# Patient Record
Sex: MALE | Race: WHITE | NOT HISPANIC OR LATINO | Employment: OTHER | URBAN - METROPOLITAN AREA
[De-identification: names, ages, dates, MRNs, and addresses within clinical notes are randomized per-mention and may not be internally consistent; named-entity substitution may affect disease eponyms.]

---

## 2019-04-08 ENCOUNTER — HISTORICAL (OUTPATIENT)
Dept: WOUND CARE | Facility: HOSPITAL | Age: 74
End: 2019-04-08

## 2022-05-05 NOTE — HISTORICAL [facility]ER
This is a historical note converted from Jasbir. Formatting and pictures may have been removed.  Please reference Jasbir for original formatting and attached multimedia. Chief Complaint  right BKA/stump wound  History of Present Illness  72 yo male presents for a right BKA/stump wound. Patient is currently being treated with hydrofera to wound daily with a ABD pad, kerix and tape.? He has been treated in the past at [facility] in? 2016.? 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 Today he presents with a gangrenous B/K amputation stump.  Review of Systems  Constitutional:?no fever, fatigue, weakness  ENMT:?no?nasal congestion/drainage  Respiratory:?no couch, no shortness of breath  Cardiovascular:?no chest pain, no palpitations, no edema  Gastrointestinal:?no nausea, vomiting  Hema/Lymph:?no abnormal bruising or bleeding  Musculoskeletal:?no muscle or joint pain, no joint swelling  Integumentary:?right BKA/ stump  ?  ?  Physical Exam  Vitals & Measurements  HR:?83(Peripheral)? RR:?20? BP:?133/66?  HT:?160?cm? WT:?78?kg?  Incision/Wounds  ?1. Leg Right Other: BKA/stump Surgical incision?- last charted: 04/08/2019 15:00  ?? ??Assessment Done By?- Wound Care Team  ?? ??Abnormality Color?- Yellow  ?? ??Pressure Point?- Bony prominence  ?? ??Dressing Type?- ABD dressing pad  ?? ??Dressing Assessment?- Drainage present  ?? ??Dressing Activity?- Changed  ?? ??Length?- 11.5 cm  ?? ??Width?- 22.0 cm  ?? ??Depth?- 0 cm  ?? ??Percent Necrotic Tissue Slough?- 100 %  ?? ??Exudate Amount?- Heavy  ?? ??Exudate Type?- Serosanguineous  ?? ??Exudate Odor?- None  ?? ??Edge?- Attached to wound bed  ?  ?  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 Montgomery for further  evaluation of wound and is scheduled to see him tomorrow morning at 8am. A dry dressings was applied to wound. Patient is to RTC after evaluation by Dr. Montgomery.   Problem List/Past Medical History  Ongoing  HTN (hypertension)  PAD (peripheral artery disease)  Tobacco user  Historical  No qualifying data  Procedure/Surgical History  Angiography, extremity, unilateral, radiological supervision and interpretation (09/29/2016)  Dilation of Left Femoral Artery with Intraluminal Device, Percutaneous Approach (09/29/2016)  Dilation of Left Popliteal Artery with Intraluminal Device, Percutaneous Approach (09/29/2016)  Extirpation of Matter from Left Femoral Artery, Percutaneous Approach (09/29/2016)  Extirpation of Matter from Left Popliteal Artery, Percutaneous Approach (09/29/2016)  Fluoroscopy of Left Lower Extremity Arteries using Low Osmolar Contrast (09/29/2016)  Revascularization, endovascular, open or percutaneous, femoral, popliteal artery(s), unilateral; with transluminal stent placement(s) and atherectomy, includes angioplasty within the same vessel, when performed (09/29/2016)  back surgery (2016)  peripheral stents   Medications  aspirin 81 mg oral tablet, 81 mg= 1 tab(s), Oral, Daily  benazepril 10 mg oral tablet, 10 mg= 1 tab(s), Oral, Daily  diltiazem 120 mg/24 hours oral CAPsule extended release, 120 mg= 1 cap(s), Oral, Daily  Diltiazem Hydrochloride ER, 420 mg, Daily,? ?Not taking: Currently taking 120mg qd  Lyrica 50 mg oral capsule, 50 mg= 1 cap(s), Oral, TID  Norco 7.5 mg-325 mg oral tablet, 1 tab(s), Oral, q4hr, PRN  Plavix 75 mg oral tablet, 75 mg= 1 tab(s), Oral, Daily  tamsulosin 0.4 mg oral capsule, 0.4 mg= 1 cap(s), Oral, Daily  terazosin 10 mg oral capsule, 10 mg= 1 cap(s), Oral, Once a day (at bedtime),? ?Not taking: Currently taking 10mg qd  terazosin 5 mg oral capsule, 5 mg= 1 cap(s), Oral, qPM  Zontivity 2.08 mg oral tablet, 2.08 mg= 1 tab(s), Oral, Daily, 5 refills,? ?Not taking  ZyrTEC,  Oral, Daily  Allergies  No Known Medication Allergies  Social History  Alcohol  Current, Liquor, 3-5 times per week, 09/27/2016  Employment/School  Part time, 09/27/2016  Home/Environment  Lives with Spouse., 09/27/2016  Nutrition/Health  Regular, 09/27/2016  Tobacco  Former smoker, quit more than 30 days ago, N/A, 04/08/2019  Current every day smoker, Cigarettes, 09/27/2016  Family History  Family history is negative  Health Maintenance  Health Maintenance  ???Pending?(in the next year)  ??? ??OverDue  ??? ? ? ?Advance Directive due??09/27/17??and every 1??year(s)  ??? ? ? ?Fall Risk Assessment due??09/30/17??and every 1??year(s)  ??? ? ? ?Aspirin Therapy for CVD Prevention due??09/30/17??and every 1??year(s)  ??? ??Due?  ??? ? ? ?ADL Screening due??04/08/19??and every 1??year(s)  ??? ? ? ?Abdominal Aortic Aneurysm Screening due??04/08/19??and every 100??year(s)  ??? ? ? ?Alcohol Misuse Screening due??04/08/19??and every 1??year(s)  ??? ? ? ?Cognitive Screening due??04/08/19??and every 1??year(s)  ??? ? ? ?Functional Assessment due??04/08/19??and every 1??year(s)  ??? ? ? ?Geriatric Depression Screening due??04/08/19??and every 1??year(s)  ??? ? ? ?Lung Cancer Screening due??04/08/19??and every 1??year(s)  ??? ? ? ?Obesity Screening due??04/08/19??and every 1??year(s)  ??? ? ? ?Pneumococcal Vaccine due??04/08/19??Variable frequency  ??? ? ? ?Smoking Cessation due??04/08/19??Variable frequency  ??? ? ? ?Tetanus Vaccine due??04/08/19??and every 10??year(s)  ??? ? ? ?Zoster Vaccine due??04/08/19??and every 100??year(s)  ???Satisfied?(in the past 1 year)  ??? ??Satisfied?  ??? ? ? ?Blood Pressure Screening on??04/08/19.??Satisfied by Gretchen Randle  ??? ? ? ?Diabetes Screening on??12/12/18.  ?  ?

## 2023-11-04 ENCOUNTER — HOSPITAL ENCOUNTER (EMERGENCY)
Facility: HOSPITAL | Age: 78
Discharge: HOME | End: 2023-11-04
Attending: EMERGENCY MEDICINE
Payer: MEDICARE

## 2023-11-04 VITALS
WEIGHT: 190 LBS | BODY MASS INDEX: 27.2 KG/M2 | HEIGHT: 70 IN | SYSTOLIC BLOOD PRESSURE: 135 MMHG | RESPIRATION RATE: 18 BRPM | DIASTOLIC BLOOD PRESSURE: 71 MMHG | HEART RATE: 78 BPM | OXYGEN SATURATION: 97 % | TEMPERATURE: 98 F

## 2023-11-04 DIAGNOSIS — S41.111A LACERATION OF RIGHT UPPER EXTREMITY, INITIAL ENCOUNTER: ICD-10-CM

## 2023-11-04 DIAGNOSIS — S02.401A CLOSED FRACTURE OF MAXILLARY SINUS, INITIAL ENCOUNTER: ICD-10-CM

## 2023-11-04 DIAGNOSIS — Z79.01 CHRONIC ANTICOAGULATION: ICD-10-CM
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
## 2023-11-04 LAB
ABORH RETYPE: NORMAL
ALBUMIN SERPL-MCNC: 3.7 G/DL (ref 3.4–4.8)
ALBUMIN/GLOB SERPL: 1.3 RATIO (ref 1.1–2)
ALP SERPL-CCNC: 70 UNIT/L (ref 40–150)
ALT SERPL-CCNC: 15 UNIT/L (ref 0–55)
APTT PPP: 50.5 SECONDS (ref 23.2–33.7)
AST SERPL-CCNC: 15 UNIT/L (ref 5–34)
BASOPHILS # BLD AUTO: 0.06 X10(3)/MCL
BASOPHILS NFR BLD AUTO: 0.7 %
BILIRUB SERPL-MCNC: 0.6 MG/DL
BUN SERPL-MCNC: 13.7 MG/DL (ref 8.4–25.7)
CALCIUM SERPL-MCNC: 9.3 MG/DL (ref 8.8–10)
CHLORIDE SERPL-SCNC: 113 MMOL/L (ref 98–107)
CO2 SERPL-SCNC: 21 MMOL/L (ref 23–31)
CREAT SERPL-MCNC: 0.82 MG/DL (ref 0.73–1.18)
EOSINOPHIL # BLD AUTO: 0.27 X10(3)/MCL (ref 0–0.9)
EOSINOPHIL NFR BLD AUTO: 3.1 %
ERYTHROCYTE [DISTWIDTH] IN BLOOD BY AUTOMATED COUNT: 13.4 % (ref 11.5–17)
GFR SERPLBLD CREATININE-BSD FMLA CKD-EPI: >60 MLS/MIN/1.73/M2
GLOBULIN SER-MCNC: 2.8 GM/DL (ref 2.4–3.5)
GLUCOSE SERPL-MCNC: 116 MG/DL (ref 82–115)
GROUP & RH: NORMAL
HCT VFR BLD AUTO: 41.1 % (ref 42–52)
HGB BLD-MCNC: 14 G/DL (ref 14–18)
IMM GRANULOCYTES # BLD AUTO: 0.03 X10(3)/MCL (ref 0–0.04)
IMM GRANULOCYTES NFR BLD AUTO: 0.3 %
INDIRECT COOMBS GEL: NORMAL
INR PPP: 2.3
LYMPHOCYTES # BLD AUTO: 1.5 X10(3)/MCL (ref 0.6–4.6)
LYMPHOCYTES NFR BLD AUTO: 17.2 %
MCH RBC QN AUTO: 32.5 PG (ref 27–31)
MCHC RBC AUTO-ENTMCNC: 34.1 G/DL (ref 33–36)
MCV RBC AUTO: 95.4 FL (ref 80–94)
MONOCYTES # BLD AUTO: 0.57 X10(3)/MCL (ref 0.1–1.3)
MONOCYTES NFR BLD AUTO: 6.5 %
NEUTROPHILS # BLD AUTO: 6.29 X10(3)/MCL (ref 2.1–9.2)
NEUTROPHILS NFR BLD AUTO: 72.2 %
NRBC BLD AUTO-RTO: 0 %
PLATELET # BLD AUTO: 162 X10(3)/MCL (ref 130–400)
PMV BLD AUTO: 10.4 FL (ref 7.4–10.4)
POTASSIUM SERPL-SCNC: 3.8 MMOL/L (ref 3.5–5.1)
PROT SERPL-MCNC: 6.5 GM/DL (ref 5.8–7.6)
PROTHROMBIN TIME: 24.9 SECONDS (ref 12.5–14.5)
RBC # BLD AUTO: 4.31 X10(6)/MCL (ref 4.7–6.1)
SODIUM SERPL-SCNC: 146 MMOL/L (ref 136–145)
SPECIMEN OUTDATE: NORMAL
WBC # SPEC AUTO: 8.72 X10(3)/MCL (ref 4.5–11.5)

## 2023-11-04 PROCEDURE — 63600175 PHARM REV CODE 636 W HCPCS: Performed by: EMERGENCY MEDICINE

## 2023-11-04 PROCEDURE — 80053 COMPREHEN METABOLIC PANEL: CPT | Performed by: EMERGENCY MEDICINE

## 2023-11-04 PROCEDURE — 25000003 PHARM REV CODE 250: Performed by: EMERGENCY MEDICINE

## 2023-11-04 PROCEDURE — 85025 COMPLETE CBC W/AUTO DIFF WBC: CPT | Performed by: EMERGENCY MEDICINE

## 2023-11-04 PROCEDURE — 90471 IMMUNIZATION ADMIN: CPT | Performed by: EMERGENCY MEDICINE

## 2023-11-04 PROCEDURE — 85610 PROTHROMBIN TIME: CPT | Performed by: EMERGENCY MEDICINE

## 2023-11-04 PROCEDURE — 85730 THROMBOPLASTIN TIME PARTIAL: CPT | Performed by: EMERGENCY MEDICINE

## 2023-11-04 PROCEDURE — 12004 RPR S/N/AX/GEN/TRK7.6-12.5CM: CPT

## 2023-11-04 PROCEDURE — 86901 BLOOD TYPING SEROLOGIC RH(D): CPT | Performed by: EMERGENCY MEDICINE

## 2023-11-04 PROCEDURE — 99291 CRITICAL CARE FIRST HOUR: CPT

## 2023-11-04 PROCEDURE — G0390 TRAUMA RESPONS W/HOSP CRITI: HCPCS

## 2023-11-04 PROCEDURE — 90715 TDAP VACCINE 7 YRS/> IM: CPT | Performed by: EMERGENCY MEDICINE

## 2023-11-04 RX ORDER — CEFADROXIL 500 MG/1
500 CAPSULE ORAL EVERY 12 HOURS
Status: DISCONTINUED | OUTPATIENT
Start: 2023-11-04 | End: 2023-11-04

## 2023-11-04 RX ORDER — CEFADROXIL 500 MG/1
500 CAPSULE ORAL EVERY 12 HOURS
Qty: 14 CAPSULE | Refills: 0 | Status: SHIPPED | OUTPATIENT
Start: 2023-11-04 | End: 2023-11-11

## 2023-11-04 RX ORDER — LIDOCAINE HYDROCHLORIDE 10 MG/ML
INJECTION INFILTRATION; PERINEURAL
Status: DISCONTINUED
Start: 2023-11-04 | End: 2023-11-04 | Stop reason: HOSPADM

## 2023-11-04 RX ORDER — CEFADROXIL 500 MG/1
500 CAPSULE ORAL EVERY 12 HOURS
Status: COMPLETED | OUTPATIENT
Start: 2023-11-04 | End: 2023-11-04

## 2023-11-04 RX ADMIN — CEFADROXIL 500 MG: 500 CAPSULE ORAL at 07:11

## 2023-11-04 RX ADMIN — TETANUS TOXOID, REDUCED DIPHTHERIA TOXOID AND ACELLULAR PERTUSSIS VACCINE, ADSORBED 0.5 ML: 5; 2.5; 8; 8; 2.5 SUSPENSION INTRAMUSCULAR at 04:11

## 2023-11-04 NOTE — ED PROVIDER NOTES
"Encounter Date: 11/4/2023    SCRIBE #1 NOTE: I, Darline Pilar, am scribing for, and in the presence of,  Delores Garrison MD. I have scribed the following portions of the note - Other sections scribed: HPI, ROS, PE.       History   No chief complaint on file.    78 year old white male was in a store with his girlfriend when he suddenly had the urge to have BM. He rushed to the bathroom and began to have loose stools which soiled his pants.  He was taking off his pants and his prosthetic leg because both were soiled and he was trying to clean it up.  He went to reattach his prosthetic leg but says he did it too fast and did not get a good seal and he ended up falling while in the bathroom stall hitting his face and right elbow on the toilet.  His grandson happened to be in the bathroom and was able to get help.  Patient denies any LOC. he complained of neck pain to EMS but refused a cervical collar.  He sustained laceration to his right arm.  He says he does not feel like anything is broken.  No chest pain or shortness of breath.  No hip pain.  No trouble moving arms or legs .  He is on chronic anticoagulation  : Xarelto and Plavix.  He says per date Dr. Jacob Parsons but is not exactly sure why.  He says for my "blood flow".  He was made a level 2 trauma      The history is provided by the patient and the EMS personnel.     Review of patient's allergies indicates:  No Known Allergies  No past medical history on file.  No past surgical history on file.  No family history on file.     Review of Systems   Constitutional: Negative.    HENT: Negative.     Eyes: Negative.    Respiratory: Negative.     Cardiovascular: Negative.    Gastrointestinal: Negative.  Negative for vomiting.   Endocrine: Negative.    Genitourinary: Negative.    Musculoskeletal: Negative.  Negative for neck pain.        No rib pain   Skin:  Positive for wound (laceration to right forearm).   Allergic/Immunologic: Negative.    Neurological: " Negative.    Hematological: Negative.    Psychiatric/Behavioral: Negative.     All other systems reviewed and are negative.      Physical Exam     Initial Vitals   BP Pulse Resp Temp SpO2   11/04/23 1559 11/04/23 1559 11/04/23 1559 11/04/23 1601 11/04/23 1559   137/76 72 (!) 28 97.9 °F (36.6 °C) (!) 92 %      MAP       --                Physical Exam    Nursing note and vitals reviewed.  Constitutional: He appears well-developed and well-nourished. He does not appear ill. No distress.   Elderly white male in no distress   HENT:   Head: Normocephalic and atraumatic.   Right Ear: Tympanic membrane normal.   Left Ear: Tympanic membrane normal.   Mouth/Throat: No oropharyngeal exudate or posterior oropharyngeal erythema.   Right cheek is contused with swelling.  Hearing aids in both ears.  Old blood in right nostril.   Eyes: Conjunctivae and EOM are normal. Pupils are equal, round, and reactive to light.   Neck: Neck supple. No tracheal tenderness present. No tracheal deviation present. Carotid bruit is not present.   It is nontender to my examination   Cardiovascular:  Normal rate and regular rhythm.           Pulmonary/Chest: Breath sounds normal. No stridor. No respiratory distress.   No rib tenderness or crepitus   Abdominal: Abdomen is soft. Bowel sounds are normal. There is no abdominal tenderness.   Old bruising to left lower abdomen   Musculoskeletal:         General: Normal range of motion.        Arms:       Cervical back: Neck supple.      Lumbar back: No tenderness.      Comments: Chronic right BKA     Neurological: He is alert and oriented to person, place, and time. He has normal strength. No cranial nerve deficit or sensory deficit. GCS score is 15. GCS eye subscore is 4. GCS verbal subscore is 5. GCS motor subscore is 6.   No motor or sensory deficits found   Skin: Skin is warm, dry and intact. Capillary refill takes less than 2 seconds. No cyanosis.   Both his upper arms demonstrate old bruising    Psychiatric: He has a normal mood and affect.         ED Course   Lac Repair    Date/Time: 11/4/2023 6:51 PM    Performed by: Delores Garrison MD  Authorized by: Delores Garrison MD    Consent:     Consent obtained:  Verbal    Consent given by:  Patient    Risks, benefits, and alternatives were discussed: yes      Risks discussed:  Infection, pain, retained foreign body, poor cosmetic result, tendon damage, poor wound healing, nerve damage and need for additional repair    Alternatives discussed:  No treatment  Anesthesia:     Anesthesia method:  Local infiltration    Local anesthetic:  Lidocaine 1% WITH epi  Laceration details:     Location:  Shoulder/arm    Shoulder/arm location:  R lower arm    Length (cm):  12    Depth (mm):  0.3  Pre-procedure details:     Preparation:  Patient was prepped and draped in usual sterile fashion and imaging obtained to evaluate for foreign bodies  Exploration:     Imaging outcome: foreign body not noted      Contaminated: no    Treatment:     Amount of cleaning:  Standard    Irrigation solution:  Sterile saline    Irrigation volume:  250    Irrigation method:  Pressure wash    Visualized foreign bodies/material removed: no      Debridement:  None    Undermining:  None    Scar revision: no    Skin repair:     Repair method:  Sutures    Suture size:  3-0    Suture material:  Nylon    Suture technique:  Simple interrupted    Number of sutures:  15  Approximation:     Approximation:  Close  Repair type:     Repair type:  Simple  Post-procedure details:     Dressing:  Bulky dressing    Procedure completion:  Tolerated  Comments:      Patient tolerated very well.  No active bleeding  Critical Care    Date/Time: 11/4/2023 8:22 PM    Performed by: Delores Garrison MD  Authorized by: Delores Garrison MD  Direct patient critical care time: 15 minutes  Additional history critical care time: 8 minutes  Ordering / reviewing critical care time: 12 minutes  Documentation  critical care time: 15 minutes  Consult with family critical care time: 10 minutes  Total critical care time (exclusive of procedural time) : 60 minutes        Labs Reviewed   COMPREHENSIVE METABOLIC PANEL - Abnormal; Notable for the following components:       Result Value    Sodium Level 146 (*)     Chloride 113 (*)     Carbon Dioxide 21 (*)     Glucose Level 116 (*)     All other components within normal limits   PROTIME-INR - Abnormal; Notable for the following components:    PT 24.9 (*)     INR 2.3 (*)     All other components within normal limits   APTT - Abnormal; Notable for the following components:    PTT 50.5 (*)     All other components within normal limits   CBC WITH DIFFERENTIAL - Abnormal; Notable for the following components:    RBC 4.31 (*)     Hct 41.1 (*)     MCV 95.4 (*)     MCH 32.5 (*)     All other components within normal limits   CBC W/ AUTO DIFFERENTIAL    Narrative:     The following orders were created for panel order CBC auto differential.  Procedure                               Abnormality         Status                     ---------                               -----------         ------                     CBC with Differential[[id number]]       Abnormal            Final result                 Please view results for these tests on the individual orders.   URINALYSIS, REFLEX TO URINE CULTURE   TYPE & SCREEN   ABORH RETYPE          Imaging Results              CT Cervical Spine Without Contrast (Final result)  Result time 11/04/23 16:36:38      Final result by Maritza Laguerre MD (11/04/23 16:36:38)                   Impression:      1. No appreciable acute fracture  2. Moderate to severe degenerative change at the cervical spine with degenerative listhesis      Electronically signed by: Maritza Laguerre  Date:    11/04/2023  Time:    16:36               Narrative:    EXAMINATION:  CT CERVICAL SPINE WITHOUT CONTRAST    CLINICAL HISTORY:  Trauma;    TECHNIQUE:  Low dose helical  acquired images with axial, sagittal and coronal reformations though the cervical spine.  Contrast was not administered.    All CT scans at this location are performed using dose optimization techniques as appropriate to a performed exam including the following automated exposure control, adjustment of the mA and/or kV according to patient size and/or use of iterative reconstruction technique    DLP: 2088 mGycm    COMPARISON:  No relevant prior available for comparison.    FINDINGS:  Mild motion degraded exam.    BONES: No fracture.  Grade 1 anterolisthesis of C3 on C4 and C4 on C5.  Grade 1 retrolisthesis of C5 on C6. The dens is intact, the lateral masses of C1 are normally aligned, and the atlantodental interval is normal.    DISCS AND FACETS: Multilevel moderate to severe disc space narrowing most pronounced at C5-C6 and C6-C7.  Multilevel bilateral facet arthropathy.    SPINAL CANAL AND NEURAL FORAMINA: Facet arthropathy contributes to mild multilevel neural foraminal stenosis.  No significant osseous central canal stenosis appreciable.    SOFT TISSUES: Regional soft tissues are unremarkable.    LUNG APICES: Clear                                       CT Head Without Contrast (Final result)  Result time 11/04/23 16:34:21      Final result by Joseph Escalera MD (11/04/23 16:34:21)                   Impression:      No acute intracranial abnormality identified.  Findings of microvascular ischemic disease.      Electronically signed by: Joseph Escalera  Date:    11/04/2023  Time:    16:34               Narrative:    EXAMINATION:  CT HEAD WITHOUT CONTRAST    CLINICAL HISTORY:  Trauma;    TECHNIQUE:  Low dose axial images were obtained through the head.  Coronal and sagittal reformations were also performed. Contrast was not administered.    Automatic exposure control was utilized to reduce the patient's radiation dose.    DLP= 2088    COMPARISON:  None.    FINDINGS:  No acute intracranial hemorrhage, edema or  mass. No acute parenchymal abnormality.    Mild cerebral atrophy with concordant ventricular enlargement.    Scattered hypodensities throughout the deep periventricular white matter.    The osseous structures are normal.    The mastoid air cells are clear.    The auditory canals are patent bilaterally.    The globes and orbital contents are normal bilaterally.    The visualized maxillary, ethmoid and sphenoid sinuses are clear.                                       CT Maxillofacial Without Contrast (Final result)  Result time 11/04/23 16:36:22      Final result by Joseph Escalera MD (11/04/23 16:36:22)                   Impression:      Minimally displaced fracture of the anterior right maxillary sinus wall with trace subcutaneous gas and edema.      Electronically signed by: Joseph Escalera  Date:    11/04/2023  Time:    16:36               Narrative:    CLINICAL HISTORY:  Trauma.    TECHNIQUE:  Maxillofacial CT was performed without  contrast. There are sagittal and coronal reconstructed images available for review.    Automatic exposure control was utilized to reduce the patient's radiation dose.    DLP = 2088    COMPARISON:  No prior imaging available for comparison.    FINDINGS:  Minimally displaced fracture of the anterior right maxillary sinus wall with trace subcutaneous gas and edema.  The bilateral zygomatic arches are intact.  The mandible appears intact.  The nasal bones appear intact.  The bilateral orbits are intact.  There is no retro bulbar hematoma.                                       X-Ray Elbow Complete 3 views Right (Final result)  Result time 11/04/23 16:39:46      Final result by Joseph Escalera MD (11/04/23 16:39:46)                   Impression:      As above.      Electronically signed by: Joseph Escalera  Date:    11/04/2023  Time:    16:39               Narrative:    EXAMINATION:  XR ELBOW COMPLETE 3 VIEW RIGHT    CLINICAL HISTORY:  fall;.    TECHNIQUE:  AP, lateral, and oblique  views of the right elbow were performed.    COMPARISON:  None    FINDINGS:  No displaced fracture.  Degenerative changes with electro non osteophytes.                                       X-Ray Pelvis Routine AP (Final result)  Result time 11/04/23 16:38:37      Final result by Joseph Escalera MD (11/04/23 16:38:37)                   Impression:      As above.  If continued pain recommend repeat imaging within 2 weeks.      Electronically signed by: Joseph Escalera  Date:    11/04/2023  Time:    16:38               Narrative:    EXAMINATION:  XR PELVIS ROUTINE AP    CLINICAL HISTORY:  r/o bleeding or hemorrhage;    TECHNIQUE:  Single view of the pelvis.    COMPARISON:  No prior imaging available for comparison    FINDINGS:  Vascular atherosclerotic disease is noted with stents.  No displaced fracture appreciated.  Evaluation is limited secondary to large overlying pannus.                                       X-Ray Chest 1 View (Final result)  Result time 11/04/23 16:38:04      Final result by Joseph Escalera MD (11/04/23 16:38:04)                   Impression:      No acute cardiopulmonary process.      Electronically signed by: Joseph Escalera  Date:    11/04/2023  Time:    16:38               Narrative:    EXAMINATION:  XR CHEST 1 VIEW    CLINICAL HISTORY:  r/o bleeding or hemorrhage;    TECHNIQUE:  Single view of the chest    COMPARISON:  No prior imaging available for comparison.    FINDINGS:  No focal opacification, pleural effusion, or pneumothorax.    Cardiomediastinal silhouette is prominent.    No acute osseous abnormality.                                       Medications   tetanus-diphther toxoids vaccine (PF) (TENIVAC) injection (0.5 mLs Intramuscular Not Given 11/4/23 1715)   LIDOcaine HCL 10 mg/ml (1%) 10 mg/mL (1 %) injection (has no administration in time range)   Tdap (BOOSTRIX) vaccine injection 0.5 mL (0.5 mLs Intramuscular Given 11/4/23 1607)   cefadroxil capsule 500 mg (500 mg Oral Given  11/4/23 1912)     Medical Decision Making  Amount and/or Complexity of Data Reviewed  Labs: ordered.  Radiology: ordered.    Risk  Prescription drug management.            Scribe Attestation:   Scribe #1: I performed the above scribed service and the documentation accurately describes the services I performed. I attest to the accuracy of the note.    Attending Attestation:           Physician Attestation for Scribe:  Physician Attestation Statement for Scribe #1: I, Delores Garrison MD, reviewed documentation, as scribed by Darline Quezada in my presence, and it is both accurate and complete.                        Medical Decision Making:   Differential Diagnosis:   Head injury, intracranial hemorrhage or cerebral contusion, skull fracture, neck injury, facial fractures, musculoskeletal trauma  Clinical Tests:   Lab Tests: Ordered and Reviewed  Radiological Study: Ordered and Reviewed  Medical Tests: Ordered and Reviewed  ED Management:  Patient was a level 2 trauma.  He was evaluated as noted above . He required suturing of the right forearm laceration.  I was able to speak at length with the patient and his girlfriend.  Patient lives in [place].  I reviewed all of the results including the maxillary sinus fracture.  I will give him an antibiotic oral dose tonight since he will not be going to the pharmacy.  Patient states that they both have pain medications at home and they do not it a prescription for that.  I advised the girlfriend to be on the lookout for any altered mentation or things such as headaches, vomiting. And have a low threshold to bring him back to ED. Pt says he will see in PCP in [place] in 2 days. I instructed pt he needs to see ENT as well although I don't think he will need any type of surgical treatment. He will need sutures removed in about 10 days as well; ice to face and arm and any other sore areas. They should also have a discussion with PCP and/or Dr RAMESH Montgomery about his  anticoagulants and if needs both.      Clinical Impression:   Final diagnoses:  [W19.XXXA] Fall, initial encounter (Primary)  [S41.111A] Laceration of right upper extremity, initial encounter  [S02.401A] Closed fracture of maxillary sinus, initial encounter  [Z79.01] Chronic anticoagulation        ED Disposition Condition    Discharge Stable          ED Prescriptions       Medication Sig Dispense Start Date End Date Auth. Provider    cefadroxil (DURICEF) 500 MG Cap Take 1 capsule (500 mg total) by mouth every 12 (twelve) hours. for 7 days 14 capsule 11/4/2023 11/11/2023 Delores Garrison MD          Follow-up Information       Follow up With Specialties Details Why Contact Info    PCP  In 1 day You will need to see your doctor on Monday for recheck.  You need to see ENT.  There is a Dr. Petros Gary in [facility] you  can see for your face sterile fracture PCP 1-2 days; return to ed for any worsening             Delores Garrison MD  11/04/23 2029

## 2023-11-04 NOTE — CONSULTS
"   Trauma Surgery   Activation Note    Patient Name: Ilia Nascimento  MRN: [id number]   YOB: 1948  Date: 11/04/2023    LEVEL 2 TRAUMA     Subjective:   History of present illness: Patient is an approximately 75 year old M w/ PMH HTN presents as level 2 trauma activation after fall in bathroom. Pt was having diarrhea, and fell in bathroom due to his prosthetic R leg being unlocked. Pt hit his right cheek and elbow. Pt has v shaped laceration to elbow. Denies LOC, neck pain, back pain, abdominal pain. Denies chest pain, SOB, nausea/vomiting. Of note, pt on xarelto and plavix for "bad circulation".     Primary Survey:  A Protected, speaking   B B/l breath sounds, no increased work of breathing   C 2+ pulses radial b/l, DP LLE   D GCS 15(E 4, V 5, M 6)    E exposed, log-rolled and examined (see below)   F See below     VITAL SIGNS: 24 HR MIN & MAX LAST   Temp  Min: 97.9 °F (36.6 °C)  Max: 98.4 °F (36.9 °C)  98.4 °F (36.9 °C)   BP  Min: 123/71  Max: 137/76  123/71    Pulse  Min: 67  Max: 72  70    Resp  Min: 18  Max: 28  18    SpO2  Min: 92 %  Max: 97 %  95 %      HT: 5' 10" (177.8 cm)  WT: 86.2 kg (190 lb)  BMI: 27.3     FAST: deferred    Medications/transfusions received en-route: see EMS report  Medications/transfusions received in trauma bay: tdap    Scheduled Meds:   tetanus and diphther. tox (PF)  0.5 mL Intramuscular Once     Continuous Infusions:  PRN Meds:    ROS: 12 point ROS negative except as stated in HPI    Allergies: NKDA  PMH:  HTN  PSH: Noncontributory  Social history: Noncontributory  Objective:   Secondary Survey:   General: Well developed, well nourished, no acute distress, AAOx3  Neuro: CNII-XII grossly intact  HEENT:  Normocephalic, atraumatic, PERRL, cervical collar in place, R cheek contusion, old blood R nostril, hearing aids  CV:  RRR  Pulse: 2+ RP b/l, 2+ DP b/l   Resp/chest:  Non-labored breathing, satting on room air  GI:  Abdomen soft, non-tender, non-distended, old bruise " LLQ  :  Normal external male genitalia,  no blood at urethral meatus.   Rectal: Normal tone, no gross blood.  Extremities: Moves all 4 spontaneously and purposefully, no obvious gross deformities, 5-7 cm v shaped laceration posterior R forearm, just distal to elbow, R BKA  Back/Spine: No bony TTP, no palpable step offs or deformities.  Cervical back: Normal. No tenderness.  Thoracic back: Normal. No tenderness.  Lumbar back: Normal. No tenderness.  Skin/wounds:  Warm, well perfused  Psych: Normal mood and affect.    Labs:    Imaging:  CT Cervical Spine Without Contrast   Final Result      1. No appreciable acute fracture   2. Moderate to severe degenerative change at the cervical spine with degenerative listhesis         Electronically signed by: Maritza Laguerre   Date:    11/04/2023   Time:    16:36      CT Head Without Contrast   Final Result      No acute intracranial abnormality identified.  Findings of microvascular ischemic disease.         Electronically signed by: Joseph Escalera   Date:    11/04/2023   Time:    16:34      CT Maxillofacial Without Contrast   Final Result      Minimally displaced fracture of the anterior right maxillary sinus wall with trace subcutaneous gas and edema.         Electronically signed by: Joseph Escalera   Date:    11/04/2023   Time:    16:36      X-Ray Elbow Complete 3 views Right   Final Result      As above.         Electronically signed by: Joseph Escalera   Date:    11/04/2023   Time:    16:39      X-Ray Pelvis Routine AP   Final Result      As above.  If continued pain recommend repeat imaging within 2 weeks.         Electronically signed by: Joseph Escalera   Date:    11/04/2023   Time:    16:38      X-Ray Chest 1 View   Final Result      No acute cardiopulmonary process.         Electronically signed by: Joseph Escalera   Date:    11/04/2023   Time:    16:38        Assessment & Plan:   Patient is an approximately 75 year old M w/ PMH HTN presents as level 2 trauma  activation after fall in bathroom. Pt was having diarrhea, and fell in bathroom due to his prosthetic R leg being unlocked.     - reviewed imaging, CT C-spine, CT head negative. CT maxillofacial demonstrated minimally displaced fracture of anterior R maxillary sinus wall w/ trace subcutaneous gas and edema. CXR, plain films elbow and pelvis negative  - dispo per ED    Margaret Coleman MD  [employer] General Surgery PGY1

## 2023-11-04 NOTE — DISCHARGE INSTRUCTIONS
Ice packs to face.  Avoid nose blowing.  Would suggest to sleep upright.  Sutures in the right arm need to be removed in about 10 days.  See your PCP for that.  Look for any signs of infection.  Watch for any altered mentation, vomiting, headaches etc..

## 2024-09-19 ENCOUNTER — HOSPITAL ENCOUNTER (INPATIENT)
Facility: HOSPITAL | Age: 79
LOS: 5 days | Discharge: HOME HEALTH | DRG: 100 | End: 2024-09-24
Attending: STUDENT IN AN ORGANIZED HEALTH CARE EDUCATION/TRAINING PROGRAM | Admitting: INTERNAL MEDICINE
Payer: MEDICARE

## 2024-09-19 DIAGNOSIS — I63.9 STROKE: ICD-10-CM

## 2024-09-19 DIAGNOSIS — R56.9 SEIZURE: Primary | ICD-10-CM

## 2024-09-19 DIAGNOSIS — Z97.8 ENDOTRACHEALLY INTUBATED: ICD-10-CM

## 2024-09-19 DIAGNOSIS — R41.82 ALTERED MENTAL STATUS, UNSPECIFIED ALTERED MENTAL STATUS TYPE: ICD-10-CM

## 2024-09-19 DIAGNOSIS — J44.9 CHRONIC OBSTRUCTIVE PULMONARY DISEASE, UNSPECIFIED COPD TYPE: ICD-10-CM

## 2024-09-19 PROBLEM — N13.8 BENIGN PROSTATIC HYPERPLASIA WITH URINARY OBSTRUCTION: Status: ACTIVE | Noted: 2024-09-19

## 2024-09-19 PROBLEM — I73.9 PAD (PERIPHERAL ARTERY DISEASE): Status: ACTIVE | Noted: 2018-12-17

## 2024-09-19 PROBLEM — M51.26 HERNIATED LUMBAR INTERVERTEBRAL DISC: Status: ACTIVE | Noted: 2024-09-19

## 2024-09-19 PROBLEM — M54.50 LOW BACK PAIN: Status: ACTIVE | Noted: 2024-09-19

## 2024-09-19 PROBLEM — I74.9 ARTERIAL THROMBOSIS: Status: ACTIVE | Noted: 2018-12-17

## 2024-09-19 PROBLEM — I71.40 ABDOMINAL AORTIC ANEURYSM (AAA): Status: ACTIVE | Noted: 2024-09-19

## 2024-09-19 PROBLEM — N40.1 BENIGN PROSTATIC HYPERPLASIA WITH URINARY OBSTRUCTION: Status: ACTIVE | Noted: 2024-09-19

## 2024-09-19 PROBLEM — I10 ESSENTIAL HYPERTENSION: Status: ACTIVE | Noted: 2018-12-17

## 2024-09-19 PROBLEM — G47.9 DYSSOMNIA: Status: ACTIVE | Noted: 2024-09-19

## 2024-09-19 PROBLEM — M47.10 SPONDYLOSIS WITH MYELOPATHY: Status: ACTIVE | Noted: 2024-09-19

## 2024-09-19 PROBLEM — E78.5 DYSLIPIDEMIA: Status: ACTIVE | Noted: 2018-12-17

## 2024-09-19 LAB
ALBUMIN SERPL-MCNC: 2.4 G/DL (ref 3.4–4.8)
ALBUMIN/GLOB SERPL: 1 RATIO (ref 1.1–2)
ALP SERPL-CCNC: 59 UNIT/L (ref 40–150)
ALT SERPL-CCNC: 11 UNIT/L (ref 0–55)
AMPHET UR QL SCN: NEGATIVE
ANION GAP SERPL CALC-SCNC: 18 MMOL/L (ref 8–16)
ANION GAP SERPL CALC-SCNC: 9 MEQ/L
APICAL FOUR CHAMBER EJECTION FRACTION: 45 %
APICAL TWO CHAMBER EJECTION FRACTION: 58 %
AST SERPL-CCNC: 13 UNIT/L (ref 5–34)
AV INDEX (PROSTH): 0.44
AV MEAN GRADIENT: 7 MMHG
AV PEAK GRADIENT: 16 MMHG
AV VELOCITY RATIO: 0.39
BACTERIA #/AREA URNS AUTO: ABNORMAL /HPF
BARBITURATE SCN PRESENT UR: NEGATIVE
BASE EXCESS BLD CALC-SCNC: 7 MMOL/L
BASOPHILS # BLD AUTO: 0.02 X10(3)/MCL
BASOPHILS NFR BLD AUTO: 0.2 %
BENZODIAZ UR QL SCN: POSITIVE
BILIRUB SERPL-MCNC: 0.4 MG/DL
BILIRUB UR QL STRIP.AUTO: NEGATIVE
BLOOD GAS SAMPLE TYPE (OHS): ABNORMAL
BUN SERPL-MCNC: 10 MG/DL (ref 6–30)
BUN SERPL-MCNC: 10.7 MG/DL (ref 8.4–25.7)
CA-I BLD-SCNC: 1.19 MMOL/L (ref 1.12–1.23)
CALCIUM SERPL-MCNC: 7.4 MG/DL (ref 8.8–10)
CANNABINOIDS UR QL SCN: NEGATIVE
CHLORIDE SERPL-SCNC: 108 MMOL/L (ref 98–107)
CHLORIDE SERPL-SCNC: 99 MMOL/L (ref 95–110)
CHOLEST SERPL-MCNC: 127 MG/DL
CHOLEST/HDLC SERPL: 4 {RATIO} (ref 0–5)
CLARITY UR: CLEAR
CO2 BLDA-SCNC: 33.4 MMOL/L
CO2 SERPL-SCNC: 23 MMOL/L (ref 23–31)
COCAINE UR QL SCN: NEGATIVE
COLOR UR AUTO: ABNORMAL
CREAT SERPL-MCNC: 0.78 MG/DL (ref 0.73–1.18)
CREAT SERPL-MCNC: 0.9 MG/DL (ref 0.5–1.4)
CREAT/UREA NIT SERPL: 14
CV ECHO LV RWT: 0.6 CM
DOP CALC AO PEAK VEL: 1.98 M/S
DOP CALC AO VTI: 39 CM
DOP CALC LVOT PEAK VEL: 0.77 M/S
DOP CALCLVOT PEAK VEL VTI: 17.1 CM
DRAWN BY BLOOD GAS (OHS): ABNORMAL
E/A RATIO: 0.79
E/E' RATIO: 8.27 M/S
ECHO LV POSTERIOR WALL: 1.3 CM (ref 0.6–1.1)
EOSINOPHIL # BLD AUTO: 0 X10(3)/MCL (ref 0–0.9)
EOSINOPHIL NFR BLD AUTO: 0 %
ERYTHROCYTE [DISTWIDTH] IN BLOOD BY AUTOMATED COUNT: 12.3 % (ref 11.5–17)
EST. AVERAGE GLUCOSE BLD GHB EST-MCNC: 96.8 MG/DL
FENTANYL UR QL SCN: NEGATIVE
FRACTIONAL SHORTENING: 32 % (ref 28–44)
GFR SERPLBLD CREATININE-BSD FMLA CKD-EPI: >60 ML/MIN/1.73/M2
GLOBULIN SER-MCNC: 2.3 GM/DL (ref 2.4–3.5)
GLUCOSE SERPL-MCNC: 103 MG/DL (ref 82–115)
GLUCOSE SERPL-MCNC: 108 MG/DL (ref 70–110)
GLUCOSE UR QL STRIP: NORMAL
HBA1C MFR BLD: 5 %
HCO3 BLDA-SCNC: 32 MMOL/L (ref 22–26)
HCT VFR BLD AUTO: 34.7 % (ref 42–52)
HCT VFR BLD CALC: 34 %PCV (ref 36–54)
HDLC SERPL-MCNC: 32 MG/DL (ref 35–60)
HGB BLD-MCNC: 12 G/DL
HGB BLD-MCNC: 12 G/DL (ref 14–18)
HGB UR QL STRIP: ABNORMAL
IMM GRANULOCYTES # BLD AUTO: 0.08 X10(3)/MCL (ref 0–0.04)
IMM GRANULOCYTES NFR BLD AUTO: 0.7 %
INHALED O2 CONCENTRATION: 30 %
INR PPP: 1.8
INTERVENTRICULAR SEPTUM: 1.26 CM (ref 0.6–1.1)
KETONES UR QL STRIP: NEGATIVE
LDLC SERPL CALC-MCNC: 84 MG/DL (ref 50–140)
LEFT ATRIUM AREA SYSTOLIC (APICAL 2 CHAMBER): 15.9 CM2
LEFT ATRIUM AREA SYSTOLIC (APICAL 4 CHAMBER): 18.5 CM2
LEFT ATRIUM SIZE: 3.6 CM
LEFT ATRIUM VOLUME INDEX MOD: 22.2 ML/M2
LEFT ATRIUM VOLUME MOD: 44.8 CM3
LEFT INTERNAL DIMENSION IN SYSTOLE: 2.94 CM (ref 2.1–4)
LEFT VENTRICLE DIASTOLIC VOLUME INDEX: 41.14 ML/M2
LEFT VENTRICLE DIASTOLIC VOLUME: 83.1 ML
LEFT VENTRICLE END DIASTOLIC VOLUME APICAL 2 CHAMBER: 69.6 ML
LEFT VENTRICLE END DIASTOLIC VOLUME APICAL 4 CHAMBER: 79.5 ML
LEFT VENTRICLE END SYSTOLIC VOLUME APICAL 2 CHAMBER: 40.2 ML
LEFT VENTRICLE END SYSTOLIC VOLUME APICAL 4 CHAMBER: 44.6 ML
LEFT VENTRICLE MASS INDEX: 101 G/M2
LEFT VENTRICLE SYSTOLIC VOLUME INDEX: 16.5 ML/M2
LEFT VENTRICLE SYSTOLIC VOLUME: 33.3 ML
LEFT VENTRICULAR INTERNAL DIMENSION IN DIASTOLE: 4.3 CM (ref 3.5–6)
LEFT VENTRICULAR MASS: 203.04 G
LEUKOCYTE ESTERASE UR QL STRIP: NEGATIVE
LV LATERAL E/E' RATIO: 6.89 M/S
LV SEPTAL E/E' RATIO: 10.33 M/S
LVED V (TEICH): 83.1 ML
LVES V (TEICH): 33.3 ML
LVOT MG: 1 MMHG
LVOT MV: 0.55 CM/S
LYMPHOCYTES # BLD AUTO: 0.56 X10(3)/MCL (ref 0.6–4.6)
LYMPHOCYTES NFR BLD AUTO: 4.6 %
MCH RBC QN AUTO: 30.9 PG (ref 27–31)
MCHC RBC AUTO-ENTMCNC: 34.6 G/DL (ref 33–36)
MCV RBC AUTO: 89.4 FL (ref 80–94)
MDMA UR QL SCN: NEGATIVE
MECH RR (OHS): 18 B/MIN
MODE (OHS): AC
MONOCYTES # BLD AUTO: 0.87 X10(3)/MCL (ref 0.1–1.3)
MONOCYTES NFR BLD AUTO: 7.1 %
MUCOUS THREADS URNS QL MICRO: ABNORMAL /LPF
MV PEAK A VEL: 0.78 M/S
MV PEAK E VEL: 0.62 M/S
NEUTROPHILS # BLD AUTO: 10.72 X10(3)/MCL (ref 2.1–9.2)
NEUTROPHILS NFR BLD AUTO: 87.4 %
NITRITE UR QL STRIP: NEGATIVE
NRBC BLD AUTO-RTO: 0 %
OHS LV EJECTION FRACTION SIMPSONS BIPLANE MOD: 52 %
OHS QRS DURATION: 106 MS
OHS QTC CALCULATION: 425 MS
OPIATES UR QL SCN: NEGATIVE
OXYGEN DEVICE BLOOD GAS (OHS): ABNORMAL
PCO2 BLDA: 46 MMHG (ref 35–45)
PCP UR QL: NEGATIVE
PEEP RESPIRATORY: 5 CMH2O
PH BLDA: 7.45 [PH] (ref 7.35–7.45)
PH UR STRIP: 6 [PH]
PH UR: 6 [PH] (ref 3–11)
PISA TR MAX VEL: 2.55 M/S
PLATELET # BLD AUTO: 191 X10(3)/MCL (ref 130–400)
PMV BLD AUTO: 10.4 FL (ref 7.4–10.4)
PO2 BLDA: 64 MMHG (ref 80–100)
POC IONIZED CALCIUM: 1.13 MMOL/L (ref 1.06–1.42)
POC PTINR: 1.9 (ref 0.9–1.2)
POC PTWBT: 21.8 SEC (ref 9.7–14.3)
POC TCO2 (MEASURED): 27 MMOL/L (ref 23–29)
POCT GLUCOSE: 123 MG/DL (ref 70–110)
POTASSIUM BLD-SCNC: 3.3 MMOL/L (ref 3.5–5.1)
POTASSIUM BLOOD GAS (OHS): 3.4 MMOL/L (ref 3.5–5)
POTASSIUM SERPL-SCNC: 3.5 MMOL/L (ref 3.5–5.1)
PROT SERPL-MCNC: 4.7 GM/DL (ref 5.8–7.6)
PROT UR QL STRIP: ABNORMAL
PROTHROMBIN TIME: 20.9 SECONDS (ref 12.5–14.5)
PV PEAK GRADIENT: 2 MMHG
PV PEAK VELOCITY: 0.77 M/S
RA PRESSURE ESTIMATED: 8 MMHG
RBC # BLD AUTO: 3.88 X10(6)/MCL (ref 4.7–6.1)
RBC #/AREA URNS AUTO: ABNORMAL /HPF
RV TB RVSP: 11 MMHG
SAMPLE SITE BLOOD GAS (OHS): ABNORMAL
SAMPLE: ABNORMAL
SAMPLE: ABNORMAL
SAO2 % BLDA: 93 %
SODIUM BLD-SCNC: 140 MMOL/L (ref 136–145)
SODIUM BLOOD GAS (OHS): 138 MMOL/L (ref 137–145)
SODIUM SERPL-SCNC: 140 MMOL/L (ref 136–145)
SP GR UR STRIP.AUTO: 1.03 (ref 1–1.03)
SPECIFIC GRAVITY, URINE AUTO (.000) (OHS): 1.03 (ref 1–1.03)
SPONT+MECH VT ON VENT: 500 ML
SQUAMOUS #/AREA URNS LPF: ABNORMAL /HPF
TDI LATERAL: 0.09 M/S
TDI SEPTAL: 0.06 M/S
TDI: 0.08 M/S
TR MAX PG: 26 MMHG
TRICUSPID ANNULAR PLANE SYSTOLIC EXCURSION: 1.94 CM
TRIGL SERPL-MCNC: 55 MG/DL (ref 34–140)
TSH SERPL-ACNC: 0.58 UIU/ML (ref 0.35–4.94)
TV REST PULMONARY ARTERY PRESSURE: 34 MMHG
UROBILINOGEN UR STRIP-ACNC: NORMAL
VLDLC SERPL CALC-MCNC: 11 MG/DL
WBC # BLD AUTO: 12.25 X10(3)/MCL (ref 4.5–11.5)
WBC #/AREA URNS AUTO: ABNORMAL /HPF
Z-SCORE OF LEFT VENTRICULAR DIMENSION IN END DIASTOLE: -3.25
Z-SCORE OF LEFT VENTRICULAR DIMENSION IN END SYSTOLE: -1.71

## 2024-09-19 PROCEDURE — 85610 PROTHROMBIN TIME: CPT | Performed by: STUDENT IN AN ORGANIZED HEALTH CARE EDUCATION/TRAINING PROGRAM

## 2024-09-19 PROCEDURE — A9577 INJ MULTIHANCE: HCPCS | Performed by: INTERNAL MEDICINE

## 2024-09-19 PROCEDURE — 82803 BLOOD GASES ANY COMBINATION: CPT

## 2024-09-19 PROCEDURE — 20000000 HC ICU ROOM

## 2024-09-19 PROCEDURE — 94760 N-INVAS EAR/PLS OXIMETRY 1: CPT

## 2024-09-19 PROCEDURE — 25000003 PHARM REV CODE 250: Performed by: STUDENT IN AN ORGANIZED HEALTH CARE EDUCATION/TRAINING PROGRAM

## 2024-09-19 PROCEDURE — 99900031 HC PATIENT EDUCATION (STAT)

## 2024-09-19 PROCEDURE — 93010 ELECTROCARDIOGRAM REPORT: CPT | Mod: ,,, | Performed by: INTERNAL MEDICINE

## 2024-09-19 PROCEDURE — 5A1945Z RESPIRATORY VENTILATION, 24-96 CONSECUTIVE HOURS: ICD-10-PCS | Performed by: INTERNAL MEDICINE

## 2024-09-19 PROCEDURE — 63600175 PHARM REV CODE 636 W HCPCS: Performed by: STUDENT IN AN ORGANIZED HEALTH CARE EDUCATION/TRAINING PROGRAM

## 2024-09-19 PROCEDURE — 63600175 PHARM REV CODE 636 W HCPCS

## 2024-09-19 PROCEDURE — 99222 1ST HOSP IP/OBS MODERATE 55: CPT | Mod: ,,, | Performed by: PSYCHIATRY & NEUROLOGY

## 2024-09-19 PROCEDURE — 25500020 PHARM REV CODE 255: Performed by: STUDENT IN AN ORGANIZED HEALTH CARE EDUCATION/TRAINING PROGRAM

## 2024-09-19 PROCEDURE — 95819 EEG AWAKE AND ASLEEP: CPT

## 2024-09-19 PROCEDURE — 95822 EEG COMA OR SLEEP ONLY: CPT | Mod: 26,,, | Performed by: PSYCHIATRY & NEUROLOGY

## 2024-09-19 PROCEDURE — 93005 ELECTROCARDIOGRAM TRACING: CPT

## 2024-09-19 PROCEDURE — 85025 COMPLETE CBC W/AUTO DIFF WBC: CPT | Performed by: STUDENT IN AN ORGANIZED HEALTH CARE EDUCATION/TRAINING PROGRAM

## 2024-09-19 PROCEDURE — 99900035 HC TECH TIME PER 15 MIN (STAT)

## 2024-09-19 PROCEDURE — 80061 LIPID PANEL: CPT | Performed by: STUDENT IN AN ORGANIZED HEALTH CARE EDUCATION/TRAINING PROGRAM

## 2024-09-19 PROCEDURE — 84443 ASSAY THYROID STIM HORMONE: CPT | Performed by: STUDENT IN AN ORGANIZED HEALTH CARE EDUCATION/TRAINING PROGRAM

## 2024-09-19 PROCEDURE — 82962 GLUCOSE BLOOD TEST: CPT

## 2024-09-19 PROCEDURE — 27200966 HC CLOSED SUCTION SYSTEM

## 2024-09-19 PROCEDURE — 27100171 HC OXYGEN HIGH FLOW UP TO 24 HOURS

## 2024-09-19 PROCEDURE — 83036 HEMOGLOBIN GLYCOSYLATED A1C: CPT | Performed by: STUDENT IN AN ORGANIZED HEALTH CARE EDUCATION/TRAINING PROGRAM

## 2024-09-19 PROCEDURE — 99900026 HC AIRWAY MAINTENANCE (STAT)

## 2024-09-19 PROCEDURE — 99900022

## 2024-09-19 PROCEDURE — 25500020 PHARM REV CODE 255: Performed by: INTERNAL MEDICINE

## 2024-09-19 PROCEDURE — 36600 WITHDRAWAL OF ARTERIAL BLOOD: CPT

## 2024-09-19 PROCEDURE — 99285 EMERGENCY DEPT VISIT HI MDM: CPT | Mod: 25

## 2024-09-19 PROCEDURE — 94761 N-INVAS EAR/PLS OXIMETRY MLT: CPT | Mod: XB

## 2024-09-19 PROCEDURE — 81001 URINALYSIS AUTO W/SCOPE: CPT | Performed by: STUDENT IN AN ORGANIZED HEALTH CARE EDUCATION/TRAINING PROGRAM

## 2024-09-19 PROCEDURE — 80307 DRUG TEST PRSMV CHEM ANLYZR: CPT | Performed by: STUDENT IN AN ORGANIZED HEALTH CARE EDUCATION/TRAINING PROGRAM

## 2024-09-19 PROCEDURE — 94002 VENT MGMT INPAT INIT DAY: CPT

## 2024-09-19 PROCEDURE — 80053 COMPREHEN METABOLIC PANEL: CPT | Performed by: STUDENT IN AN ORGANIZED HEALTH CARE EDUCATION/TRAINING PROGRAM

## 2024-09-19 RX ORDER — ESCITALOPRAM OXALATE 5 MG/1
5 TABLET ORAL DAILY
Status: DISCONTINUED | OUTPATIENT
Start: 2024-09-19 | End: 2024-09-24 | Stop reason: HOSPADM

## 2024-09-19 RX ORDER — CHLORHEXIDINE GLUCONATE ORAL RINSE 1.2 MG/ML
15 SOLUTION DENTAL 2 TIMES DAILY
Status: DISCONTINUED | OUTPATIENT
Start: 2024-09-19 | End: 2024-09-24 | Stop reason: HOSPADM

## 2024-09-19 RX ORDER — SODIUM CHLORIDE 0.9 % (FLUSH) 0.9 %
10 SYRINGE (ML) INJECTION
Status: DISCONTINUED | OUTPATIENT
Start: 2024-09-19 | End: 2024-09-24 | Stop reason: HOSPADM

## 2024-09-19 RX ORDER — LORAZEPAM 0.5 MG/1
0.5 TABLET ORAL EVERY 12 HOURS PRN
COMMUNITY
Start: 2024-07-08

## 2024-09-19 RX ORDER — RIVAROXABAN 20 MG/1
20 TABLET, FILM COATED ORAL DAILY
COMMUNITY

## 2024-09-19 RX ORDER — TAMSULOSIN HYDROCHLORIDE 0.4 MG/1
1 CAPSULE ORAL DAILY
COMMUNITY
Start: 2024-07-08

## 2024-09-19 RX ORDER — PROPOFOL 10 MG/ML
INJECTION, EMULSION INTRAVENOUS
Status: COMPLETED
Start: 2024-09-19 | End: 2024-09-19

## 2024-09-19 RX ORDER — SODIUM CHLORIDE 900 MG/100ML
INJECTION INTRAVENOUS
Status: DISCONTINUED | OUTPATIENT
Start: 2024-09-19 | End: 2024-09-24 | Stop reason: HOSPADM

## 2024-09-19 RX ORDER — LORAZEPAM 2 MG/ML
2 INJECTION INTRAMUSCULAR
Status: DISCONTINUED | OUTPATIENT
Start: 2024-09-19 | End: 2024-09-24 | Stop reason: HOSPADM

## 2024-09-19 RX ORDER — LORAZEPAM 0.5 MG/1
0.5 TABLET ORAL EVERY 12 HOURS PRN
COMMUNITY
End: 2024-09-19

## 2024-09-19 RX ORDER — MUPIROCIN 20 MG/G
OINTMENT TOPICAL 2 TIMES DAILY
Status: DISCONTINUED | OUTPATIENT
Start: 2024-09-21 | End: 2024-09-24 | Stop reason: HOSPADM

## 2024-09-19 RX ORDER — PANTOPRAZOLE SODIUM 40 MG/10ML
40 INJECTION, POWDER, LYOPHILIZED, FOR SOLUTION INTRAVENOUS DAILY
Status: DISCONTINUED | OUTPATIENT
Start: 2024-09-19 | End: 2024-09-20

## 2024-09-19 RX ORDER — CLOPIDOGREL BISULFATE 75 MG/1
75 TABLET ORAL DAILY
COMMUNITY
Start: 2024-07-08

## 2024-09-19 RX ORDER — IPRATROPIUM BROMIDE 42 UG/1
2 SPRAY, METERED NASAL 3 TIMES DAILY
COMMUNITY

## 2024-09-19 RX ORDER — DILTIAZEM HYDROCHLORIDE 120 MG/1
1 TABLET, FILM COATED ORAL DAILY
COMMUNITY

## 2024-09-19 RX ORDER — FLUTICASONE PROPIONATE 50 MCG
1 SPRAY, SUSPENSION (ML) NASAL DAILY
COMMUNITY

## 2024-09-19 RX ORDER — ESCITALOPRAM OXALATE 5 MG/1
5 TABLET ORAL DAILY
COMMUNITY
Start: 2024-07-08

## 2024-09-19 RX ORDER — MEMANTINE HYDROCHLORIDE 10 MG/1
10 TABLET ORAL 2 TIMES DAILY
COMMUNITY
Start: 2024-09-05

## 2024-09-19 RX ORDER — PROPOFOL 10 MG/ML
0-50 INJECTION, EMULSION INTRAVENOUS CONTINUOUS
Status: DISCONTINUED | OUTPATIENT
Start: 2024-09-19 | End: 2024-09-20

## 2024-09-19 RX ORDER — HYDROCODONE BITARTRATE AND ACETAMINOPHEN 10; 325 MG/1; MG/1
1 TABLET ORAL EVERY 6 HOURS PRN
COMMUNITY
Start: 2024-07-08

## 2024-09-19 RX ADMIN — SODIUM CHLORIDE 1000 ML: 900 INJECTION INTRAVENOUS at 06:09

## 2024-09-19 RX ADMIN — PANTOPRAZOLE SODIUM 40 MG: 40 INJECTION, POWDER, LYOPHILIZED, FOR SOLUTION INTRAVENOUS at 08:09

## 2024-09-19 RX ADMIN — PROPOFOL 30 MCG/KG/MIN: 10 INJECTION, EMULSION INTRAVENOUS at 01:09

## 2024-09-19 RX ADMIN — PROPOFOL 25 MCG/KG/MIN: 10 INJECTION, EMULSION INTRAVENOUS at 08:09

## 2024-09-19 RX ADMIN — PROPOFOL 30 MCG/KG/MIN: 10 INJECTION, EMULSION INTRAVENOUS at 09:09

## 2024-09-19 RX ADMIN — PROPOFOL 10 MCG/KG/MIN: 10 INJECTION, EMULSION INTRAVENOUS at 12:09

## 2024-09-19 RX ADMIN — IOHEXOL 100 ML: 350 INJECTION, SOLUTION INTRAVENOUS at 12:09

## 2024-09-19 RX ADMIN — GADOBENATE DIMEGLUMINE 19 ML: 529 INJECTION, SOLUTION INTRAVENOUS at 06:09

## 2024-09-19 RX ADMIN — CHLORHEXIDINE GLUCONATE 0.12% ORAL RINSE 15 ML: 1.2 LIQUID ORAL at 08:09

## 2024-09-19 RX ADMIN — LEVETIRACETAM 500 MG: 100 INJECTION, SOLUTION INTRAVENOUS at 08:09

## 2024-09-19 RX ADMIN — SODIUM CHLORIDE 30 ML/HR: 900 INJECTION INTRAVENOUS at 12:09

## 2024-09-19 RX ADMIN — LEVETIRACETAM 500 MG: 100 INJECTION, SOLUTION INTRAVENOUS at 09:09

## 2024-09-19 RX ADMIN — PROPOFOL 30 MCG/KG/MIN: 10 INJECTION, EMULSION INTRAVENOUS at 02:09

## 2024-09-19 RX ADMIN — ESCITALOPRAM OXALATE 5 MG: 5 TABLET, FILM COATED ORAL at 08:09

## 2024-09-19 RX ADMIN — PROPOFOL 20 MCG/KG/MIN: 10 INJECTION, EMULSION INTRAVENOUS at 06:09

## 2024-09-19 NOTE — Clinical Note
Diagnosis: Endotracheally intubated [[id number]]   Future Attending Provider: BEL KING [[id number]]   Admit to which facility:: [facility] [[id number]]   Reason for IP Medical Treatment  (Clinical interventions that can only be accomplished in the IP setting? ) :: vent management   Plans for Post-Acute care--if anticipated (pick the single best option):: A. No post acute care anticipated at this time   Special Needs:: No Special Needs [1]

## 2024-09-19 NOTE — NURSING
Nurses Note -- 4 Eyes      9/19/2024   5:27 AM      Skin assessed during: Admit      [] No Altered Skin Integrity Present    []Prevention Measures Documented      [x] Yes- Altered Skin Integrity Present or Discovered   [x] LDA Added if Not in Epic (Describe Wound)   [x] New Altered Skin Integrity was Present on Admit and Documented in LDA   [x] Wound Image Taken    Wound Care Consulted? No    Attending Nurse:  Chante Gifford RN/Staff Member:   Rigoberto PAREDES RN

## 2024-09-19 NOTE — HPI
Gerald J Lejeune is a 79 y.o. male with past medical history of hypertension, hyperlipidemia, PAD s/p stent 2019 on Xarelto and Plavix, COPD, and former smoker  who presented to [facility] as a transfer from [place]. Per chart, patient's girlfriend found him unresponsive followed by confusion and right avila gaze. LKN  8:15 on 9/18/2024. EMS reported seizure activity while en route to Hospital. He was treated with versed. GCS of 3 upon arrival. Patient was intubated. CT imaging not available and OSH. Patient transferred for Neurology Services. Upon arrival to [facility], patient intubated, awake, following commands and GRANADOS's ( hx of R BKA). CT Head negative for acute abnormality.  CTA H/N revealing for fusiform dilation of the anterior communicating artery without definite saccular aneurysm, negative for LVO or flow limiting stenosis.  OOW for TNK and exam and imaging thought to be not consistent with stroke. Patient was admitted to ICU for ventilator management. Vascular Neurology consulted for further evaluation.     Repeat CXR completed this morning and findings suggestive of large right pleural effusion and extensive airspace disease. Per notes Intensivist is concerned for the possibility of malignancy with large right pleural effusion and possible brain metastasis (not appreciated on CT brain) to explain new onset seizures in the differential diagnostic possibilities.     Upon assessment, patient is intubated, not on sedation, following commands. Antigravity in BUE, weak right hand .

## 2024-09-19 NOTE — ASSESSMENT & PLAN NOTE
- presented with AMS and brief episode of unresponsiveness, followed by witness seizure activity by EMS  - Stroke RF: HTN and HLD  - Intervention: no tnk, symptoms improved, felt to be stroke mimic 2/2 witness seizure  - Etiology: TBD, Seizure vs. CVA,     Stroke workup:  -CTh: No acute intracranial abnormality identified. Findings of chronic microvascular ischemic disease.   -CTA h/n: 1. No large vessel occlusion or flow-limiting stenosis.  2. 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  3. Atherosclerotic changes at the carotid bulbs with 20-30% stenosis on the right and 30-40% stenosis on the left by NASCET criteria.  4. Bulky mediastinal lymphadenopathy with right lung collapse and pleural effusion.   -MRI brain:    -ECHO:    -CUS:   -LDL: 84   -TSH: 0.582   -home medications include: Plavix and Xarelto      Plan:  Continue stroke workup. ECHO with BS pending.  Continue Keppra per ICU, EEG ordered.   MRI Brain w/wo to rule metastatic disease/CVA  Follow up ordered with Interventional Neurology in 2 months.  Allow permissive HTN ... prn hydralazine and labetalol for SBP > 220 or DBP > 120     - after 24 hours from symptom onset, ok to normalize blood pressure  Neuro checks q1hr ... stat CTh if any neuro change   Hold AP/AC   Continuous telemetry monitoring  PT/OT/SLP to evaluate when able to participate    Further recommendations per MD.

## 2024-09-19 NOTE — ED PROVIDER NOTES
Encounter Date: 9/18/2024    SCRIBE #1 NOTE: I, Carly Alva, am scribing for, and in the presence of,  Dutch Lora IV, MD. I have scribed the following portions of the note - the EKG reading. Other sections scribed: HPI, ROS, PE.       History     Chief Complaint   Patient presents with    transfer     Pt transfer from [facility] on ventilator for stroke like symptoms. Last known normal was at 2015.  on arrival.     79 year old male with history of dementia presents to the ED as a transfer from [place] for neuro services. EMS reports that pt's family noticed rightward gaze at 2015.  Pt slumped over to the right and had a seizure en route per EMS and was given narcan and versed on the way to [place]. Pt was GCS3 on arrival to [place] ER, was intubated at [place] and was transferred here due to scans being down. Pt's vitals were stable en route. History from pt is unobtainable due to intubation.     The history is provided by the EMS personnel. The history is limited by the condition of the patient. No  was used.     Review of patient's allergies indicates:  No Known Allergies  No past medical history on file.  No past surgical history on file.  No family history on file.     Review of Systems   Unable to perform ROS: Intubated       Physical Exam     Initial Vitals [09/19/24 0002]   BP Pulse Resp Temp SpO2   125/78 80 20 98.1 °F (36.7 °C) 100 %      MAP       --         Physical Exam    Nursing note and vitals reviewed.  Constitutional: He is not diaphoretic. No distress.   HENT:   Head: Normocephalic and atraumatic.   Neck: Neck supple.   Normal range of motion.  Cardiovascular:  Normal rate and regular rhythm.           Pulmonary/Chest: Breath sounds normal. No respiratory distress.   Intubated with 7.5 ET tube, 21 at the lip.    Abdominal: Abdomen is soft. He exhibits no distension. There is no abdominal tenderness.   Musculoskeletal:         General: No edema.      Cervical back: Normal  range of motion and neck supple.     Neurological: He is alert. He has normal strength. No cranial nerve deficit or sensory deficit.   Follows commands. Normal gaze in both directions. Moving bilateral UE equally   Skin: Skin is warm. Capillary refill takes less than 2 seconds.         ED Course   Critical Care    Date/Time: 9/19/2024 4:01 AM    Performed by: Dutch Lora IV, MD  Authorized by: Dutch Lora IV, MD  Total critical care time (exclusive of procedural time) : 35 minutes  Critical care time was exclusive of separately billable procedures and treating other patients.  Critical care was necessary to treat or prevent imminent or life-threatening deterioration of the following conditions: respiratory failure.  Critical care was time spent personally by me on the following activities: discussions with consultants, development of treatment plan with patient or surrogate, blood draw for specimens, evaluation of patient's response to treatment, interpretation of cardiac output measurements, examination of patient, obtaining history from patient or surrogate, ordering and performing treatments and interventions, ordering and review of laboratory studies, ordering and review of radiographic studies, pulse oximetry, re-evaluation of patient's condition and review of old charts.        Labs Reviewed   COMPREHENSIVE METABOLIC PANEL - Abnormal       Result Value    Sodium 140      Potassium 3.5      Chloride 108 (*)     CO2 23      Glucose 103      Blood Urea Nitrogen 10.7      Creatinine 0.78      Calcium 7.4 (*)     Protein Total 4.7 (*)     Albumin 2.4 (*)     Globulin 2.3 (*)     Albumin/Globulin Ratio 1.0 (*)     Bilirubin Total 0.4      ALP 59      ALT 11      AST 13      eGFR >60      Anion Gap 9.0      BUN/Creatinine Ratio 14     PROTIME-INR - Abnormal    PT 20.9 (*)     INR 1.8 (*)    LIPID PANEL - Abnormal    Cholesterol Total 127      HDL Cholesterol 32 (*)     Triglyceride 55      Cholesterol/HDL  Ratio 4      Very Low Density Lipoprotein 11      LDL Cholesterol 84.00     CBC WITH DIFFERENTIAL - Abnormal    WBC 12.25 (*)     RBC 3.88 (*)     Hgb 12.0 (*)     Hct 34.7 (*)     MCV 89.4      MCH 30.9      MCHC 34.6      RDW 12.3      Platelet 191      MPV 10.4      Neut % 87.4      Lymph % 4.6      Mono % 7.1      Eos % 0.0      Basophil % 0.2      Lymph # 0.56 (*)     Neut # 10.72 (*)     Mono # 0.87      Eos # 0.00      Baso # 0.02      IG# 0.08 (*)     IG% 0.7      NRBC% 0.0     ISTAT PROCEDURE - Abnormal    POC PTWBT 21.8 (*)     POC PTINR 1.9 (*)     Sample VENOUS     ISTAT CHEM8 - Abnormal    POC Glucose 108      POC BUN 10      POC Creatinine 0.9      POC Sodium 140      POC Potassium 3.3 (*)     POC Chloride 99      POC TCO2 (MEASURED) 27      POC Anion Gap 18 (*)     POC Ionized Calcium 1.13      POC Hematocrit 34 (*)     POC HEMOGLOBIN 12      Sample VENOUS     TSH - Normal    TSH 0.582     CBC W/ AUTO DIFFERENTIAL    Narrative:     The following orders were created for panel order CBC W/ AUTO DIFFERENTIAL.  Procedure                               Abnormality         Status                     ---------                               -----------         ------                     CBC with Differential[[id number]]       Abnormal            Final result                 Please view results for these tests on the individual orders.   BLOOD GAS   DRUG SCREEN, URINE (BEAKER)   POCT GLUCOSE, HAND-HELD DEVICE     EKG Readings: (Independently Interpreted)   Rhythm: Normal Sinus Rhythm. Heart Rate: 76. Ectopy: No Ectopy. Conduction: Normal. ST Segments: Normal ST Segments. T Waves: Normal. Clinical Impression: Normal Sinus Rhythm   EKG performed at 0134.        Imaging Results              X-Ray Chest 1 View for Line/Tube Placement (In process)                      CTA Head and Neck (xpd) (Preliminary result)  Result time 09/19/24 00:52:52      Preliminary result by Freddy Sanchez Jr., MD (09/19/24  00:52:52)                   Narrative:    START OF REPORT:  Technique: CT angiogram of the intracranial vessels was performed with intravenous contrast with direct axial as well as sagittal and coronal reformations. CT angiogram of the neck vessels was performed with intravenous contrast with direct axial as well as sagittal and coronal reformations.    Comparison: None.    Clinical history: Code stroke; right gaze.    Findings:  Intracranial Vascular structures:  Internal carotid arteries: Mild atheromatous calcification of the proximal cavernous clinoid and supraclinoid segments of the bilateral internal carotid arteries is seen with mild stenosis.  Middle cerebral arteries: Unremarkable.  Anterior cerebral arteries: There is ectasia of the anterior communicating artery measuring 3 mm in widest diameter.  Vertebral arteries: The right vertebral artery is dominant.  Basilar artery: Unremarkable.  Posterior cerebral arteries: Unremarkable.  Posterior communicating arteries: Unremarkable.  Jugular Veins and venous sinuses: Unremarkable.  Neck Vascular structures: Mild atheromatous calcification with is seen at the origin of the bilateral brachiocephalic subclavian arteries.  Carotids:  Common carotid arteries: Unremarkable.  Internal carotid artery: Mild atheromatous calcification with mild stenosis at the origin of the left internal carotid artery is seen.  Vertebral arteries: Aortic arch origin of the left vertebral artery noted.  Jugular Veins and venous sinuses: Unremarkable.  Brain parenchyma: No abnormal intracranial enhancement is seen on the post contrast images.    Miscellaneous: There is a left pleural effusion with consolidated collapse of the right upper lobe partially imaged. An endotracheal tube is in place.      Impression:  1. Mild atheromatous calcification of the proximal cavernous clinoid and supraclinoid segments of the bilateral internal carotid arteries is seen with mild stenosis.  2. Mild  atheromatous calcification with mild stenosis at the origin of the left internal carotid artery is seen.  3. There is ectasia of the anterior communicating artery measuring 3 mm in widest diameter.  4. There is a left pleural effusion with consolidated collapse of the right upper lobe partially imaged. An endotracheal tube is in place.  5. Details and other findings as described above.                                         CT HEAD FOR STROKE (Preliminary result)  Result time 09/19/24 00:36:32      Preliminary result by Freddy Sanchez Jr., MD (09/19/24 00:36:32)                   Narrative:    START OF REPORT:  Technique: CT of the head was performed without intravenous contrast with axial as well as coronal and sagittal images.    Comparison: Comparison is with study dated 2023-11-04 16:19:45.    Dosage Information: Automated exposure control was utilized.    Clinical history: Rt side gaze.    Findings:  Hemorrhage: No acute intracranial hemorrhage is seen.  CSF spaces: The ventricles, sulci and basal cisterns all appear mildly prominent consistent with global cerebral atrophy.  Brain parenchyma: Mild to moderate stable appearing microvascular change is seen in portions of the periventricular and deep white matter tracts.  Cerebellum: Unremarkable.  Vascular: Unremarkable venous sinuses. Moderate to severe atheromatous calcification of the intracranial arteries is seen.  Sella and skull base: The sella appears to be within normal limits for age.  Intracranial calcifications: Incidental note is made of bilateral choroid plexus calcification. Incidental note is made of some pineal region calcification. Incidental note is made of some calcification of the falx.  Calvarium: No acute linear or depressed skull fracture is seen.    Maxillofacial Structures:  Paranasal sinuses: The visualized paranasal sinuses appear clear with no mucoperiosteal thickening or air fluid levels identified.  Orbits: The orbits appear  unremarkable.  Zygomatic arches: The zygomatic arches are intact and unremarkable.  Temporal bones and mastoids: The temporal bones and mastoids appear unremarkable.  TMJ: The mandibular condyles appear normally placed with respect to the mandibular fossa.    Notifications: This is a stroke protocol report and the results were discussed with the emergency room physician Dr. Lora prior to dictation at 2024-09-19 00:35:11 CDT.      Impression:  1. No acute intracranial process identified. Details and other findings as noted above.                                         Medications   0.9% NaCl IVPB (30 mL/hr Intravenous New Bag 9/19/24 0039)   propofol (DIPRIVAN) 10 mg/mL infusion (30 mcg/kg/min × 87.9 kg (Dosing Weight) Intravenous New Bag 9/19/24 0226)   sodium chloride 0.9% flush 10 mL (has no administration in time range)   mupirocin 2 % ointment (has no administration in time range)   chlorhexidine 0.12 % solution 15 mL (has no administration in time range)   LORazepam injection 2 mg (has no administration in time range)   levETIRAcetam (Keppra) 500 mg in D5W 100 mL IVPB (MB+) (has no administration in time range)   pantoprazole injection 40 mg (has no administration in time range)   rivaroxaban tablet 20 mg (has no administration in time range)   EScitalopram oxalate tablet 5 mg (has no administration in time range)   iohexoL (OMNIPAQUE 350) injection 100 mL (100 mLs Intravenous Given 9/19/24 0027)     Medical Decision Making  79-year-old initially presented to outside hospital GCS of 3 after seizure EN route given benzos intubated on arrival to outside hospital  Family apparently reported to EMS that patient had a gaze deficit before he seized so concern for stroke - activated as code fast on arrival here  My exam on arrival here and throughout ER stay patient intubated but awake following commands no gaze deficit moving both upper extremities equally no focal deficits noted with limited exam given intubation  and inability to assess for speech deficits  Discussed all these findings with stroke Neurology recommend no thrombolytics will admit to ICU for remainder of workup and care at this time    Differential diagnosis (including but not limited to):   Judging by the patient's chief complaint and pertinent history, the patient has the following possible differential diagnoses, including but not limited to the following.  Some of these are deemed to be lower likelihood and some more likely based on my physical exam and history combined with possible lab work and/or imaging studies.   Please see the pertinent studies, and refer to the HPI.  Some of these diagnoses will take further evaluation to fully rule out, perhaps as an outpatient and the patient was encouraged to follow up when discharged for more comprehensive evaluation.    Metabolic abnormality, intoxication, toxic ingestion, CVA, infection, structural (SAH, ICH, trauma, neoplastic), seizure/postictal, polypharmacy       Problems Addressed:  Altered mental status, unspecified altered mental status type: acute illness or injury that poses a threat to life or bodily functions  Endotracheally intubated: acute illness or injury that poses a threat to life or bodily functions    Amount and/or Complexity of Data Reviewed  Independent Historian: EMS     Details:  EMS reports that pt's family noticed rightward gaze at 2015. Pt was confused at the time and was a GCS of 3 when EMS arrived on scene. Pt slumped over to the right and had a seizure en route and was given narcan and versed on the way to [place]. Pt was given propofol and intubated at [place] and was transferred here due to scans being down. Pt's vitals were stable en route.     Gived keppra at OSH     Labs: ordered.  Radiology: ordered and independent interpretation performed.     Details: Head CT - no obvious bleed or mass     Discussion of management or test interpretation with external provider(s): Discussed  with stroke interventional Dr. Juan M Werner - no thrombolytics, he is available for assistance if LVO shows something   Discussed with ICU resident - will admit      Risk  Prescription drug management.  Decision regarding hospitalization.    Critical Care  Total time providing critical care: 35 minutes              Attending Attestation:           Physician Attestation for Scribe:  Physician Attestation Statement for Scribe #1: I, Dutch Lora IV, MD, reviewed documentation, as scribed by Carly Alva in my presence, and it is both accurate and complete.             ED Course as of 09/19/24 0402   Thu Sep 19, 2024   0032 Dr. Weaver with neurointerventional - recommends no thrombolytics at this time follow up CTA.  On reassessment patient was still moving both upper extremities equally and purposefully following commands looking both directions without any gaze deficits noted [AC]      ED Course User Index  [AC] Dutch Lora IV, MD                             Clinical Impression:  Final diagnoses:  [Z97.8] Endotracheally intubated  [R41.82] Altered mental status, unspecified altered mental status type (Primary)          ED Disposition Condition    Admit Stable                Dutch Lora IV, MD  09/19/24 0402

## 2024-09-19 NOTE — CONSULTS
[facility] - 7 North ICU  Neurology  Consult Note    Patient Name: Gerald J Lejeune  MRN: [id number]  Admission Date: 9/19/2024  Hospital Length of Stay: 0 days  Code Status: Full Code   Attending Provider: BEL Jerome MD   Consulting Provider: Ifeoma Romero NP  Primary Care Physician: No, Primary Doctor  Principal Problem:Seizure    Inpatient consult to Neurology  Consult performed by: Ifeoma Romero NP  Consult ordered by: Lee Suarez Jr., MD, [facility]      Inpatient consult to Vascular (Stroke) Neurology  Consult performed by: Ifeoma Romero NP  Consult ordered by: Zoraida Toribio MD         Subjective:     Chief Complaint:    Chief Complaint   Patient presents with    transfer     Pt transfer from [facility] on ventilator for stroke like symptoms. Last known normal was at 2015.  on arrival.          HPI:   Gerald J Lejeune is a 79 y.o. male with past medical history of hypertension, hyperlipidemia, PAD s/p stent 2019 on Xarelto and Plavix, COPD, and former smoker  who presented to [facility] as a transfer from [place]. Per chart, patient's girlfriend found him unresponsive followed by confusion and right avila gaze. LKN  8:15 on 9/18/2024. EMS reported seizure activity while en route to Hospital. He was treated with versed. GCS of 3 upon arrival. Patient was intubated. CT imaging not available and OSH. Patient transferred for Neurology Services. Upon arrival to [facility], patient intubated, awake, following commands and GRANADOS's ( hx of R BKA). CT Head and CTA H/N unremarkable. OOW for TNK and exam and imaging thought to be not consistent with stroke. Patient was admitted to ICU for ventilator management.     Repeat CXR completed this morning and findings suggestive of large right pleural effusion and extensive airspace disease. Per notes Intensivist is concerned for the possibility of malignancy with large right pleural effusion and possible brain metastasis (not appreciated on CT brain) to  explain new onset seizures in the differential diagnostic possibilities.    Upon assessment, patient is intubated, not on sedation, following commands. Antigravity in BUE, weak right hand .           No past medical history on file.    No past surgical history on file.    Review of patient's allergies indicates:  No Known Allergies    Current Neurological Medications: Keppra    No current facility-administered medications on file prior to encounter.     Current Outpatient Medications on File Prior to Encounter   Medication Sig    clopidogreL (PLAVIX) 75 mg tablet Take 75 mg by mouth once daily.    EScitalopram oxalate (LEXAPRO) 5 MG Tab Take 5 mg by mouth once daily.    HYDROcodone-acetaminophen (NORCO)  mg per tablet Take 1 tablet by mouth every 6 (six) hours as needed for Pain.    LORazepam (ATIVAN) 0.5 MG tablet Take 0.5 mg by mouth every 12 (twelve) hours as needed for Anxiety.    memantine (NAMENDA) 10 MG Tab Take 10 mg by mouth 2 (two) times daily.    tamsulosin (FLOMAX) 0.4 mg Cap Take 1 capsule by mouth once daily.    diltiaZEM (CARDIZEM) 120 MG tablet Take 1 tablet by mouth once daily.    fluticasone propionate (FLONASE) 50 mcg/actuation nasal spray 1 spray by Each Nostril route once daily.    ipratropium (ATROVENT) 42 mcg (0.06 %) nasal spray 2 sprays by Each Nostril route 3 (three) times daily.    XARELTO 20 mg Tab Take 20 mg by mouth once daily.    [DISCONTINUED] LORazepam (ATIVAN) 0.5 MG tablet Take 0.5 mg by mouth every 12 (twelve) hours as needed for Anxiety.     Family History    None       Tobacco Use    Smoking status: Not on file    Smokeless tobacco: Not on file   Substance and Sexual Activity    Alcohol use: Not on file    Drug use: Not on file    Sexual activity: Not on file     Review of Systems   Unable to perform ROS: Intubated     Objective:     Vital Signs (Most Recent):  Temp: 98.4 °F (36.9 °C) (09/19/24 0800)  Pulse: 70 (09/19/24 0845)  Resp: 18 (09/19/24 0845)  BP: 130/76  (09/19/24 0830)  SpO2: (!) 93 % (09/19/24 0845) Vital Signs (24h Range):  Temp:  [98.1 °F (36.7 °C)-99.1 °F (37.3 °C)] 98.4 °F (36.9 °C)  Pulse:  [60-83] 70  Resp:  [16-36] 18  SpO2:  [93 %-100 %] 93 %  BP: ()/(66-94) 130/76     Weight: 87.9 kg (193 lb 12.6 oz)  Body mass index is 29.46 kg/m².     Physical Exam  Vitals reviewed.   Constitutional:       Appearance: Normal appearance.   HENT:      Head: Normocephalic and atraumatic.      Nose: Nose normal.      Mouth/Throat:      Comments: ETT    Eyes:      Extraocular Movements: Extraocular movements intact.      Pupils: Pupils are equal, round, and reactive to light.   Pulmonary:      Effort: Pulmonary effort is normal.   Abdominal:      Palpations: Abdomen is soft.   Musculoskeletal:         General: Normal range of motion.      Cervical back: Normal range of motion.   Skin:     General: Skin is warm and dry.      Capillary Refill: Capillary refill takes less than 2 seconds.   Neurological:      General: No focal deficit present.      Mental Status: He is alert and oriented to person, place, and time.      Comments: Following commands  Pupils, equal round and reactive  Antigravity in all 4's  RUE weaker than LUE  Sensation intact bilaterally  Face symmetric bilaterally   Psychiatric:         Mood and Affect: Mood normal.         Behavior: Behavior normal.          NEUROLOGICAL EXAMINATION:     MENTAL STATUS   Oriented to person, place, and time.     CRANIAL NERVES     CN III, IV, VI   Pupils are equal, round, and reactive to light.      Significant Labs: CBC:   Recent Labs   Lab 09/19/24  0035 09/19/24  0102   WBC  --  12.25*   HGB  --  12.0*   HCT 34* 34.7*   PLT  --  191     CMP:   Recent Labs   Lab 09/19/24  0102      K 3.5   *   CO2 23   BUN 10.7   CREATININE 0.78   CALCIUM 7.4*   ALBUMIN 2.4*   BILITOT 0.4   ALKPHOS 59   AST 13   ALT 11       Significant Imaging: I have reviewed all pertinent imaging results/findings within the past 24  hours.  Assessment and Plan:     AMS (altered mental status)  - presented with AMS and brief episode of unresponsiveness, followed by witness seizure activity by EMS  - Stroke RF: HTN and HLD  - Intervention: no tnk, symptoms improved, felt to be stroke mimic 2/2 witness seizure  - Etiology: TBD, Seizure vs. CVA,     Stroke workup:  -CTh: No acute intracranial abnormality identified. Findings of chronic microvascular ischemic disease.   -CTA h/n: 1. No large vessel occlusion or flow-limiting stenosis.  2. 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  3. Atherosclerotic changes at the carotid bulbs with 20-30% stenosis on the right and 30-40% stenosis on the left by NASCET criteria.  4. Bulky mediastinal lymphadenopathy with right lung collapse and pleural effusion.   -MRI brain:    -ECHO:    -CUS:   -LDL: 84   -TSH: 0.582   -home medications include: Plavix and Xarelto      Plan:  Continue stroke workup. ECHO with BS pending.  Continue Keppra per ICU, EEG ordered.   MRI Brain w/wo to rule metastatic disease/CVA  Follow up ordered with Interventional Neurology in 2 months.  Allow permissive HTN ... prn hydralazine and labetalol for SBP > 220 or DBP > 120     - after 24 hours from symptom onset, ok to normalize blood pressure  Neuro checks q1hr ... stat CTh if any neuro change   Hold AP/AC   Continuous telemetry monitoring  PT/OT/SLP to evaluate when able to participate    Further recommendations per MD.               VTE Risk Mitigation (From admission, onward)           Ordered     IP VTE HIGH RISK PATIENT  Once         09/19/24 0144     Place sequential compression device  Until discontinued         09/19/24 0144                    Thank you for your consult.  Will follow with patient.     Ifeoma Romero NP  Neurology  [facility] - [facility] ICU  I have seen/examined the patient.  NP was scribe.  I agree with the findings unless outlined  below:    Adam N Foreman, MD [facility]

## 2024-09-19 NOTE — SUBJECTIVE & OBJECTIVE
No past medical history on file.    No past surgical history on file.    Review of patient's allergies indicates:  No Known Allergies    Current Neurological Medications: Keppra    No current facility-administered medications on file prior to encounter.     Current Outpatient Medications on File Prior to Encounter   Medication Sig    clopidogreL (PLAVIX) 75 mg tablet Take 75 mg by mouth once daily.    EScitalopram oxalate (LEXAPRO) 5 MG Tab Take 5 mg by mouth once daily.    HYDROcodone-acetaminophen (NORCO)  mg per tablet Take 1 tablet by mouth every 6 (six) hours as needed for Pain.    LORazepam (ATIVAN) 0.5 MG tablet Take 0.5 mg by mouth every 12 (twelve) hours as needed for Anxiety.    memantine (NAMENDA) 10 MG Tab Take 10 mg by mouth 2 (two) times daily.    tamsulosin (FLOMAX) 0.4 mg Cap Take 1 capsule by mouth once daily.    diltiaZEM (CARDIZEM) 120 MG tablet Take 1 tablet by mouth once daily.    fluticasone propionate (FLONASE) 50 mcg/actuation nasal spray 1 spray by Each Nostril route once daily.    ipratropium (ATROVENT) 42 mcg (0.06 %) nasal spray 2 sprays by Each Nostril route 3 (three) times daily.    XARELTO 20 mg Tab Take 20 mg by mouth once daily.    [DISCONTINUED] LORazepam (ATIVAN) 0.5 MG tablet Take 0.5 mg by mouth every 12 (twelve) hours as needed for Anxiety.     Family History    None       Tobacco Use    Smoking status: Not on file    Smokeless tobacco: Not on file   Substance and Sexual Activity    Alcohol use: Not on file    Drug use: Not on file    Sexual activity: Not on file     Review of Systems   Unable to perform ROS: Intubated     Objective:     Vital Signs (Most Recent):  Temp: 98.4 °F (36.9 °C) (09/19/24 0800)  Pulse: 70 (09/19/24 0845)  Resp: 18 (09/19/24 0845)  BP: 130/76 (09/19/24 0830)  SpO2: (!) 93 % (09/19/24 0845) Vital Signs (24h Range):  Temp:  [98.1 °F (36.7 °C)-99.1 °F (37.3 °C)] 98.4 °F (36.9 °C)  Pulse:  [60-83] 70  Resp:  [16-36] 18  SpO2:  [93 %-100 %] 93 %  BP:  ()/(66-94) 130/76     Weight: 87.9 kg (193 lb 12.6 oz)  Body mass index is 29.46 kg/m².     Physical Exam  Vitals reviewed.   Constitutional:       Appearance: Normal appearance.   HENT:      Head: Normocephalic and atraumatic.      Nose: Nose normal.      Mouth/Throat:      Comments: ETT    Eyes:      Extraocular Movements: Extraocular movements intact.      Pupils: Pupils are equal, round, and reactive to light.   Pulmonary:      Effort: Pulmonary effort is normal.   Abdominal:      Palpations: Abdomen is soft.   Musculoskeletal:         General: Normal range of motion.      Cervical back: Normal range of motion.   Skin:     General: Skin is warm and dry.      Capillary Refill: Capillary refill takes less than 2 seconds.   Neurological:      General: No focal deficit present.      Mental Status: He is alert and oriented to person, place, and time.      Comments: Following commands  Pupils, equal round and reactive  Antigravity in all 4's  RUE weaker than LUE  Sensation intact bilaterally  Face symmetric bilaterally   Psychiatric:         Mood and Affect: Mood normal.         Behavior: Behavior normal.          NEUROLOGICAL EXAMINATION:     MENTAL STATUS   Oriented to person, place, and time.     CRANIAL NERVES     CN III, IV, VI   Pupils are equal, round, and reactive to light.      Significant Labs: CBC:   Recent Labs   Lab 09/19/24  0035 09/19/24  0102   WBC  --  12.25*   HGB  --  12.0*   HCT 34* 34.7*   PLT  --  191     CMP:   Recent Labs   Lab 09/19/24  0102      K 3.5   *   CO2 23   BUN 10.7   CREATININE 0.78   CALCIUM 7.4*   ALBUMIN 2.4*   BILITOT 0.4   ALKPHOS 59   AST 13   ALT 11       Significant Imaging: I have reviewed all pertinent imaging results/findings within the past 24 hours.

## 2024-09-19 NOTE — ASSESSMENT & PLAN NOTE
- presented with AMS and brief episode of unresponsiveness, followed by witness seizure activity by EMS  - Stroke RF: HTN and HLD  - Intervention: no tnk, symptoms improved, felt to be stroke mimic 2/2 witness seizure  - Etiology: TBD, Seizure vs. CVA,     Stroke workup:  -CTh: No acute intracranial abnormality identified. Findings of chronic microvascular ischemic disease.   -CTA h/n: 1. No large vessel occlusion or flow-limiting stenosis.  2. 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  3. Atherosclerotic changes at the carotid bulbs with 20-30% stenosis on the right and 30-40% stenosis on the left by NASCET criteria.  4. Bulky mediastinal lymphadenopathy with right lung collapse and pleural effusion.   -MRI brain:    -ECHO:    -CUS:   -LDL: 84   -TSH: 0.582   -home medications include: Plavix and Xarelto      Plan:  Continue stroke workup. ECHO with BS pending.  Continue Keppra per ICU, EEG ordered.   MRI Brain w/wo to rule metastatic disease/CVA  Allow permissive HTN ... prn hydralazine and labetalol for SBP > 220 or DBP > 120     - after 24 hours from symptom onset, ok to normalize blood pressure  Neuro checks q4hr ... stat CTh if any neuro change   Hold AP/AC   Continuous telemetry monitoring  PT/OT/SLP to evaluate when able to participate    Further recommendations per MD.

## 2024-09-19 NOTE — H&P
[facility] - Emergency Dept  Pulmonary Critical Care Note    Patient Name: Gerald J Lejeune  MRN: [id number]  Admission Date: 9/19/2024  Hospital Length of Stay: 0 days  Code Status: Full Code  Attending Provider: BEL Jerome MD  Primary Care Provider: Ashley, Primary Doctor     Subjective:     HPI:   Gerald J Lejeune is a 79 y.o. male with a pmh of HTN, HLD, PAD hx stent 2019, COPD, and former tobacco use, who presented to [facility] ED as a transfer from [place] on 9/19/2024 for stroke like symptoms. Patient's daughter at bedside to provide additional history of events. Patient's girlfriend noticed the patient staring off into the distance with rightward gaze at approx 8:15 on 9/18/2024. Patient was reportedly unresponsive for a short time following by confused. EMS was called and patient had a seizure en route to hospital in [place] which was treated with versed. Upon arrival to [place] ED, patent was intubated for GCS of 3. Outside hospital did not have CT Scanner in operation, so patient was transferred to [facility]. Upon arrival to [facility], ED physician reports patient was intubated, awake, following commands in all extremities (hx of R BKA), and negative for gaze palsy or neglect. CT Head and CTA H&N were negative. Following discussion with Neuro, decision was made to defer TNK as patient was outside of window after midnight and exam/imaging were not consistent with stroke. ICU consulted for admission as patient remains intubated. No additional seizure like activity observed. Family denies a history of seizure disorder.     Patient's daughter was unclear about past medical history but provided a medication list.    Hospital Course/Significant events:  9/19/2024: Admitted to ICU intubated, sedation    24 Hour Interval History:      No past medical history on file.    No past surgical history on file.    Social History     Socioeconomic History    Marital status:            Current Outpatient  Medications   Medication Instructions    clopidogreL (PLAVIX) 75 mg, Oral, Daily    diltiaZEM (CARDIZEM) 120 MG tablet 1 tablet, Oral, Daily    EScitalopram oxalate (LEXAPRO) 5 mg, Oral, Daily    fluticasone propionate (FLONASE) 50 mcg/actuation nasal spray 1 spray, Each Nostril, Daily    HYDROcodone-acetaminophen (NORCO)  mg per tablet 1 tablet, Oral, Every 6 hours PRN    ipratropium (ATROVENT) 42 mcg (0.06 %) nasal spray 2 sprays, Each Nostril, 3 times daily    LORazepam (ATIVAN) 0.5 mg, Oral, Every 12 hours PRN    memantine (NAMENDA) 10 mg, Oral, 2 times daily    tamsulosin (FLOMAX) 0.4 mg Cap 1 capsule, Oral, Daily    XARELTO 20 mg, Oral, Daily       Current Inpatient Medications   chlorhexidine  15 mL Mouth/Throat BID    levETIRAcetam (Keppra) IV (PEDS and ADULTS)  500 mg Intravenous Q12H    [START ON 9/21/2024] mupirocin   Nasal BID    pantoprazole  40 mg Intravenous Daily       Current Intravenous Infusions   0.9% NaCl   Intravenous Code/Trauma/sedation Continuous Med 30 mL/hr at 09/19/24 0039 30 mL/hr at 09/19/24 0039    propofoL  0-50 mcg/kg/min (Dosing Weight) Intravenous Continuous 13.2 mL/hr at 09/19/24 0111 25 mcg/kg/min at 09/19/24 0111         Review of Systems   Unable to perform ROS: Intubated          Objective:     No intake or output data in the 24 hours ending 09/19/24 0209      Vital Signs (Most Recent):  Temp: 98.1 °F (36.7 °C) (09/19/24 0100)  Pulse: 75 (09/19/24 0141)  Resp: 16 (09/19/24 0141)  BP: 116/74 (09/19/24 0141)  SpO2: 95 % (09/19/24 0141)  Body mass index is 27.81 kg/m².  Weight: 87.9 kg (193 lb 12.6 oz) Vital Signs (24h Range):  Temp:  [98.1 °F (36.7 °C)] 98.1 °F (36.7 °C)  Pulse:  [75-83] 75  Resp:  [16-22] 16  SpO2:  [95 %-100 %] 95 %  BP: ()/(66-94) 116/74     Physical Exam  Constitutional:       General: He is not in acute distress.     Appearance: He is not diaphoretic.   HENT:      Head: Normocephalic and atraumatic.   Eyes:      General: No scleral icterus.      "Pupils: Pupils are equal, round, and reactive to light.   Cardiovascular:      Rate and Rhythm: Normal rate.   Pulmonary:      Effort: No respiratory distress.      Breath sounds: Normal breath sounds. No wheezing.      Comments: Intubated, mechanically ventilated  Abdominal:      General: There is no distension.      Palpations: Abdomen is soft.   Musculoskeletal:      Right lower leg: No edema.      Left lower leg: No edema.      Comments: Healed R BKA (prosthesis as bedside)   Skin:     Comments: Large left clavicular mass (family denies hx of ICD or pacemaker)   Neurological:      Comments: Unable to fully assess as patient is sedated, withdraws in all extremities           Lines/Drains/Airways       Drain  Duration                  NG/OG Tube 09/19/24 0050 orogastric 16 Fr. Right mouth <1 day              Airway  Duration                  Airway - Non-Surgical -- days              Peripheral Intravenous Line  Duration                  Peripheral IV - Single Lumen 09/19/24 0000 18 G Left Antecubital <1 day         Peripheral IV - Single Lumen 09/19/24 0000 20 G Posterior;Right Hand <1 day         Peripheral IV - Single Lumen 09/19/24 0042 20 G Right Antecubital <1 day                    Significant Labs:    Lab Results   Component Value Date    WBC 12.25 (H) 09/19/2024    HGB 12.0 (L) 09/19/2024    HCT 34.7 (L) 09/19/2024    MCV 89.4 09/19/2024     09/19/2024           BMP  Lab Results   Component Value Date     09/19/2024    K 3.5 09/19/2024    CO2 23 09/19/2024    BUN 10.7 09/19/2024    CREATININE 0.78 09/19/2024    CALCIUM 7.4 (L) 09/19/2024    AGAP 9.0 09/19/2024         ABG  No results for input(s): "PH", "PO2", "PCO2", "HCO3", "POCBASEDEF" in the last 168 hours.    Mechanical Ventilation Support:  Vent Mode: A/C (09/19/24 0029)  Ventilator Initiated: Yes (09/19/24 0029)  Set Rate: 18 BPM (09/19/24 0029)  Vt Set: 500 mL (09/19/24 0029)  PEEP/CPAP: 5 cmH20 (09/19/24 0029)  Oxygen Concentration " (%): 40 (09/19/24 0141)  Peak Airway Pressure: 23 cmH20 (09/19/24 0029)  Total Ve: 10.2 L/m (09/19/24 0029)      Significant Imaging:  I have reviewed the pertinent imaging within the past 24 hours.    CT Head 9/19/2024 0036  Impression:   1. No acute intracranial process identified. Details and other findings as noted above.     CTA H&N 9/19/2024 0052  Impression:   1. Mild atheromatous calcification of the proximal cavernous clinoid and supraclinoid segments of the bilateral internal carotid arteries is seen with mild stenosis.   2. Mild atheromatous calcification with mild stenosis at the origin of the left internal carotid artery is seen.   3. There is ectasia of the anterior communicating artery measuring 3 mm in widest diameter.   4. There is a left pleural effusion with consolidated collapse of the right upper lobe partially imaged. An endotracheal tube is in place.   5. Details and other findings as described above.       Assessment/Plan:     Assessment  Seizure  Leukocytosis - possibly reactive   Normocytic anemia  Hx of HTN, HLD, PAD on Plavix and Xarelto      Plan  Admitted to ICU, intubated and sedated  Mechanical ventilation per ARDS net protocol  XR Chest to assess lines, STAT ABG  SAT/SBT in the morning  Keppra 500 mg IV BID  Ativan prn for seizure  EEG if patient has seizure like activity   Follow up UDS    DVT Prophylaxis: SCDs  GI Prophylaxis: PPI     32 minutes of critical care was time spent personally by me on the following activities: development of treatment plan with patient or surrogate and bedside caregivers, discussions with consultants, evaluation of patient's response to treatment, examination of patient, ordering and performing treatments and interventions, ordering and review of laboratory studies, ordering and review of radiographic studies, pulse oximetry, re-evaluation of patient's condition.  This critical care time did not overlap with that of any other provider or involve time  for any procedures.     Zoraida Toribio MD  Pulmonary Critical Care Medicine  [facility] - Emergency Dept  DOS: 09/19/2024

## 2024-09-19 NOTE — PROCEDURES
EEG    Dx: Stroke, seizure    Duration: 26 min    Technical: Standard digital EEG was performed at [facility]. The 10/20 international system of electrode placement was used.  Photic   stimulation and hyperventilation were not performed as activating   procedures.    Description: The posterior dominant rhythm was 3-10Hz.  There   were no lateralizing features.  The patient was sedated on propofol.  There were   no epileptiform discharges or clinical seizures seen.    Impression: Sedation effect. No seizure.      Scheduled Meds:   chlorhexidine  15 mL Mouth/Throat BID    EScitalopram oxalate  5 mg Oral Daily    levETIRAcetam (Keppra) IV (PEDS and ADULTS)  500 mg Intravenous Q12H    [START ON 9/21/2024] mupirocin   Nasal BID    pantoprazole  40 mg Intravenous Daily     Continuous Infusions:   0.9% NaCl   Intravenous Code/Trauma/sedation Continuous Med 30 mL/hr at 09/19/24 1207 Rate Verify at 09/19/24 1207    propofoL  0-50 mcg/kg/min (Dosing Weight) Intravenous Continuous 15.8 mL/hr at 09/19/24 1326 30 mcg/kg/min at 09/19/24 1326     PRN Meds:.  Current Facility-Administered Medications:     0.9% NaCl, , Intravenous, Code/Trauma/sedation Continuous Med    lorazepam, 2 mg, Intravenous, Q15 Min PRN    sodium chloride 0.9%, 10 mL, Intravenous, PRN

## 2024-09-19 NOTE — NURSING
Nurses Note -- 4 Eyes      9/19/2024   10:11 AM      Skin assessed during: Daily Assessment      [] No Altered Skin Integrity Present    [x]Prevention Measures Documented      [x] Yes- Altered Skin Integrity Present or Discovered   [x] LDA Added if Not in Epic (Describe Wound)   [] New Altered Skin Integrity was Present on Admit and Documented in LDA   [] Wound Image Taken    Wound Care Consulted? No    Attending Nurse:  Carter Gifford RN/Staff Member:   JUNG Singh

## 2024-09-20 PROBLEM — R41.82 AMS (ALTERED MENTAL STATUS): Status: RESOLVED | Noted: 2024-09-19 | Resolved: 2024-09-20

## 2024-09-20 LAB
ALBUMIN SERPL-MCNC: 2.6 G/DL (ref 3.4–4.8)
ALBUMIN/GLOB SERPL: 0.9 RATIO (ref 1.1–2)
ALLENS TEST BLOOD GAS (OHS): YES
ALP SERPL-CCNC: 73 UNIT/L (ref 40–150)
ALT SERPL-CCNC: 10 UNIT/L (ref 0–55)
ANION GAP SERPL CALC-SCNC: 11 MEQ/L
AST SERPL-CCNC: 14 UNIT/L (ref 5–34)
BASE EXCESS BLD CALC-SCNC: 7.4 MMOL/L (ref -2–2)
BASOPHILS # BLD AUTO: 0.03 X10(3)/MCL
BASOPHILS NFR BLD AUTO: 0.3 %
BILIRUB SERPL-MCNC: 0.4 MG/DL
BLOOD GAS SAMPLE TYPE (OHS): ABNORMAL
BUN SERPL-MCNC: 11.1 MG/DL (ref 8.4–25.7)
CA-I BLD-SCNC: 1.19 MMOL/L (ref 1.12–1.23)
CALCIUM SERPL-MCNC: 8.7 MG/DL (ref 8.8–10)
CHLORIDE SERPL-SCNC: 107 MMOL/L (ref 98–107)
CO2 BLDA-SCNC: 32.4 MMOL/L
CO2 SERPL-SCNC: 25 MMOL/L (ref 23–31)
CREAT SERPL-MCNC: 0.77 MG/DL (ref 0.73–1.18)
CREAT/UREA NIT SERPL: 14
DRAWN BY BLOOD GAS (OHS): ABNORMAL
EOSINOPHIL # BLD AUTO: 0.53 X10(3)/MCL (ref 0–0.9)
EOSINOPHIL NFR BLD AUTO: 5.6 %
ERYTHROCYTE [DISTWIDTH] IN BLOOD BY AUTOMATED COUNT: 12.6 % (ref 11.5–17)
GFR SERPLBLD CREATININE-BSD FMLA CKD-EPI: >60 ML/MIN/1.73/M2
GLOBULIN SER-MCNC: 2.8 GM/DL (ref 2.4–3.5)
GLUCOSE SERPL-MCNC: 89 MG/DL (ref 82–115)
HCO3 BLDA-SCNC: 31.2 MMOL/L (ref 22–26)
HCT VFR BLD AUTO: 37.6 % (ref 42–52)
HGB BLD-MCNC: 12.9 G/DL (ref 14–18)
IMM GRANULOCYTES # BLD AUTO: 0.04 X10(3)/MCL (ref 0–0.04)
IMM GRANULOCYTES NFR BLD AUTO: 0.4 %
INHALED O2 CONCENTRATION: 35 %
LYMPHOCYTES # BLD AUTO: 1.08 X10(3)/MCL (ref 0.6–4.6)
LYMPHOCYTES NFR BLD AUTO: 11.5 %
MAGNESIUM SERPL-MCNC: 2.2 MG/DL (ref 1.6–2.6)
MCH RBC QN AUTO: 30.8 PG (ref 27–31)
MCHC RBC AUTO-ENTMCNC: 34.3 G/DL (ref 33–36)
MCV RBC AUTO: 89.7 FL (ref 80–94)
MECH RR (OHS): 18 B/MIN
MODE (OHS): AC
MONOCYTES # BLD AUTO: 0.81 X10(3)/MCL (ref 0.1–1.3)
MONOCYTES NFR BLD AUTO: 8.6 %
NEUTROPHILS # BLD AUTO: 6.91 X10(3)/MCL (ref 2.1–9.2)
NEUTROPHILS NFR BLD AUTO: 73.6 %
NRBC BLD AUTO-RTO: 0 %
OXYGEN DEVICE BLOOD GAS (OHS): ABNORMAL
PCO2 BLDA: 40 MMHG (ref 35–45)
PEEP RESPIRATORY: 5 CMH2O
PH BLDA: 7.5 [PH] (ref 7.35–7.45)
PHOSPHATE SERPL-MCNC: 3.5 MG/DL (ref 2.3–4.7)
PLATELET # BLD AUTO: 217 X10(3)/MCL (ref 130–400)
PMV BLD AUTO: 10.8 FL (ref 7.4–10.4)
PO2 BLDA: 64 MMHG (ref 80–100)
POTASSIUM BLOOD GAS (OHS): 3.3 MMOL/L (ref 3.5–5)
POTASSIUM SERPL-SCNC: 3.8 MMOL/L (ref 3.5–5.1)
PROT SERPL-MCNC: 5.4 GM/DL (ref 5.8–7.6)
RBC # BLD AUTO: 4.19 X10(6)/MCL (ref 4.7–6.1)
SAMPLE SITE BLOOD GAS (OHS): ABNORMAL
SAO2 % BLDA: 94 %
SODIUM BLOOD GAS (OHS): 140 MMOL/L (ref 137–145)
SODIUM SERPL-SCNC: 143 MMOL/L (ref 136–145)
SPONT+MECH VT ON VENT: 500 ML
WBC # BLD AUTO: 9.4 X10(3)/MCL (ref 4.5–11.5)

## 2024-09-20 PROCEDURE — 94003 VENT MGMT INPAT SUBQ DAY: CPT

## 2024-09-20 PROCEDURE — 84100 ASSAY OF PHOSPHORUS: CPT | Performed by: STUDENT IN AN ORGANIZED HEALTH CARE EDUCATION/TRAINING PROGRAM

## 2024-09-20 PROCEDURE — 99900031 HC PATIENT EDUCATION (STAT)

## 2024-09-20 PROCEDURE — 27100171 HC OXYGEN HIGH FLOW UP TO 24 HOURS

## 2024-09-20 PROCEDURE — 99900026 HC AIRWAY MAINTENANCE (STAT)

## 2024-09-20 PROCEDURE — 85025 COMPLETE CBC W/AUTO DIFF WBC: CPT | Performed by: STUDENT IN AN ORGANIZED HEALTH CARE EDUCATION/TRAINING PROGRAM

## 2024-09-20 PROCEDURE — 11000001 HC ACUTE MED/SURG PRIVATE ROOM

## 2024-09-20 PROCEDURE — 94760 N-INVAS EAR/PLS OXIMETRY 1: CPT

## 2024-09-20 PROCEDURE — 25000003 PHARM REV CODE 250: Performed by: INTERNAL MEDICINE

## 2024-09-20 PROCEDURE — 83735 ASSAY OF MAGNESIUM: CPT | Performed by: STUDENT IN AN ORGANIZED HEALTH CARE EDUCATION/TRAINING PROGRAM

## 2024-09-20 PROCEDURE — 25000003 PHARM REV CODE 250: Performed by: STUDENT IN AN ORGANIZED HEALTH CARE EDUCATION/TRAINING PROGRAM

## 2024-09-20 PROCEDURE — 99900035 HC TECH TIME PER 15 MIN (STAT)

## 2024-09-20 PROCEDURE — 36415 COLL VENOUS BLD VENIPUNCTURE: CPT | Performed by: STUDENT IN AN ORGANIZED HEALTH CARE EDUCATION/TRAINING PROGRAM

## 2024-09-20 PROCEDURE — 80053 COMPREHEN METABOLIC PANEL: CPT | Performed by: STUDENT IN AN ORGANIZED HEALTH CARE EDUCATION/TRAINING PROGRAM

## 2024-09-20 PROCEDURE — 63600175 PHARM REV CODE 636 W HCPCS: Performed by: STUDENT IN AN ORGANIZED HEALTH CARE EDUCATION/TRAINING PROGRAM

## 2024-09-20 PROCEDURE — 36600 WITHDRAWAL OF ARTERIAL BLOOD: CPT

## 2024-09-20 PROCEDURE — 99232 SBSQ HOSP IP/OBS MODERATE 35: CPT | Mod: ,,, | Performed by: PSYCHIATRY & NEUROLOGY

## 2024-09-20 PROCEDURE — 82803 BLOOD GASES ANY COMBINATION: CPT

## 2024-09-20 PROCEDURE — 94761 N-INVAS EAR/PLS OXIMETRY MLT: CPT | Mod: XB

## 2024-09-20 RX ORDER — CLOPIDOGREL BISULFATE 75 MG/1
75 TABLET ORAL DAILY
Status: DISCONTINUED | OUTPATIENT
Start: 2024-09-21 | End: 2024-09-20

## 2024-09-20 RX ORDER — TAMSULOSIN HYDROCHLORIDE 0.4 MG/1
1 CAPSULE ORAL DAILY
Status: DISCONTINUED | OUTPATIENT
Start: 2024-09-21 | End: 2024-09-24 | Stop reason: HOSPADM

## 2024-09-20 RX ORDER — MEMANTINE HYDROCHLORIDE 5 MG/1
10 TABLET ORAL 2 TIMES DAILY
Status: DISCONTINUED | OUTPATIENT
Start: 2024-09-20 | End: 2024-09-24 | Stop reason: HOSPADM

## 2024-09-20 RX ORDER — ENOXAPARIN SODIUM 100 MG/ML
40 INJECTION SUBCUTANEOUS EVERY 24 HOURS
Status: DISCONTINUED | OUTPATIENT
Start: 2024-09-21 | End: 2024-09-24

## 2024-09-20 RX ORDER — LORAZEPAM 0.5 MG/1
0.5 TABLET ORAL EVERY 12 HOURS PRN
Status: DISCONTINUED | OUTPATIENT
Start: 2024-09-20 | End: 2024-09-24 | Stop reason: HOSPADM

## 2024-09-20 RX ORDER — HYDROCODONE BITARTRATE AND ACETAMINOPHEN 10; 325 MG/1; MG/1
1 TABLET ORAL EVERY 6 HOURS PRN
Status: DISCONTINUED | OUTPATIENT
Start: 2024-09-20 | End: 2024-09-24 | Stop reason: HOSPADM

## 2024-09-20 RX ORDER — PANTOPRAZOLE SODIUM 40 MG/1
40 TABLET, DELAYED RELEASE ORAL
Status: DISCONTINUED | OUTPATIENT
Start: 2024-09-20 | End: 2024-09-24 | Stop reason: HOSPADM

## 2024-09-20 RX ORDER — DILTIAZEM HYDROCHLORIDE 60 MG/1
60 TABLET, FILM COATED ORAL EVERY 12 HOURS
Status: DISCONTINUED | OUTPATIENT
Start: 2024-09-20 | End: 2024-09-24 | Stop reason: HOSPADM

## 2024-09-20 RX ADMIN — MEMANTINE 10 MG: 5 TABLET ORAL at 09:09

## 2024-09-20 RX ADMIN — CHLORHEXIDINE GLUCONATE 0.12% ORAL RINSE 15 ML: 1.2 LIQUID ORAL at 08:09

## 2024-09-20 RX ADMIN — ESCITALOPRAM OXALATE 5 MG: 5 TABLET, FILM COATED ORAL at 08:09

## 2024-09-20 RX ADMIN — DILTIAZEM HYDROCHLORIDE 60 MG: 60 TABLET, FILM COATED ORAL at 08:09

## 2024-09-20 RX ADMIN — PROPOFOL 25 MCG/KG/MIN: 10 INJECTION, EMULSION INTRAVENOUS at 01:09

## 2024-09-20 RX ADMIN — PANTOPRAZOLE SODIUM 40 MG: 40 INJECTION, POWDER, LYOPHILIZED, FOR SOLUTION INTRAVENOUS at 08:09

## 2024-09-20 RX ADMIN — LEVETIRACETAM 500 MG: 100 INJECTION, SOLUTION INTRAVENOUS at 08:09

## 2024-09-20 RX ADMIN — PROPOFOL 35 MCG/KG/MIN: 10 INJECTION, EMULSION INTRAVENOUS at 08:09

## 2024-09-20 RX ADMIN — PANTOPRAZOLE SODIUM 40 MG: 40 TABLET, DELAYED RELEASE ORAL at 08:09

## 2024-09-20 NOTE — ASSESSMENT & PLAN NOTE
Continue keppra 500 mg BID   Give ativan 2 mg PRN for witnessed seizures  Seizure precautions  Further recommendations per MD

## 2024-09-20 NOTE — PROGRESS NOTES
[facility] - [facility] ICU  Pulmonary Critical Care Note    Patient Name: Gerald J Lejeune  MRN: [id number]  Admission Date: 9/19/2024  Hospital Length of Stay: 1 days  Code Status: Full Code  Attending Provider: BEL Jerome MD  Primary Care Provider: Shlomo Leach MD     Subjective:     HPI: Gerald J Lejeune is a 79 y.o. male with a pmh of HTN, HLD, PAD hx stent 2019, COPD, and former tobacco use, who presented to [facility] ED as a transfer from [place] on 9/19/2024 for stroke like symptoms. Patient's daughter at bedside to provide additional history of events. Patient's girlfriend noticed the patient staring off into the distance with rightward gaze at approx 8:15 on 9/18/2024. Patient was reportedly unresponsive for a short time following by confused. EMS was called and patient had a seizure en route to hospital in [place] which was treated with versed. Upon arrival to [place] ED, patent was intubated for GCS of 3. Outside hospital did not have CT Scanner in operation, so patient was transferred to [facility]. Upon arrival to [facility], ED physician reports patient was intubated, awake, following commands in all extremities (hx of R BKA), and negative for gaze palsy or neglect. CT Head and CTA H&N were negative. Following discussion with Neuro, decision was made to defer TNK as patient was outside of window after midnight and exam/imaging were not consistent with stroke. ICU consulted for admission as patient remains intubated. No additional seizure like activity observed. Family denies a history of seizure disorder.       Hospital Course/Significant events:      24 Hour Interval History:  NAEON. Vitals stable. Tolerated SBT this a.m. Extubated today. Awake, alert, oriented post extubation. Currently on nasal cannula. Denies any acute complaints. Does not remember seizure or mayito-seizure events but long term memory intact. Denies any acute complaints. Plan to downgrade to floor today.    No past medical  history on file.    No past surgical history on file.    Social History     Socioeconomic History    Marital status:      Social Determinants of Health     Financial Resource Strain: Patient Unable To Answer (9/20/2024)    Overall Financial Resource Strain (CARDIA)     Difficulty of Paying Living Expenses: Patient unable to answer   Food Insecurity: Patient Unable To Answer (9/20/2024)    Hunger Vital Sign     Worried About Running Out of Food in the Last Year: Patient unable to answer     Ran Out of Food in the Last Year: Patient unable to answer   Transportation Needs: Patient Unable To Answer (9/20/2024)    TRANSPORTATION NEEDS     Transportation : Patient unable to answer   Stress: Patient Unable To Answer (9/20/2024)    [employer] - Occupational Stress Questionnaire     Feeling of Stress : Patient unable to answer   Housing Stability: Patient Unable To Answer (9/20/2024)    Housing Stability Vital Sign     Unable to Pay for Housing in the Last Year: Patient unable to answer     Homeless in the Last Year: Patient unable to answer           Current Outpatient Medications   Medication Instructions    clopidogreL (PLAVIX) 75 mg, Oral, Daily    diltiaZEM (CARDIZEM) 120 MG tablet 1 tablet, Oral, Daily    EScitalopram oxalate (LEXAPRO) 5 mg, Oral, Daily    fluticasone propionate (FLONASE) 50 mcg/actuation nasal spray 1 spray, Each Nostril, Daily    HYDROcodone-acetaminophen (NORCO)  mg per tablet 1 tablet, Oral, Every 6 hours PRN    ipratropium (ATROVENT) 42 mcg (0.06 %) nasal spray 2 sprays, Each Nostril, 3 times daily    LORazepam (ATIVAN) 0.5 mg, Oral, Every 12 hours PRN    memantine (NAMENDA) 10 mg, Oral, 2 times daily    tamsulosin (FLOMAX) 0.4 mg Cap 1 capsule, Oral, Daily    XARELTO 20 mg, Oral, Daily       Current Inpatient Medications   chlorhexidine  15 mL Mouth/Throat BID    EScitalopram oxalate  5 mg Oral Daily    levETIRAcetam (Keppra) IV (PEDS and ADULTS)  500  mg Intravenous Q12H    [START ON 9/21/2024] mupirocin   Nasal BID    pantoprazole  40 mg Intravenous Daily       Current Intravenous Infusions   0.9% NaCl   Intravenous Code/Trauma/sedation Continuous Med   Stopped at 09/19/24 2014    propofoL  0-50 mcg/kg/min (Dosing Weight) Intravenous Continuous 18.5 mL/hr at 09/20/24 0805 35 mcg/kg/min at 09/20/24 0805         Review of Systems   Constitutional:  Negative for chills, diaphoresis, fever and malaise/fatigue.   HENT:  Negative for ear pain, sore throat and tinnitus.    Eyes:  Negative for pain, discharge and redness.   Respiratory:  Negative for cough, hemoptysis, sputum production, shortness of breath and wheezing.    Cardiovascular:  Negative for chest pain, orthopnea and leg swelling.   Gastrointestinal:  Negative for abdominal pain, blood in stool, constipation, diarrhea, melena, nausea and vomiting.   Genitourinary:  Negative for dysuria, flank pain, frequency, hematuria and urgency.   Musculoskeletal:  Negative for joint pain and myalgias.   Neurological:  Negative for headaches.          Objective:       Intake/Output Summary (Last 24 hours) at 9/20/2024 1336  Last data filed at 9/20/2024 1200  Gross per 24 hour   Intake 680.98 ml   Output 1375 ml   Net -694.02 ml         Vital Signs (Most Recent):  Temp: 98.6 °F (37 °C) (09/20/24 1200)  Pulse: 78 (09/20/24 1200)  Resp: (!) 21 (09/20/24 1200)  BP: 138/69 (09/20/24 1200)  SpO2: 95 % (09/20/24 1200)  Body mass index is 29.46 kg/m².  Weight: 87.9 kg (193 lb 12.6 oz) Vital Signs (24h Range):  Temp:  [98.1 °F (36.7 °C)-98.6 °F (37 °C)] 98.6 °F (37 °C)  Pulse:  [58-91] 78  Resp:  [0-35] 21  SpO2:  [78 %-99 %] 95 %  BP: ()/() 138/69     Physical Exam  Constitutional:       General: He is not in acute distress.     Appearance: Normal appearance. He is obese. He is not ill-appearing.   HENT:      Head: Normocephalic and atraumatic.   Eyes:      General: No scleral icterus.        Right eye: No discharge.          Left eye: No discharge.      Extraocular Movements: Extraocular movements intact.      Conjunctiva/sclera: Conjunctivae normal.      Pupils: Pupils are equal, round, and reactive to light.   Cardiovascular:      Rate and Rhythm: Normal rate and regular rhythm.      Pulses: Normal pulses.      Heart sounds: Normal heart sounds. No murmur heard.     No friction rub. No gallop.   Pulmonary:      Effort: Pulmonary effort is normal. No respiratory distress.      Breath sounds: Normal breath sounds. No stridor. No wheezing, rhonchi or rales.   Abdominal:      General: Bowel sounds are normal. There is no distension.      Palpations: Abdomen is soft.      Tenderness: There is no abdominal tenderness. There is no guarding.   Musculoskeletal:         General: No swelling. Normal range of motion.      Right lower leg: No edema.      Left lower leg: No edema.   Neurological:      Mental Status: He is alert.      Comments: AAOx4; CN II-XII grossly intact           Lines/Drains/Airways       Drain  Duration                  Urethral Catheter 09/19/24 2145 16 Fr. <1 day              Peripheral Intravenous Line  Duration                  Peripheral IV - Single Lumen 09/19/24 0000 18 G Left Antecubital 1 day         Peripheral IV - Single Lumen 09/19/24 0000 20 G Posterior;Right Hand 1 day         Peripheral IV - Single Lumen 09/19/24 0042 20 G Right Antecubital 1 day                    Significant Labs:    Lab Results   Component Value Date    WBC 9.40 09/20/2024    HGB 12.9 (L) 09/20/2024    HCT 37.6 (L) 09/20/2024    MCV 89.7 09/20/2024     09/20/2024           BMP  Lab Results   Component Value Date     09/20/2024    K 3.8 09/20/2024    CO2 25 09/20/2024    BUN 11.1 09/20/2024    CREATININE 0.77 09/20/2024    CALCIUM 8.7 (L) 09/20/2024    AGAP 11.0 09/20/2024         ABG  Recent Labs   Lab 09/20/24  0500   PH 7.500*   PO2 64.0*   PCO2 40.0   HCO3 31.2*   POCBASEDEF 7.40*       Mechanical Ventilation  Support:  Vent Mode: CPAP / PSV (09/20/24 0820)  Ventilator Initiated: Yes (09/19/24 0029)  Set Rate: 18 BPM (09/20/24 0529)  Vt Set: 500 mL (09/20/24 0529)  Pressure Support: 10 cmH20 (09/20/24 0820)  PEEP/CPAP: 5 cmH20 (09/20/24 0820)  Oxygen Concentration (%): 4 (09/20/24 1200)  Peak Airway Pressure: 16 cmH20 (09/20/24 0820)  Total Ve: 9.4 L/m (09/20/24 0820)  F/VT Ratio<105 (RSBI): (!) 42.55 (09/20/24 0820)      Significant Imaging:  I have reviewed the pertinent imaging within the past 24 hours.        Assessment/Plan:     Assessment  Seizure  Leukocytosis, possibly reactive   Normocytic anemia  Hx of HTN, HLD, PAD on Plavix and Xarelto    Plan  Extubated this a.m.  Monitored in ICU post extubation whereby patient vitals remained stable and examination was assuring  Will plan to downgrade to floor today  Neurology following; remainder of care per neurology recs  Continue IV Keppra 500 mg IV BID  Ativan prn for seizure  EEG if patient has seizure like activity     DVT Prophylaxis: SCDs  GI Prophylaxis: Protonix     32 minutes of critical care was time spent personally by me on the following activities: development of treatment plan with patient or surrogate and bedside caregivers, discussions with consultants, evaluation of patient's response to treatment, examination of patient, ordering and performing treatments and interventions, ordering and review of laboratory studies, ordering and review of radiographic studies, pulse oximetry, re-evaluation of patient's condition.  This critical care time did not overlap with that of any other provider or involve time for any procedures.     Nilo Mack MD  Pulmonary Critical Care Medicine  [facility] - [facility] ICU  DOS: 09/20/2024

## 2024-09-20 NOTE — PLAN OF CARE
09/20/24 1044   Discharge Assessment   Assessment Type Discharge Planning Assessment   Confirmed/corrected address, phone number and insurance Yes   Confirmed Demographics Correct on Facesheet   Source of Information family;other (see comments)  (significant other)   When was your last doctors appointment? 09/04/24   Communicated PORSHA with patient/caregiver Date not available/Unable to determine   Reason For Admission Seizure   People in Home significant other   Facility Arrived From: [place]   Do you expect to return to your current living situation? Yes   Do you have help at home or someone to help you manage your care at home? Yes   Who are your caregiver(s) and their phone number(s)? significant otherHaven   Prior to hospitilization cognitive status: Not Oriented to Time  (dementia)   Current cognitive status: Unable to Assess   Walking or Climbing Stairs Difficulty yes   Walking or Climbing Stairs ambulation difficulty, requires equipment   Mobility Management cane   Dressing/Bathing Difficulty yes   Dressing/Bathing bathing difficulty, requires equipment;bathing difficulty, assistance 1 person   Dressing/Bathing Management shower chair and caregiver/sig other Haven   Home Accessibility wheelchair accessible   Equipment Currently Used at Home cane, straight;walker, rolling;shower chair;oxygen   Do you currently have service(s) that help you manage your care at home? No   Do you take prescription medications? Yes   Do you have prescription coverage? Yes   Coverage Phys[facility] [place]   Do you have any problems affording any of your prescribed medications? No   Is the patient taking medications as prescribed? yes   Who is going to help you get home at discharge? family   How do you get to doctors appointments? family or friend will provide   Are you on dialysis? No   Do you take coumadin? No   Discharge Plan A Skilled Nursing Facility   Discharge Plan B Other  (to be determined)   DME Needed Upon Discharge  other  (see comments)  (to be determined)   Discharge Plan discussed with: Spouse/sig other   Name(s) and Number(s) significant other, Haven at bedside   Transition of Care Barriers None   OTHER   Name(s) of People in Home Haven, significant other     Spoke to Haven, significant other and granddaughter at bedside. Haven lives with patient in his home. She is is caregiver. He has O2 at home with [employer]. He has a walker and a cane. Patient's PCP is Shlomo Leach. He does not have HH but may need this  upon d/c. Haven must assist patient with baths and he is unsteady with ambulation.

## 2024-09-20 NOTE — NURSING
Nurses Note -- 4 Eyes      9/20/2024   7:51 AM      Skin assessed during: Daily Assessment      [] No Altered Skin Integrity Present    [x]Prevention Measures Documented      [x] Yes- Altered Skin Integrity Present or Discovered   [x] LDA Added if Not in Epic (Describe Wound)   [] New Altered Skin Integrity was Present on Admit and Documented in LDA   [] Wound Image Taken    Wound Care Consulted? No    Attending Nurse:  Carter Gifford RN/Staff Member:   JUNG Machado

## 2024-09-20 NOTE — NURSING
Nurses Note -- 4 Eyes      9/20/2024   1:58 AM      Skin assessed during: Q Shift Change      [] No Altered Skin Integrity Present    []Prevention Measures Documented      [x] Yes- Altered Skin Integrity Present or Discovered   [] LDA Added if Not in Epic (Describe Wound)   [] New Altered Skin Integrity was Present on Admit and Documented in LDA   [] Wound Image Taken    Wound Care Consulted? No    Attending Nurse:  Chante Gifford RN/Staff Member:   Carter ATKINS RN

## 2024-09-20 NOTE — H&P
[facility] Hospital Medicine History & Physical Examination       Patient Name: Gerald J Lejeune  MRN: [id number]  Patient Class: IP- Inpatient   Admission Date: 09/20/2024   Admitting Service: Hospital Medicine   Length of Stay: 1  Attending Physician: Dr. Palacio  Primary Care Provider: Shlomo Leach MD  Face-to-Face encounter date: 09/20/2024  Code Status:   Chief Complaint: transfer (Pt transfer from [facility] on ventilator for stroke like symptoms. Last known normal was at 2015.  on arrival.)      Present at Bedside:  Patient information was obtained from patient, patient's family, past medical records and ER records.    MD addendum-  78 yo male with h/o former heavy smoker, COPD, Home O2 3 to 4 L/min, transferred from [place] for Neurology service for new onset seizures. Pt was intubated for airway protection in the ED and admitted to ICU on mechanical ventilation. Extubated today. Workup showed large right pleural effusion and extensive airspace consolidation on CXR. A CT chest was ordered showed almost complete consolidated collapse of Rt lung with mucous plugging versus endobronchial lesion. Additionally noted large mediastinal lymph nodes. Pulmonology was consulted to further evaluate this findings. An MRI brain was done to evaluate cause of new onset seizures showed no tumor and space occupying lesions. Home Plavix and Xarelto not resumed yet incase Pt needs invasive diagnostic procedure.           HISTORY OF PRESENT ILLNESS:   Gerald J Lejeune is a 79 y.o. male with a PMHx of essential hypertension, AAA, BPH, COPD, PAD/PVD, thrombocytopenia and right BKA who presented to [facility] on 9/19/2024 via EMS as a transfer from [facility] for higher level of care.  He initially presented to the outlying facility after family noticed a rightward gaze around 8:15 on 09/18/2024.  He was then unresponsive and confused after the event.  EMS were called and patient  reportedly had a seizure EN route to the [facility] ED.  Upon arrival to the [facility] ED he was intubated for airway protection.  CT scan was unavailable therefore patient was transferred to [facility] for higher level of care.    CT head negative for acute intracranial abnormality.  CXR suggestive of large right-sided pleural effusion and extensive airspace consolidation.  CTA head/neck negative for LVO or flow-limiting stenosis, fusiform dilatation of the anterior communicating artery without definite saccular  aneurysms; atherosclerotic changes at the carotid bulbs with 20-30% stenosis on the right 30 40% stenosis on the left; bulky mediastinal lymphadenopathy with right lung collapse and pleural effusion.  Labs were notable for WBC 12.25, hemoglobin 12, hematocrit 34.7, INR 1.8, chloride 108, calcium 7.4.  Hemoglobin A1c 5.  TSH normal.  UDS positive for benzodiazepines.  Neurology was consulted, thrombolytics not recommended, thrombectomy not recommended.  Started on IV Keppra.  He was admitted to ICU.  MRI brain with and without contrast on 09/19 negative for acute findings or metastatic evidence; chronic microangiopathic ischemia and atrophy noted.  EEG unremarkable.  Echocardiogram with EF 45-50%, grade 1 diastolic dysfunction.  Tolerated extubation on 09/20/2024.  Cleared for downgrade to the floor on 09/20/2024.  Hospital medicine consulted for assumption of care further medical management.    REVIEW OF SYSTEMS:   Except as documented, all other systems reviewed and negative.    PAST MEDICAL HISTORY:   Essential hypertension  AAA   BPH  COPD   PAD/PVD   Thrombocytopenia    PAST SURGICAL HISTORY:   Angioplasty   Bilateral lower extremity stents   Right BKA    FAMILY HISTORY:   Reviewed and negative    SOCIAL HISTORY:   Denied alcohol, tobacco or illicit drug use.     SCREENING FOR SOCIAL DRIVERS FOR HEALTH:   Patient screened for food insecurity, housing instability, transportation needs, utility  difficulties, and interpersonal safety (select all that apply as identified as concern):  []Housing or Food  []Transportation Needs  []Utility Difficulties  []Interpersonal safety  [x]None    ALLERGIES:   Patient has no known allergies.    HOME MEDICATIONS:     Prior to Admission medications    Medication Sig Start Date End Date Taking? Authorizing Provider   clopidogreL (PLAVIX) 75 mg tablet Take 75 mg by mouth once daily. 7/8/24  Yes Provider, Historical   EScitalopram oxalate (LEXAPRO) 5 MG Tab Take 5 mg by mouth once daily. 7/8/24  Yes Provider, Historical   HYDROcodone-acetaminophen (NORCO)  mg per tablet Take 1 tablet by mouth every 6 (six) hours as needed for Pain. 7/8/24  Yes Provider, Historical   LORazepam (ATIVAN) 0.5 MG tablet Take 0.5 mg by mouth every 12 (twelve) hours as needed for Anxiety. 7/8/24  Yes Provider, Historical   memantine (NAMENDA) 10 MG Tab Take 10 mg by mouth 2 (two) times daily. 9/5/24  Yes Provider, Historical   tamsulosin (FLOMAX) 0.4 mg Cap Take 1 capsule by mouth once daily. 7/8/24  Yes Provider, Historical   diltiaZEM (CARDIZEM) 120 MG tablet Take 1 tablet by mouth once daily.    Provider, Historical   fluticasone propionate (FLONASE) 50 mcg/actuation nasal spray 1 spray by Each Nostril route once daily.    Provider, Historical   ipratropium (ATROVENT) 42 mcg (0.06 %) nasal spray 2 sprays by Each Nostril route 3 (three) times daily.    Provider, Historical   XARELTO 20 mg Tab Take 20 mg by mouth once daily.    Provider, Historical     ________________________________________________________________________  INPATIENT LIST OF MEDICATIONS     Current Facility-Administered Medications:     0.9% NaCl IVPB, , Intravenous, Code/Trauma/sedation Continuous Med, Dutch Lora IV, MD, Stopped at 09/19/24 2014    chlorhexidine 0.12 % solution 15 mL, 15 mL, Mouth/Throat, BID, Zoraida Toribio MD, 15 mL at 09/20/24 0802    EScitalopram oxalate tablet 5 mg, 5 mg, Oral, Daily, Zoraida Toribio,  MD, 5 mg at 09/20/24 0803    levETIRAcetam (Keppra) 500 mg in D5W 100 mL IVPB (MB+), 500 mg, Intravenous, Q12H, Zoraida Toribio MD, Stopped at 09/20/24 0832    LORazepam injection 2 mg, 2 mg, Intravenous, Q15 Min PRN, Zoraida Toribio MD    [START ON 9/21/2024] mupirocin 2 % ointment, , Nasal, BID, BEL Jerome MD    pantoprazole injection 40 mg, 40 mg, Intravenous, Daily, Zoraida Toribio MD, 40 mg at 09/20/24 0802    sodium chloride 0.9% flush 10 mL, 10 mL, Intravenous, PRN, Zoraida Toribio MD    Scheduled Meds:   chlorhexidine  15 mL Mouth/Throat BID    EScitalopram oxalate  5 mg Oral Daily    levETIRAcetam (Keppra) IV (PEDS and ADULTS)  500 mg Intravenous Q12H    [START ON 9/21/2024] mupirocin   Nasal BID    pantoprazole  40 mg Intravenous Daily     Continuous Infusions:   0.9% NaCl   Intravenous Code/Trauma/sedation Continuous Med   Stopped at 09/19/24 2014     PRN Meds:.  Current Facility-Administered Medications:     0.9% NaCl, , Intravenous, Code/Trauma/sedation Continuous Med    lorazepam, 2 mg, Intravenous, Q15 Min PRN    sodium chloride 0.9%, 10 mL, Intravenous, PRN    PHYSICAL EXAM:     VITAL SIGNS: 24 HRS MIN & MAX LAST   Temp  Min: 98.1 °F (36.7 °C)  Max: 98.6 °F (37 °C) 98.6 °F (37 °C)   BP  Min: 91/66  Max: 150/116 138/69   Pulse  Min: 59  Max: 91  78   Resp  Min: 0  Max: 35 (!) 21   SpO2  Min: 78 %  Max: 99 % 95 %       General appearance: Well-developed male in no apparent distress.  HENT: Atraumatic head. Moist mucous membranes of oral cavity.  Eyes: Normal extraocular movements.   Neck: Supple.   Lungs: Clear to auscultation bilaterally.   Heart: Regular rate and rhythm. S1 and S2 present. No pedal edema.  Abdomen: Soft, non-distended, non-tender.  Extremities: No cyanosis, clubbing, or edema.  Skin: No Rash.   Neuro: Motor and sensory exams grossly intact.   Psych/mental status: Awake and alert. Appropriate mood and affect. Responds appropriately to questions.     LABS AND IMAGING:     Recent Labs    Lab 09/19/24  0035 09/19/24  0102 09/20/24  0507   WBC  --  12.25* 9.40   RBC  --  3.88* 4.19*   HGB  --  12.0* 12.9*   HCT 34* 34.7* 37.6*   MCV  --  89.4 89.7   MCH  --  30.9 30.8   MCHC  --  34.6 34.3   RDW  --  12.3 12.6   PLT  --  191 217   MPV  --  10.4 10.8*       Recent Labs   Lab 09/19/24  0102 09/19/24  0431 09/20/24  0500 09/20/24  0507     --   --  143   K 3.5  --   --  3.8   *  --   --  107   CO2 23  --   --  25   BUN 10.7  --   --  11.1   CREATININE 0.78  --   --  0.77   CALCIUM 7.4*  --   --  8.7*   PH  --  7.450 7.500*  --    MG  --   --   --  2.20   ALBUMIN 2.4*  --   --  2.6*   ALKPHOS 59  --   --  73   ALT 11  --   --  10   AST 13  --   --  14   BILITOT 0.4  --   --  0.4       Microbiology Results (last 7 days)       ** No results found for the last 168 hours. **             MRI Brain W WO Contrast  Narrative: EXAMINATION:  MRI BRAIN W WO CONTRAST    CLINICAL HISTORY:  Brain metastases suspected;Seizure, new-onset, no history of trauma;Stroke, follow up;    TECHNIQUE:  Multiplanar MRI sequences were performed of the brain without in following administration gadolinium based contrast.    COMPARISON:  CT brain without contrast September 19, 2024    FINDINGS:  There are marked chronic microvascular ischemic change with periventricular and deep white matter T2 FLAIR hyperintense signals.  There is no pathologic intra-axial, leptomeningeal or dural enhancement.  There is generalized cerebral cortical volume loss. Gradient echo sequences demonstrate no evidence of de phasing artifact to suggest hemorrhagic byproducts. No evidence of diffusion restriction or ADC map signal drop out to suggest acute infarct. The sella and suprasellar areas are unremarkable.    The cerebellar tonsils are normally positioned. There is no acute intracranial hemorrhage, hydrocephalus, midline shift or mass effect. No acute extra axial fluid collections identified. The mastoid air cells are clear.  Bilateral  nasal passageways is narrowed due to hypertrophic rhinitis and there is rightward nasal septal deviation.  Impression: 1.  No acute intra findings or metastatic evidence.    2.  Chronic microangiopathic ischemia and atrophy.    Electronically signed by: Mynor Virk  Date:    09/19/2024  Time:    19:22  Echo Saline Bubble? Yes  Addendum:   Left Ventricle: The left ventricle is normal in size. Mildly increased  wall thickness. Mild global hypokinesis present. There is mildly reduced  systolic function with a visually estimated ejection fraction of 45 - 50%.  Grade I diastolic dysfunction.     Right Ventricle: Mild right ventricular enlargement. Systolic function  is normal. TAPSE is 1.94 cm.     Left Atrium: Agitated saline study of the atrial septum is negative,  suggesting absence of intracardiac shunt at the atrial level.     Mitral Valve: There is no stenosis. There is mild regurgitation.     Tricuspid Valve: There is mild regurgitation.     Pulmonary Artery: No pulmonary hypertension. The estimated pulmonary  artery systolic pressure is 34 mmHg.     IVC/SVC: Intermediate venous pressure at 8 mmHg.  Narrative:   Left Ventricle: The left ventricle is normal in size. Mildly increased   wall thickness. There is mildly reduced systolic function with a visually   estimated ejection fraction of 45 - 50%. Grade I diastolic dysfunction.    Right Ventricle: Mild right ventricular enlargement. Systolic function   is normal. TAPSE is 1.94 cm.    Left Atrium: Agitated saline study of the atrial septum is negative,   suggesting absence of intracardiac shunt at the atrial level.    Mitral Valve: There is no stenosis. There is mild regurgitation.    Tricuspid Valve: There is mild regurgitation.    Pulmonary Artery: No pulmonary hypertension. The estimated pulmonary   artery systolic pressure is 34 mmHg.    IVC/SVC: Intermediate venous pressure at 8 mmHg.  CTA Head and Neck (xpd)  Narrative: EXAMINATION:  CTA HEAD AND NECK  (XPD)    CLINICAL HISTORY:  Neuro deficit, acute, stroke suspected;Stroke/TIA, determine embolic source;    TECHNIQUE:  Axial images obtained through the cervical region and [place] of [place] before and after the administration of intravenous contrast.    Coronal, sagittal, MIP and 3D reconstructions were obtained from the axial data.    Automatic exposure control was utilized to limit radiation dose.    Radiation Dose:    Total DLP: 1987 mGy*cm    COMPARISON:  CT head dated 09/19/2024    FINDINGS:  Head CT with contrast:    No interval changes when compared to the previous CT.    No enhancing abnormalities.    If present, stenosis of the carotid bulbs is measured based on NASCET criteria,    i.e. area of maximal stenosis compared to the cervical ICA distal to the bulb.    Cervical CTA:    The origins of the great vessels are patent with mild scattered calcifications.    The common carotid arteries are patent.  There are calcifications at the carotid bulbs with 20-30% stenosis on the right and 30-40% stenosis on the left.  The internal carotid arteries are patent with scattered calcifications.    The vertebral arteries are patent.  The left vertebral artery arises directly from the aortic arch.    Intracranial CTA:    There are atherosclerotic calcifications at the carotid bulbs with mild narrowing bilaterally.  The middle cerebral arteries and anterior arteries are patent.  There is fusiform dilatation of the anterior communicating artery without definite saccular aneurysm identified, measuring up to 3 mm in thickness.    The vertebral arteries are patent with mild narrowing bilaterally.  The basilar artery and posterior cerebral arteries are patent.    The dural venous sinuses are patent.    Additional findings:    There is bulky mediastinal lymphadenopathy with collapse of the right lung and pleural effusion.  Impression: 1. No large vessel occlusion or flow-limiting stenosis.  2. Fusiform dilatation of the  anterior communicating artery without definite saccular aneurysm.  3. Atherosclerotic changes at the carotid bulbs with 20-30% stenosis on the right and 30-40% stenosis on the left by NASCET criteria.  4. Bulky mediastinal lymphadenopathy with right lung collapse and pleural effusion.  No significant change from the [facility] interpretation.    Electronically signed by: Madyson Diaz  Date:    09/19/2024  Time:    08:55  X-Ray Chest 1 View for Line/Tube Placement  EXAMINATION  XR CHEST 1 VIEW FOR LINE/TUBE PLACEMENT    CLINICAL HISTORY  ETT placement;    TECHNIQUE  A total of 1 frontal image(s) of the chest.    COMPARISON  4 November 2023    FINDINGS  Lines/tubes/devices: Endotracheal tube terminates approximately 3 cm superior to the chantale.  Esophagogastric tube extends inferior to the level of diaphragm and terminates beyond the lower image margin.    The cardiomediastinal silhouette and central pulmonary vasculature are unremarkable for utilized technique.  The trachea is midline.  There is near-complete opacification of the right lung field the left lung apex clear.  No convincing pneumothorax.    There is no acute osseous or extrathoracic abnormality.    IMPRESSION  1. Endotracheal and esophageal tubes, as above.  2. Findings suggestive of large right pleural effusion and extensive airspace consolidation.    Electronically signed by: Mp Suárez  Date:    09/19/2024  Time:    06:14  CT HEAD FOR STROKE  Narrative: EXAMINATION:  CT HEAD FOR STROKE    CLINICAL HISTORY:  Neuro deficit, acute, stroke suspected;    TECHNIQUE:  Low dose axial images were obtained through the head.  Coronal and sagittal reformations were also performed. Contrast was not administered.    Automatic exposure control was utilized to reduce the patient's radiation dose.    DLP= 954    COMPARISON:  11/04/2023    FINDINGS:  No acute intracranial hemorrhage, edema or mass. No acute parenchymal abnormality.    Diffuse cerebral atrophy with  concordant ventricular enlargement.    Scattered hypodensities throughout the deep periventricular white matter.    The osseous structures are normal.    The mastoid air cells are clear.    The auditory canals are patent bilaterally.    The globes and orbital contents are normal bilaterally.    The visualized maxillary, ethmoid and sphenoid sinuses are clear.  Impression: No acute intracranial abnormality identified.  Findings of chronic microvascular ischemic disease.    Electronically signed by: Joseph Wonglen  Date:    09/19/2024  Time:    05:48        ASSESSMENT:   New onset seizures   Right-sided pleural effusion  Mediastinal lymphadenopathy  Leukocytosis, likely reactive, resolved  Normocytic anemia     History:  essential hypertension, AAA, BPH, COPD, PAD/PVD, thrombocytopenia and right BKA      PLAN:   Neurology is following   Continue IV Keppra   Seizure precautions  Fall precautions   P.r.n. Ativan for breakthrough seizures   Resume home medications as deemed appropriate once medication reconciliation is updated  Labs in AM    VTE Prophylaxis: SCDs    Discharge Planning and Disposition: TBD    I, Emilee Sevilla, NP have reviewed and discussed the case with Dr. Palacio.  Please see the attending MD's addendum for further assessment and plan.    Emilee Sevilla, [facility]  Department of Hospital Medicine   [facility]   09/20/2024    This note was created with the assistance of [employer] Voice Recognition Software. There may be transcription errors as a result of using this technology however minimal, and effort has been made to assure accuracy of transcription, but any obvious errors or omissions should be clarified with the author of the document.    _______________________________________________________________________________  MD Addendum:  I, Dr. Matias Palacio MD, assumed care of this patient today   For the patient encounter, I performed the substantive portion of the  visit, I reviewed the NP/PA documentation, treatment plan, and medical decision making.  I had face to face time with this patient           All diagnosis and differential diagnosis have been reviewed; assessment and plan has been documented; I have personally reviewed the labs and test results that are presently available; I have reviewed the patients medication list; I have reviewed the consulting providers response and recommendations. I have reviewed or attempted to review medical records based upon their availability.    All of the patient and family questions have been addressed and answered. Patient's is agreeable to the above stated plan. I will continue to monitor closely and make adjustments to medical management as needed.      09/20/2024

## 2024-09-20 NOTE — PROGRESS NOTES
[facility] - [facility] ICU  Neurology  Progress Note    Patient Name: Gerald J Lejeune  MRN: [id number]  Admission Date: 9/19/2024  Hospital Length of Stay: 1 days  Code Status: Full Code   Attending Provider: BEL Jerome MD  Primary Care Physician: Shlomo Leach MD   Principal Problem:Seizure    HPI:   Gerald J Lejeune is a 79 y.o. male with past medical history of hypertension, hyperlipidemia, PAD s/p stent 2019 on Xarelto and Plavix, COPD, and former smoker  who presented to [facility] as a transfer from [place]. Per chart, patient's girlfriend found him unresponsive followed by confusion and right avila gaze. LKN  8:15 on 9/18/2024. EMS reported seizure activity while en route to Hospital. He was treated with versed. GCS of 3 upon arrival. Patient was intubated. CT imaging not available and OSH. Patient transferred for Neurology Services. Upon arrival to [facility], patient intubated, awake, following commands and GRANADOS's ( hx of R BKA). CT Head negative for acute abnormality.  CTA H/N revealing for fusiform dilation of the anterior communicating artery without definite saccular aneurysm, negative for LVO or flow limiting stenosis.  OOW for TNK and exam and imaging thought to be not consistent with stroke. Patient was admitted to ICU for ventilator management. Vascular Neurology consulted for further evaluation.     Repeat CXR completed this morning and findings suggestive of large right pleural effusion and extensive airspace disease. Per notes Intensivist is concerned for the possibility of malignancy with large right pleural effusion and possible brain metastasis (not appreciated on CT brain) to explain new onset seizures in the differential diagnostic possibilities.     Upon assessment, patient is intubated, not on sedation, following commands. Antigravity in BUE, weak right hand .          Overview/Hospital Course:  No notes on file        Subjective:     Interval History:   Marked  improvement in neurologic exam as patient is now alert/awake, following commands.  Patient was extubated.  No further seizure activity overnight or this a.m. per patient's nurse report.    MRI brain w w/o unrevealing for abnormal enhancement, restricted diffusion, or any other acute intracranial abnormalities.    Current Neurological Medications:     Current Facility-Administered Medications   Medication Dose Route Frequency Provider Last Rate Last Admin    0.9% NaCl IVPB   Intravenous Code/Trauma/sedation Continuous Med Dutch Lora IV, MD   Stopped at 09/19/24 2014    chlorhexidine 0.12 % solution 15 mL  15 mL Mouth/Throat BID Zoraida Toribio MD   15 mL at 09/20/24 0802    EScitalopram oxalate tablet 5 mg  5 mg Oral Daily Zoraida Toribio MD   5 mg at 09/20/24 0803    levETIRAcetam (Keppra) 500 mg in D5W 100 mL IVPB (MB+)  500 mg Intravenous Q12H Zoraida Toribio MD   Stopped at 09/20/24 0832    LORazepam injection 2 mg  2 mg Intravenous Q15 Min PRN Zoraida Toribio MD        [START ON 9/21/2024] mupirocin 2 % ointment   Nasal BID BEL Jerome MD        pantoprazole injection 40 mg  40 mg Intravenous Daily Zoraida Toribio MD   40 mg at 09/20/24 0802    propofol (DIPRIVAN) 10 mg/mL infusion  0-50 mcg/kg/min (Dosing Weight) Intravenous Continuous Zoraida Toribio MD 18.5 mL/hr at 09/20/24 0805 35 mcg/kg/min at 09/20/24 0805    sodium chloride 0.9% flush 10 mL  10 mL Intravenous PRN Zoraida Toribio MD           Review of Systems  A 14pt ros was reviewed & is negative unless o/w documented in the hpi    Objective:     Vital Signs (Most Recent):  Temp: 98.6 °F (37 °C) (09/20/24 1200)  Pulse: 78 (09/20/24 1200)  Resp: (!) 21 (09/20/24 1200)  BP: 138/69 (09/20/24 1200)  SpO2: 95 % (09/20/24 1200) Vital Signs (24h Range):  Temp:  [98.1 °F (36.7 °C)-98.6 °F (37 °C)] 98.6 °F (37 °C)  Pulse:  [58-91] 78  Resp:  [0-35] 21  SpO2:  [78 %-99 %] 95 %  BP: ()/() 138/69     Weight: 87.9 kg (193 lb 12.6 oz)  Body mass index is  29.46 kg/m².     Physical Exam   GENERAL: NAD, calm, cooperative, appropriate, awake/alert, R BKA  MENTAL STATUS: Oriented, follows commands reliably  SPEECH/LANGUAGE: Clear, coherent  CN:  EOMI gaze conjugate, visual fields intact  PERRLA  Motor: no focal motor weakness  Sensory: Normal to tactile stim/vibration  Gait: not observed         Significant Labs:   Recent Lab Results  (Last 5 results in the past 24 hours)        09/20/24  0507   09/20/24  0500   09/19/24  2212   09/19/24  2134   09/19/24  1350        Phencyclidine       Negative         Albumin/Globulin Ratio 0.9               A2C EF         58       A4C EF         45       Albumin 2.6               ALP 73               Allens Test   Yes             ALT 10               Amphetamines, Urine       Negative         Anion Gap 11.0               Ao peak tonya         1.98       Ao VTI         39.00       Appearance, UA     Clear           AST 14               AV mean gradient         7       AV index (prosthetic)         0.44       AV peak gradient         16       AV Velocity Ratio         0.39       Bacteria, UA     None Seen           Barbituates, Urine       Negative         Baso # 0.03               Basophil % 0.3               Benzodiazepine, Urine       Positive         Bilirubin (UA)     Negative           BILIRUBIN TOTAL 0.4               BUN 11.1               BUN/CREAT RATIO 14               Calcium 8.7               Calcium Level Ionized   1.19             Cannabinoids, Urine       Negative         Chloride 107               CO2 25               Cocaine, Urine       Negative         Color, UA     Light-Yellow           Creatinine 0.77               Left Ventricle Relative Wall Thickness         0.60       Drawn by   swcrt             E/A ratio         0.79       E/E' ratio         8.27       eGFR >60               Eos # 0.53               Eos % 5.6               Fentanyl, Urine       Negative         FIO2, Blood gas   35             FS         32        Globulin, Total 2.8               Glucose 89               Glucose, UA     Normal           Hematocrit 37.6               Hemoglobin 12.9               Immature Grans (Abs) 0.04               Immature Granulocytes 0.4               IVSd         1.26       Ketones, UA     Negative           LA area A2C         15.90       LA area A4C         18.50       LA size         3.60       LA volume         44.80       LA Volume Index (Mod)         22.2       Leukocyte Esterase, UA     Negative           LV LATERAL E/E' RATIO         6.89       LV SEPTAL E/E' RATIO         10.33       LV EDV BP         83.10       LV Diastolic Volume Index         41.14       Left Ventricular End Diastolic Volume by Teichholz Method         83.10       LV EDV A2C         [id number]       LV EDV A4C         79.50       Left Ventricular End Systolic Volume by Teichholz Method         33.30       LV ESV A2C         40.20       LV ESV A4C         44.60       LVIDd         4.30       LVIDs         2.94       LV mass         203.04       LV Mass Index         101       Left Ventricular Outflow Tract Mean Gradient         1.00       Left Ventricular Outflow Tract Mean Velocity         0.55       LVOT peak donte         0.77       LVOT peak VTI         17.10       LV ESV BP         33.30       LV Systolic Volume Index         16.5       Lymph # 1.08               LYMPH % 11.5               Magnesium  2.20               MCH 30.8               MCHC 34.3               MCV 89.7               MDMA, Urine       Negative         Mean e'         0.08       Mech Vt   500             MODE   AC             Mono # 0.81               Mono % 8.6               MPV 10.8               Mucous, UA     Trace           MV Peak A Donte         0.78       MV Peak E Donte         0.62       Neut # 6.91               Neut % 73.6               NITRITE UA     Negative           nRBC 0.0               Blood, UA     Trace           Fernando's Biplane MOD Ejection Fraction          52       Opiates, Urine       Negative         Oxygen Device, Blood gas   Ventilator             PEEP   5.0             pH, UA     6.0           pH, Urine       6.0         Phosphorus Level 3.5               Platelet Count 217               Base Excess, Blood gas   7.40             POC HCO3   31.2             POC PCO2   40.0             POC PH   7.500             POC PO2   64.0             Potassium 3.8               Potassium, Blood Gas   3.3             PROTEIN TOTAL 5.4               Protein, UA     Trace           PV peak gradient         2       PV PEAK VELOCITY         0.77       Posterior Wall         1.30       Est. RA pres         8       RBC 4.19               RBC, UA     11-20           RDW 12.6               Mech RR   18             RV TB RVSP         11       Sample site   Left Radial Artery             Sample Type   Arterial Blood             sO2, Blood gas   94.0             Sodium 143               Sodium, Blood Gas   140             Specific Gravity,UA     1.029           Specific Gravity, Urine Auto       1.029         Squamous Epithelial Cells, UA     None Seen           TAPSE         1.94       TOC2, Blood gas   32.4             TDI SEPTAL         0.06       TDI LATERAL         0.09       Triscuspid Valve Regurgitation Peak Gradient         26       TR Max Donte         2.55       TV resting pulmonary artery pressure         34       Urobilinogen, UA     Normal           WBC, UA     6-10           WBC 9.40               ZLVIDD         -3.25       ZLVIDS         -1.71                              Significant Imaging:   MRI brain w w/o:  Impression:     1.  No acute intra findings or metastatic evidence.     2.  Chronic microangiopathic ischemia and atrophy.       I have reviewed all pertinent imaging results/findings within the past 24 hours.  Assessment and Plan:     * Seizure  Continue keppra 500 mg BID   Give ativan 2 mg PRN for witnessed seizures  Seizure precautions  Further recommendations  per MD            VTE Risk Mitigation (From admission, onward)           Ordered     IP VTE HIGH RISK PATIENT  Once         09/19/24 0144     Place sequential compression device  Until discontinued         09/19/24 0144                    Leti Larios, [facility]  Inpatient Neurology  [facility] - [address] [place] [address]    I have seen/examined the patient.  NP was scribe.  I agree with the findings unless outlined below:    Ranjit Webb MD  [facility]     New onset sz  No stroke  Pt at baseline  Continue keppra  Signing off

## 2024-09-20 NOTE — PLAN OF CARE
Problem: Adult Inpatient Plan of Care  Goal: Plan of Care Review  Outcome: Progressing  Goal: Patient-Specific Goal (Individualized)  Outcome: Progressing  Goal: Absence of Hospital-Acquired Illness or Injury  Outcome: Progressing  Goal: Optimal Comfort and Wellbeing  Outcome: Progressing  Goal: Readiness for Transition of Care  Outcome: Progressing     Problem: Wound  Goal: Optimal Coping  Outcome: Progressing  Goal: Optimal Functional Ability  Outcome: Progressing  Goal: Absence of Infection Signs and Symptoms  Outcome: Progressing  Goal: Improved Oral Intake  Outcome: Progressing  Goal: Optimal Pain Control and Function  Outcome: Progressing  Goal: Skin Health and Integrity  Outcome: Progressing  Goal: Optimal Wound Healing  Outcome: Progressing     Problem: Fall Injury Risk  Goal: Absence of Fall and Fall-Related Injury  Outcome: Progressing     Problem: Skin Injury Risk Increased  Goal: Skin Health and Integrity  Outcome: Progressing     Problem: Infection  Goal: Absence of Infection Signs and Symptoms  Outcome: Progressing      Risks/benefits discussed with patient/surrogate

## 2024-09-20 NOTE — SUBJECTIVE & OBJECTIVE
Subjective:     Interval History:   Marked improvement in neurologic exam as patient is now alert/awake, following commands.  Patient was extubated.  No further seizure activity overnight or this a.m. per patient's nurse report.    MRI brain w w/o unrevealing for abnormal enhancement, restricted diffusion, or any other acute intracranial abnormalities.    Current Neurological Medications:     Current Facility-Administered Medications   Medication Dose Route Frequency Provider Last Rate Last Admin    0.9% NaCl IVPB   Intravenous Code/Trauma/sedation Continuous Med Dutch Lora IV, MD   Stopped at 09/19/24 2014    chlorhexidine 0.12 % solution 15 mL  15 mL Mouth/Throat BID Zoraida Toribio MD   15 mL at 09/20/24 0802    EScitalopram oxalate tablet 5 mg  5 mg Oral Daily Zoraida Toribio MD   5 mg at 09/20/24 0803    levETIRAcetam (Keppra) 500 mg in D5W 100 mL IVPB (MB+)  500 mg Intravenous Q12H Zoraida Toribio MD   Stopped at 09/20/24 0832    LORazepam injection 2 mg  2 mg Intravenous Q15 Min PRN Zoraida Toribio MD        [START ON 9/21/2024] mupirocin 2 % ointment   Nasal BID BEL Jerome MD        pantoprazole injection 40 mg  40 mg Intravenous Daily Zoraida Toribio MD   40 mg at 09/20/24 0802    propofol (DIPRIVAN) 10 mg/mL infusion  0-50 mcg/kg/min (Dosing Weight) Intravenous Continuous Zoraida Toribio MD 18.5 mL/hr at 09/20/24 0805 35 mcg/kg/min at 09/20/24 0805    sodium chloride 0.9% flush 10 mL  10 mL Intravenous PRN Zoraida Toribio MD           Review of Systems  A 14pt ros was reviewed & is negative unless o/w documented in the hpi    Objective:     Vital Signs (Most Recent):  Temp: 98.6 °F (37 °C) (09/20/24 1200)  Pulse: 78 (09/20/24 1200)  Resp: (!) 21 (09/20/24 1200)  BP: 138/69 (09/20/24 1200)  SpO2: 95 % (09/20/24 1200) Vital Signs (24h Range):  Temp:  [98.1 °F (36.7 °C)-98.6 °F (37 °C)] 98.6 °F (37 °C)  Pulse:  [58-91] 78  Resp:  [0-35] 21  SpO2:  [78 %-99 %] 95 %  BP: ()/() 138/69     Weight:  87.9 kg (193 lb 12.6 oz)  Body mass index is 29.46 kg/m².     Physical Exam   GENERAL: NAD, calm, cooperative, appropriate, awake/alert, R BKA  MENTAL STATUS: Oriented, follows commands reliably  SPEECH/LANGUAGE: Clear, coherent  CN:  EOMI gaze conjugate, visual fields intact  PERRLA  Motor: no focal motor weakness  Sensory: Normal to tactile stim/vibration  Gait: not observed         Significant Labs:   Recent Lab Results  (Last 5 results in the past 24 hours)        09/20/24  0507   09/20/24  0500   09/19/24  2212   09/19/24  2134   09/19/24  1350        Phencyclidine       Negative         Albumin/Globulin Ratio 0.9               A2C EF         58       A4C EF         45       Albumin 2.6               ALP 73               Allens Test   Yes             ALT 10               Amphetamines, Urine       Negative         Anion Gap 11.0               Ao peak tonya         1.98       Ao VTI         39.00       Appearance, UA     Clear           AST 14               AV mean gradient         7       AV index (prosthetic)         0.44       AV peak gradient         16       AV Velocity Ratio         0.39       Bacteria, UA     None Seen           Barbituates, Urine       Negative         Baso # 0.03               Basophil % 0.3               Benzodiazepine, Urine       Positive         Bilirubin (UA)     Negative           BILIRUBIN TOTAL 0.4               BUN 11.1               BUN/CREAT RATIO 14               Calcium 8.7               Calcium Level Ionized   1.19             Cannabinoids, Urine       Negative         Chloride 107               CO2 25               Cocaine, Urine       Negative         Color, UA     Light-Yellow           Creatinine 0.77               Left Ventricle Relative Wall Thickness         0.60       Drawn by   swcrt             E/A ratio         0.79       E/E' ratio         8.27       eGFR >60               Eos # 0.53               Eos % 5.6               Fentanyl, Urine       Negative          FIO2, Blood gas   35             FS         32       Globulin, Total 2.8               Glucose 89               Glucose, UA     Normal           Hematocrit 37.6               Hemoglobin 12.9               Immature Grans (Abs) 0.04               Immature Granulocytes 0.4               IVSd         1.26       Ketones, UA     Negative           LA area A2C         15.90       LA area A4C         18.50       LA size         3.60       LA volume         44.80       LA Volume Index (Mod)         22.2       Leukocyte Esterase, UA     Negative           LV LATERAL E/E' RATIO         6.89       LV SEPTAL E/E' RATIO         10.33       LV EDV BP         83.10       LV Diastolic Volume Index         41.14       Left Ventricular End Diastolic Volume by Teichholz Method         83.10       LV EDV A2C         [id number]       LV EDV A4C         79.50       Left Ventricular End Systolic Volume by Teichholz Method         33.30       LV ESV A2C         40.20       LV ESV A4C         44.60       LVIDd         4.30       LVIDs         2.94       LV mass         203.04       LV Mass Index         101       Left Ventricular Outflow Tract Mean Gradient         1.00       Left Ventricular Outflow Tract Mean Velocity         0.55       LVOT peak donte         0.77       LVOT peak VTI         17.10       LV ESV BP         33.30       LV Systolic Volume Index         16.5       Lymph # 1.08               LYMPH % 11.5               Magnesium  2.20               MCH 30.8               MCHC 34.3               MCV 89.7               MDMA, Urine       Negative         Mean e'         0.08       Mech Vt   500             MODE   AC             Mono # 0.81               Mono % 8.6               MPV 10.8               Mucous, UA     Trace           MV Peak A Donte         0.78       MV Peak E Donte         0.62       Neut # 6.91               Neut % 73.6               NITRITE UA     Negative           nRBC 0.0               Blood, UA     Trace            Fernando's Biplane MOD Ejection Fraction         52       Opiates, Urine       Negative         Oxygen Device, Blood gas   Ventilator             PEEP   5.0             pH, UA     6.0           pH, Urine       6.0         Phosphorus Level 3.5               Platelet Count 217               Base Excess, Blood gas   7.40             POC HCO3   31.2             POC PCO2   40.0             POC PH   7.500             POC PO2   64.0             Potassium 3.8               Potassium, Blood Gas   3.3             PROTEIN TOTAL 5.4               Protein, UA     Trace           PV peak gradient         2       PV PEAK VELOCITY         0.77       Posterior Wall         1.30       Est. RA pres         8       RBC 4.19               RBC, UA     11-20           RDW 12.6               Mech RR   18             RV TB RVSP         11       Sample site   Left Radial Artery             Sample Type   Arterial Blood             sO2, Blood gas   94.0             Sodium 143               Sodium, Blood Gas   140             Specific Gravity,UA     1.029           Specific Gravity, Urine Auto       1.029         Squamous Epithelial Cells, UA     None Seen           TAPSE         1.94       TOC2, Blood gas   32.4             TDI SEPTAL         0.06       TDI LATERAL         0.09       Triscuspid Valve Regurgitation Peak Gradient         26       TR Max Donte         2.55       TV resting pulmonary artery pressure         34       Urobilinogen, UA     Normal           WBC, UA     6-10           WBC 9.40               ZLVIDD         -3.25       ZLVIDS         -1.71                              Significant Imaging:   MRI brain w w/o:  Impression:     1.  No acute intra findings or metastatic evidence.     2.  Chronic microangiopathic ischemia and atrophy.       I have reviewed all pertinent imaging results/findings within the past 24 hours.

## 2024-09-21 LAB
ALBUMIN SERPL-MCNC: 2.7 G/DL (ref 3.4–4.8)
ALBUMIN/GLOB SERPL: 1 RATIO (ref 1.1–2)
ALP SERPL-CCNC: 75 UNIT/L (ref 40–150)
ALT SERPL-CCNC: 12 UNIT/L (ref 0–55)
ANION GAP SERPL CALC-SCNC: 8 MEQ/L
AST SERPL-CCNC: 12 UNIT/L (ref 5–34)
BASOPHILS # BLD AUTO: 0.04 X10(3)/MCL
BASOPHILS NFR BLD AUTO: 0.4 %
BILIRUB SERPL-MCNC: 0.6 MG/DL
BUN SERPL-MCNC: 10.2 MG/DL (ref 8.4–25.7)
CALCIUM SERPL-MCNC: 8.6 MG/DL (ref 8.8–10)
CHLORIDE SERPL-SCNC: 106 MMOL/L (ref 98–107)
CLARITY BODY FLUID (OLG): NORMAL
CO2 SERPL-SCNC: 27 MMOL/L (ref 23–31)
COLOR BODY FLUID (OLG): NORMAL
CREAT SERPL-MCNC: 0.72 MG/DL (ref 0.73–1.18)
CREAT/UREA NIT SERPL: 14
EOSINOPHIL # BLD AUTO: 0 X10(3)/MCL (ref 0–0.9)
EOSINOPHIL NFR BLD AUTO: 0 %
ERYTHROCYTE [DISTWIDTH] IN BLOOD BY AUTOMATED COUNT: 12.4 % (ref 11.5–17)
GFR SERPLBLD CREATININE-BSD FMLA CKD-EPI: >60 ML/MIN/1.73/M2
GLOBULIN SER-MCNC: 2.8 GM/DL (ref 2.4–3.5)
GLUCOSE FLD-MCNC: 110 MG/DL
GLUCOSE SERPL-MCNC: 94 MG/DL (ref 82–115)
GRAM STN SPEC: NORMAL
GRAM STN SPEC: NORMAL
HCT VFR BLD AUTO: 36.4 % (ref 42–52)
HGB BLD-MCNC: 12.2 G/DL (ref 14–18)
IMM GRANULOCYTES # BLD AUTO: 0.04 X10(3)/MCL (ref 0–0.04)
IMM GRANULOCYTES NFR BLD AUTO: 0.4 %
KOH PREP SPEC: NORMAL
LDH FLD-CCNC: 156 U/L
LYMPHOCYTES # BLD AUTO: 1.15 X10(3)/MCL (ref 0.6–4.6)
LYMPHOCYTES NFR BLD AUTO: 11.7 %
LYMPHOCYTES NFR FLD MANUAL: 60 %
MACROPHAGES MAN COUNT BF (OLG): 42
MAGNESIUM SERPL-MCNC: 2 MG/DL (ref 1.6–2.6)
MCH RBC QN AUTO: 30.2 PG (ref 27–31)
MCHC RBC AUTO-ENTMCNC: 33.5 G/DL (ref 33–36)
MCV RBC AUTO: 90.1 FL (ref 80–94)
MESOTHELIAL CELLS MAN COUNT BF (OLG): 5
MONOCYTE MAN % BF (OLG): 5 %
MONOCYTES # BLD AUTO: 0.83 X10(3)/MCL (ref 0.1–1.3)
MONOCYTES NFR BLD AUTO: 8.4 %
NEUTROPHILS # BLD AUTO: 7.77 X10(3)/MCL (ref 2.1–9.2)
NEUTROPHILS MAN % BF (OLG): 35 %
NEUTROPHILS NFR BLD AUTO: 79.1 %
NRBC BLD AUTO-RTO: 0 %
PH FLD: 7.86 [PH] (ref 5–10)
PHOSPHATE SERPL-MCNC: 3.1 MG/DL (ref 2.3–4.7)
PLATELET # BLD AUTO: 207 X10(3)/MCL (ref 130–400)
PMV BLD AUTO: 10.6 FL (ref 7.4–10.4)
POTASSIUM SERPL-SCNC: 3.4 MMOL/L (ref 3.5–5.1)
PROT FLD-MCNC: 3.6 GM/DL
PROT SERPL-MCNC: 5.5 GM/DL (ref 5.8–7.6)
RBC # BLD AUTO: 4.04 X10(6)/MCL (ref 4.7–6.1)
SODIUM SERPL-SCNC: 141 MMOL/L (ref 136–145)
WBC # BLD AUTO: 9.83 X10(3)/MCL (ref 4.5–11.5)
WBC # FLD AUTO: 1313 /UL

## 2024-09-21 PROCEDURE — 36415 COLL VENOUS BLD VENIPUNCTURE: CPT | Performed by: NURSE PRACTITIONER

## 2024-09-21 PROCEDURE — 63600175 PHARM REV CODE 636 W HCPCS: Performed by: INTERNAL MEDICINE

## 2024-09-21 PROCEDURE — 94760 N-INVAS EAR/PLS OXIMETRY 1: CPT

## 2024-09-21 PROCEDURE — 84157 ASSAY OF PROTEIN OTHER: CPT | Performed by: INTERNAL MEDICINE

## 2024-09-21 PROCEDURE — 83986 ASSAY PH BODY FLUID NOS: CPT | Performed by: INTERNAL MEDICINE

## 2024-09-21 PROCEDURE — 85025 COMPLETE CBC W/AUTO DIFF WBC: CPT | Performed by: NURSE PRACTITIONER

## 2024-09-21 PROCEDURE — 27000221 HC OXYGEN, UP TO 24 HOURS

## 2024-09-21 PROCEDURE — 84100 ASSAY OF PHOSPHORUS: CPT | Performed by: INTERNAL MEDICINE

## 2024-09-21 PROCEDURE — 83735 ASSAY OF MAGNESIUM: CPT | Performed by: INTERNAL MEDICINE

## 2024-09-21 PROCEDURE — 88305 TISSUE EXAM BY PATHOLOGIST: CPT | Performed by: INTERNAL MEDICINE

## 2024-09-21 PROCEDURE — 88342 IMHCHEM/IMCYTCHM 1ST ANTB: CPT

## 2024-09-21 PROCEDURE — 25000003 PHARM REV CODE 250: Performed by: INTERNAL MEDICINE

## 2024-09-21 PROCEDURE — 92610 EVALUATE SWALLOWING FUNCTION: CPT

## 2024-09-21 PROCEDURE — 80053 COMPREHEN METABOLIC PANEL: CPT | Performed by: NURSE PRACTITIONER

## 2024-09-21 PROCEDURE — 88341 IMHCHEM/IMCYTCHM EA ADD ANTB: CPT

## 2024-09-21 PROCEDURE — 25000003 PHARM REV CODE 250: Performed by: STUDENT IN AN ORGANIZED HEALTH CARE EDUCATION/TRAINING PROGRAM

## 2024-09-21 PROCEDURE — 0W993ZX DRAINAGE OF RIGHT PLEURAL CAVITY, PERCUTANEOUS APPROACH, DIAGNOSTIC: ICD-10-PCS | Performed by: INTERNAL MEDICINE

## 2024-09-21 PROCEDURE — 89051 BODY FLUID CELL COUNT: CPT | Performed by: INTERNAL MEDICINE

## 2024-09-21 PROCEDURE — 87220 TISSUE EXAM FOR FUNGI: CPT | Performed by: INTERNAL MEDICINE

## 2024-09-21 PROCEDURE — 82945 GLUCOSE OTHER FLUID: CPT | Performed by: INTERNAL MEDICINE

## 2024-09-21 PROCEDURE — 83615 LACTATE (LD) (LDH) ENZYME: CPT | Performed by: INTERNAL MEDICINE

## 2024-09-21 PROCEDURE — 11000001 HC ACUTE MED/SURG PRIVATE ROOM

## 2024-09-21 PROCEDURE — 88112 CYTOPATH CELL ENHANCE TECH: CPT

## 2024-09-21 PROCEDURE — 87205 SMEAR GRAM STAIN: CPT | Performed by: INTERNAL MEDICINE

## 2024-09-21 PROCEDURE — 87070 CULTURE OTHR SPECIMN AEROBIC: CPT | Performed by: INTERNAL MEDICINE

## 2024-09-21 RX ORDER — POTASSIUM CHLORIDE 20 MEQ/1
40 TABLET, EXTENDED RELEASE ORAL ONCE
Status: COMPLETED | OUTPATIENT
Start: 2024-09-21 | End: 2024-09-21

## 2024-09-21 RX ORDER — LEVETIRACETAM 500 MG/1
500 TABLET ORAL 2 TIMES DAILY
Status: DISCONTINUED | OUTPATIENT
Start: 2024-09-21 | End: 2024-09-24 | Stop reason: HOSPADM

## 2024-09-21 RX ORDER — LIDOCAINE HYDROCHLORIDE 10 MG/ML
INJECTION, SOLUTION INFILTRATION; PERINEURAL
Status: DISPENSED
Start: 2024-09-21 | End: 2024-09-22

## 2024-09-21 RX ADMIN — PANTOPRAZOLE SODIUM 40 MG: 40 TABLET, DELAYED RELEASE ORAL at 04:09

## 2024-09-21 RX ADMIN — ENOXAPARIN SODIUM 40 MG: 40 INJECTION SUBCUTANEOUS at 04:09

## 2024-09-21 RX ADMIN — MUPIROCIN: 20 OINTMENT TOPICAL at 09:09

## 2024-09-21 RX ADMIN — DILTIAZEM HYDROCHLORIDE 60 MG: 60 TABLET, FILM COATED ORAL at 08:09

## 2024-09-21 RX ADMIN — LEVETIRACETAM 500 MG: 500 TABLET, FILM COATED ORAL at 08:09

## 2024-09-21 RX ADMIN — POTASSIUM CHLORIDE 40 MEQ: 1500 TABLET, EXTENDED RELEASE ORAL at 08:09

## 2024-09-21 RX ADMIN — MEMANTINE 10 MG: 5 TABLET ORAL at 08:09

## 2024-09-21 RX ADMIN — CHLORHEXIDINE GLUCONATE 0.12% ORAL RINSE 15 ML: 1.2 LIQUID ORAL at 08:09

## 2024-09-21 RX ADMIN — ESCITALOPRAM OXALATE 5 MG: 5 TABLET, FILM COATED ORAL at 08:09

## 2024-09-21 RX ADMIN — MUPIROCIN: 20 OINTMENT TOPICAL at 08:09

## 2024-09-21 RX ADMIN — TAMSULOSIN HYDROCHLORIDE 0.4 MG: 0.4 CAPSULE ORAL at 08:09

## 2024-09-21 RX ADMIN — PANTOPRAZOLE SODIUM 40 MG: 40 TABLET, DELAYED RELEASE ORAL at 06:09

## 2024-09-21 NOTE — PT/OT/SLP EVAL
[facility]  Speech Language Pathology Department  Clinical Swallow Evaluation    Patient Name:  Gerald J Lejeune   MRN:  [id number]    Recommendations     General recommendations:  SLP follow up x1 for diet tolerance  Solid texture recommendation:  Soft & Bite Sized Diet - IDDSI Level 6  Liquid consistency recommendation: Thin liquids - IDDSI Level 0   Medications: per patient preference  Swallow strategies/precautions: small bites/sips, slow rate, and upright for PO intake  Precautions:      History     Gerald J Lejeune is a/n 79 y.o. male admitted as a transfer from [place] for Neurology service for new onset seizures. Pt was intubated for airway protection in the ED and admitted to ICU on mechanical ventilation. Extubated yesterday 9/20/24.    No past medical history on file.  No past surgical history on file.  Prior Intubation HX:  9/19-9/20/24    Home diet texture/consistency: Regular and thin liquids  Current method of nutrition: NPO    Patient complaint: Wants to go home.    Imaging   Results for orders placed during the hospital encounter of 11/04/23    X-Ray Chest 1 View    Narrative  EXAMINATION:  XR CHEST 1 VIEW    CLINICAL HISTORY:  r/o bleeding or hemorrhage;    TECHNIQUE:  Single view of the chest    COMPARISON:  No prior imaging available for comparison.    FINDINGS:  No focal opacification, pleural effusion, or pneumothorax.    Cardiomediastinal silhouette is prominent.    No acute osseous abnormality.    Impression  No acute cardiopulmonary process.      Electronically signed by: Joseph Escalera  Date:    11/04/2023  Time:    16:38    No results found for this or any previous visit.    No results found for this or any previous visit.    Subjective     Patient calm and cooperative.    Patient goals: to eat/drink   Spiritual/Cultural/Taoist Beliefs/Practices that affect care: no    Pain/Comfort: Pain Rating 1: 0/10    Respiratory Status:  3L via nasal  cannula    Restraints/positioning devices: none    Objective     ORAL MUSCULATURE  Dentition: edentulous  Secretion Management: adequate  Mucosal Quality: good  Facial Movement: WFL  Buccal Strength & Mobility: WFL  Mandibular Strength & Mobility: WFL  Oral Labial Strength & Mobility: WFL  Lingual Strength & Mobility: WFL  Vocal Quality: adequate  Volitional Cough: Able to clear secretions  Volitional Swallow: WFL    PO TRIALS  Consistency Fed By Oral Symptoms Pharyngeal Symptoms   Thin liquid by straw SLP None None   Puree SLP None None   Soft & Bite-sized solid SLP None None     Assessment     Pt presents with no signs/sx of aspiration. ST recs: initiate PO diet of soft and bite sized solids, thin liquids straws ok. ST will f/u x1 for diet tolerance.    Outcome Measures     Functional Oral Intake Scale: 5 - Total oral diet with multiple consistencies, by requiring special preparation or compensations    Goals     Multidisciplinary Problems       SLP Goals       Not on file                  Education     Patient provided with verbal education regarding diet recommendations and swallowing precautions.  Understanding was verbalized.    Plan     SLP Follow-Up:  Yes   Plan of Care reviewed with:  patient     Time Tracking     SLP Treatment Date:    09/21/2024  Speech Start Time:  0830  Speech Stop Time:  0845     Speech Total Time (min):  15 min    Billable minutes:  Swallow and Oral Function Evaluation, 15 minutes     09/21/2024

## 2024-09-21 NOTE — PROGRESS NOTES
[facility]  Hospital Medicine Progress Note        Chief Complaint: Inpatient Follow-up for seizures     HPI:   The Pt  is a 79 y.o. male with a PMHx of essential hypertension, AAA, BPH, COPD, Home O2 at 3L/min, former heavy smoker 3 PPD x 50 years, stopped year ago, PAD/PVD on Plavix and Xarelto s/p right BKA who presented to [facility] on 9/19/2024 via EMS as a transfer from [facility] for higher level of care.  He initially presented to the [facility] facility after family noticed a rightward gaze around 8:15 on 09/18/2024. He then went unresponsive, staring and body was stiffening and was turning blue. EMS were called and patient reportedly had a seizure EN route to the [facility] ED.  Upon arrival to the [facility] ED he was intubated for airway protection.  CT scan was unavailable therefore patient was transferred to [facility] for higher level of care. CT head was negative for acute intracranial abnormality.  CXR suggestive of large right-sided pleural effusion and extensive airspace consolidation.  CTA head/neck negative for LVO or flow-limiting stenosis, fusiform dilatation of the anterior communicating artery without definite saccular  aneurysms; carotid bulbs with 20-30% stenosis on the right 30 40% stenosis on the left; bulky mediastinal lymphadenopathy with right lung collapse and pleural effusion.  Labs were notable for WBC 12.25, hemoglobin 12, hematocrit 34.7, INR 1.8, chloride 108, calcium 7.4.  Hemoglobin A1c 5.  TSH normal.  UDS positive for benzodiazepines.  Neurology was consulted, thrombolytics or thrombectomy not recommended with low suspicion for acute stroke. Pt was started on IV Keppra 500 mg bid and  admitted to ICU on mechanical ventilation.  MRI brain with and without contrast on 09/19 negative for mass, metastatic disease or acute stroke. EEG unremarkable.  Echocardiogram with LVEF 45-50%, grade 1 diastolic dysfunction.  Tolerated extubation on  09/20/2024.  Cleared for downgrade to the floor on 09/20/2024 and care transferred to  service.    Post extubation, no further seizures reported. Pt's mental status returned to baseline. A CT chest was done on 9/20/24 showed persistent large Rt pleural effusion with near obstructive mucous plugging versus endobronchial lesion with almost complete consolidative collapse of the right lung and associated mediastinal adenopathy  suspicious for underlying malignancy. Home Plavix and Xarelto held and Pulmonology was consulted.      Interval Hx:   Pt was seen at bedside. Awake , alert, oriented x3.  Afebrile. Hemodynamics are stable , O2 demand is stable at baseline 3L/min.   Labs today WBC 9.8, Hgb 12.2, K 3.4, Cr 0.7, normal LFTs.    Pulmonology consult obtained and thoracentesis planned later today    Case was discussed with patient's nurse and  on the floor.    Objective/physical exam:  General: In no acute distress, afebrile, On NC  Chest: Absent breath sounds Rt lung  Heart: RRR, +S1, S2, no appreciable murmur  Abdomen: Soft, nontender, BS +  MSK: Warm, no left  lower extremity edema/ clubbing/cyanosis, Rt BKA  Neurologic: Alert and oriented x4      VITAL SIGNS: 24 HRS MIN & MAX LAST   Temp  Min: 97.9 °F (36.6 °C)  Max: 98.6 °F (37 °C) 97.9 °F (36.6 °C)   BP  Min: 122/85  Max: 155/92 126/77   Pulse  Min: 66  Max: 78  73   Resp  Min: 15  Max: 31 20   SpO2  Min: 84 %  Max: 97 % 97 %     I have reviewed the following labs:  Recent Labs   Lab 09/19/24  0102 09/20/24  0507 09/21/24  0249   WBC 12.25* 9.40 9.83   RBC 3.88* 4.19* 4.04*   HGB 12.0* 12.9* 12.2*   HCT 34.7* 37.6* 36.4*   MCV 89.4 89.7 90.1   MCH 30.9 30.8 30.2   MCHC 34.6 34.3 33.5   RDW 12.3 12.6 12.4    217 207   MPV 10.4 10.8* 10.6*     Recent Labs   Lab 09/19/24  0102 09/19/24  0431 09/20/24  0500 09/20/24  0507 09/21/24  0249     --   --  143 141   K 3.5  --   --  3.8 3.4*   *  --   --  107 106   CO2 23  --   --  25 27    BUN 10.7  --   --  11.1 10.2   CREATININE 0.78  --   --  0.77 0.72*   CALCIUM 7.4*  --   --  8.7* 8.6*   PH  --  7.450 7.500*  --   --    MG  --   --   --  2.20 2.00   ALBUMIN 2.4*  --   --  2.6* 2.7*   ALKPHOS 59  --   --  73 75   ALT 11  --   --  10 12   AST 13  --   --  14 12   BILITOT 0.4  --   --  0.4 0.6     Microbiology Results (last 7 days)       Procedure Component Value Units Date/Time    CHANDLER Prep [[id number]] Collected: 09/21/24 1442    Order Status: Completed Specimen: Body Fluid from Pleural Fluid Updated: 09/21/24 1747     KOH Prep No fungal elements seen    Body Fluid Culture [[id number]] Collected: 09/21/24 1442    Order Status: Sent Specimen: Body Fluid from Thoracentesis Fluid Updated: 09/21/24 1502    Gram Stain [[id number]] Collected: 09/21/24 1442    Order Status: Sent Specimen: Body Fluid from Pleural Fluid Updated: 09/21/24 1502    Fungal Culture [[id number]] Collected: 09/21/24 1442    Order Status: Sent Specimen: Body Fluid from Pleural Fluid Updated: 09/21/24 1501    Mycobacteria and Nocardia Culture [[id number]] Collected: 09/21/24 1442    Order Status: Sent Specimen: Body Fluid from Thoracentesis Fluid Updated: 09/21/24 1501             See below for Radiology    Assessment/Plan:  New onset seizure disorder - unclear etiology, POA  Acute on chronic hypoxic/hypercapnic resp failure during ictal phase - S/P intubation and extubation   Rt lung collapse due to mucous plugging versus endobronchial lesion , POA  Rt pleural effusion and associated bulky mediastinal adenopathy, POA  Reactive Leukocytosis , POA- resolved   Normocytic anemia , mild-POA  Microscopic Hematuria , POA  Carotid atherosclerotic disease   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   Seizure precaution   Ativan IV PRN for witnessed  seizures   Home meds are reviewed and resumed as appropriate   Plavix and Xarelto held for now   Pulmonology is consulted and thoracentesis planned today   Follow up pleural fluid study   PT/OT consult     VTE prophylaxis: Lovenox     Patient condition:  Fair     Anticipated discharge and Disposition:     TBD    All diagnosis and differential diagnosis have been reviewed; assessment and plan has been documented; I have personally reviewed the labs and test results that are presently available; I have reviewed the patients medication list; I have reviewed the consulting providers response and recommendations. I have reviewed or attempted to review medical records based upon their availability    All of the patient's questions have been  addressed and answered. Patient's is agreeable to the above stated plan. I will continue to monitor closely and make adjustments to medical management as needed.    Portions of this note dictated using EMR integrated voice recognition software, and may be subject to voice recognition errors not corrected at proofreading. Please contact writer for clarification if needed.   _____________________________________________________________________    Malnutrition Status:    Scheduled Med:   LIDOcaine HCL 10 mg/ml (1%)        chlorhexidine  15 mL Mouth/Throat BID    diltiaZEM  60 mg Oral Q12H    enoxparin  40 mg Subcutaneous Q24H (prophylaxis, 1700)    EScitalopram oxalate  5 mg Oral Daily    levETIRAcetam  500 mg Oral BID    memantine  10 mg Oral BID    mupirocin   Nasal BID    pantoprazole  40 mg Oral BID AC    tamsulosin  1 capsule Oral Daily      Continuous Infusions:   0.9% NaCl   Intravenous Code/Trauma/sedation Continuous Med   Stopped at 09/19/24 2014      PRN Meds:    Current Facility-Administered Medications:     LIDOcaine HCL 10 mg/ml (1%), , ,     0.9% NaCl, , Intravenous, Code/Trauma/sedation Continuous Med    HYDROcodone-acetaminophen, 1 tablet, Oral, Q6H PRN    lorazepam, 2  mg, Intravenous, Q15 Min PRN    LORazepam, 0.5 mg, Oral, Q12H PRN    sodium chloride 0.9%, 10 mL, Intravenous, PRN         Matias Palacio MD  Department of Hospital Medicine   [facility]   09/21/2024

## 2024-09-21 NOTE — PROCEDURES
"Gerald J Lejeune is a 79 y.o. male patient.    Temp: 98.6 °F (37 °C) (24 1200)  Pulse: 78 (24 1330)  Resp: (!) 22 (24 1330)  BP: 126/82 (24 1200)  SpO2: (!) 91 % (24 1330)  Weight: 87.9 kg (193 lb 12.6 oz) (24 0121)  Height: 5' 8" (172.7 cm) (24 0538)       Thoracentesis    Date/Time: 2024 2:25 PM  Location procedure was performed: [facility]    Performed by: Piyush Zayas MD  Authorized by: Piyush Zayas MD  Pre-operative diagnosis: Right pleural effusion  Post-operative diagnosis: Same  Consent Done: Yes  Consent: Verbal consent obtained. Written consent obtained.  Risks and benefits: risks, benefits and alternatives were discussed  Consent given by: patient  Patient understanding: patient states understanding of the procedure being performed  Patient consent: the patient's understanding of the procedure matches consent given  Relevant documents: relevant documents present and verified  Test results: test results available and properly labeled  Site marked: the operative site was marked  Imaging studies: imaging studies available  Patient identity confirmed: , MRN, name, provided demographic data and verbally with patient  Time out: Immediately prior to procedure a "time out" was called to verify the correct patient, procedure, equipment, support staff and site/side marked as required.  Procedure purpose: diagnostic and therapeutic  Indications: pleural effusion  Preparation: Patient was prepped and draped in the usual sterile fashion.  Local anesthesia used: yes  Anesthesia: local infiltration    Anesthesia:  Local anesthesia used: yes  Local Anesthetic: lidocaine 1% without epinephrine (20)  Anesthetic total: 20 mL    Patient sedated: no  Preparation: skin prepped with ChloraPrep  Patient position: sitting  Ultrasound guidance: yes  Location: right posterior  Intercostal space: 6th  Puncture method: over-the-needle catheter  Needle size: " 20  Catheter size: 5 Trin[place].  Number of attempts: 1  Drainage amount: 950 ml  Drainage characteristics: cloudy and serosanguinous (turbid)  Patient tolerance: Patient tolerated the procedure well with no immediate complications  Chest x-ray performed: no  Complications: No  Estimated blood loss (mL): 0  Specimens: No  Implants: No  Drainage Tube: removed        9/21/2024

## 2024-09-21 NOTE — NURSING
Nurses Note -- 4 Eyes      9/21/2024   8:38 AM      Skin assessed during: Daily Assessment      [] No Altered Skin Integrity Present    [x]Prevention Measures Documented      [x] Yes- Altered Skin Integrity Present or Discovered   [x] LDA Added if Not in Epic (Describe Wound)   [] New Altered Skin Integrity was Present on Admit and Documented in LDA   [] Wound Image Taken    Wound Care Consulted? No    Attending Nurse:  Carter Gifford RN/Staff Member:   JUNG Dodson

## 2024-09-21 NOTE — CONSULTS
[facility] - [facility] ICU  Pulmonology  Consult Note    Patient Name: Gerald J Lejeune  MRN: [id number]  Admission Date: 9/19/2024  Hospital Length of Stay: 2 days  Code Status: Full Code  Attending Physician: Matias Palacio MD  Primary Care Provider: Shlomo Leach MD   Principal Problem: Seizure      Subjective:     Reason for Consult:  Right pleural effusion, bulky mediastinal adenopathy    HPI   Gerald J Lejeune is a 79 y.o. male with a pmh of HTN, HLD, PAD hx stent 2019, COPD, and former tobacco use, who presented to [facility] ED as a transfer from [place] on 9/19/2024 for stroke like symptoms. Patient's daughter at bedside to provide additional history of events. Patient's girlfriend noticed the patient staring off into the distance with rightward gaze at approx 8:15 on 9/18/2024. Patient was reportedly unresponsive for a short time following by confused. EMS was called and patient had a seizure en route to hospital in [place] which was treated with versed. Upon arrival to [place] ED, patent was intubated for GCS of 3. Outside hospital did not have CT Scanner in operation, so patient was transferred to [facility]. Upon arrival to [facility], ED physician reports patient was intubated, awake, following commands in all extremities (hx of R BKA), and negative for gaze palsy or neglect. CT Head and CTA H&N were negative. Following discussion with Neuro, decision was made to defer TNK as patient was outside of window after midnight and exam/imaging were not consistent with stroke. ICU consulted for admission as patient remains intubated. No additional seizure like activity observed. Family denies a history of seizure disorder.     His most specific complaint after much questioning is of shortness a breath ongoing over the past year with gradual progression with significant dyspnea on exertion after ambulating roughly 10-20 feet.  He was not on home oxygen or any other devices for respiratory failure.  He denies  significant cough hemoptysis sputum production fevers chills sweats or unexpected weight loss.      The patient quit smoking 1 year ago and was smoking up to 3 packs per day for roughly 50 years.  He denies any excessive alcohol usage or illicit drug use.  He denies any history of hazardous exposures.  He denies any previous history of malignancy      No past medical history on file.      No past surgical history on file.      No family history on file.      Social History     Socioeconomic History    Marital status:      Social Determinants of Health     Financial Resource Strain: Patient Unable To Answer (9/20/2024)    Overall Financial Resource Strain (CARDIA)     Difficulty of Paying Living Expenses: Patient unable to answer   Food Insecurity: Patient Unable To Answer (9/20/2024)    Hunger Vital Sign     Worried About Running Out of Food in the Last Year: Patient unable to answer     Ran Out of Food in the Last Year: Patient unable to answer   Transportation Needs: Patient Unable To Answer (9/20/2024)    TRANSPORTATION NEEDS     Transportation : Patient unable to answer   Stress: Patient Unable To Answer (9/20/2024)    [employer] - Occupational Stress Questionnaire     Feeling of Stress : Patient unable to answer   Housing Stability: Patient Unable To Answer (9/20/2024)    Housing Stability Vital Sign     Unable to Pay for Housing in the Last Year: Patient unable to answer     Homeless in the Last Year: Patient unable to answer         Review of patient's allergies indicates:  No Known Allergies      Current Outpatient Medications   Medication Instructions    clopidogreL (PLAVIX) 75 mg, Oral, Daily    diltiaZEM (CARDIZEM) 120 MG tablet 1 tablet, Oral, Daily    EScitalopram oxalate (LEXAPRO) 5 mg, Oral, Daily    fluticasone propionate (FLONASE) 50 mcg/actuation nasal spray 1 spray, Each Nostril, Daily    HYDROcodone-acetaminophen (NORCO)  mg per tablet 1 tablet, Oral,  Every 6 hours PRN    ipratropium (ATROVENT) 42 mcg (0.06 %) nasal spray 2 sprays, Each Nostril, 3 times daily    LORazepam (ATIVAN) 0.5 mg, Oral, Every 12 hours PRN    memantine (NAMENDA) 10 mg, Oral, 2 times daily    tamsulosin (FLOMAX) 0.4 mg Cap 1 capsule, Oral, Daily    XARELTO 20 mg, Oral, Daily         Scheduled Medications:    LIDOcaine HCL 10 mg/ml (1%)        chlorhexidine  15 mL Mouth/Throat BID    diltiaZEM  60 mg Oral Q12H    enoxparin  40 mg Subcutaneous Q24H (prophylaxis, 1700)    EScitalopram oxalate  5 mg Oral Daily    levETIRAcetam  500 mg Oral BID    memantine  10 mg Oral BID    mupirocin   Nasal BID    pantoprazole  40 mg Oral BID AC    tamsulosin  1 capsule Oral Daily         PRN Medications:     Current Facility-Administered Medications:     LIDOcaine HCL 10 mg/ml (1%), , ,     0.9% NaCl, , Intravenous, Code/Trauma/sedation Continuous Med    HYDROcodone-acetaminophen, 1 tablet, Oral, Q6H PRN    lorazepam, 2 mg, Intravenous, Q15 Min PRN    LORazepam, 0.5 mg, Oral, Q12H PRN    sodium chloride 0.9%, 10 mL, Intravenous, PRN      Infusions:     0.9% NaCl   Intravenous Code/Trauma/sedation Continuous Med   Stopped at 09/19/24 2014           Review of Systems   Constitutional:  Negative for chills, diaphoresis, fever, malaise/fatigue and weight loss.   HENT:  Negative for congestion, ear discharge, ear pain, hearing loss, nosebleeds and sore throat.    Eyes:  Negative for blurred vision, double vision, pain, discharge and redness.   Respiratory:  Positive for shortness of breath. Negative for cough, hemoptysis, sputum production, wheezing and stridor.    Cardiovascular:  Negative for chest pain, palpitations, orthopnea, claudication, leg swelling and PND.   Gastrointestinal:  Negative for abdominal pain, blood in stool, constipation, diarrhea, heartburn, melena, nausea and vomiting.   Genitourinary:  Negative for dysuria and hematuria.   Musculoskeletal:  Negative for back pain, joint pain, myalgias  and neck pain.   Skin:  Negative for itching and rash.   Neurological:  Negative for dizziness, focal weakness, weakness and headaches.   Endo/Heme/Allergies:  Does not bruise/bleed easily.           Objective:     Vital Signs (Most Recent):  Temp: 98.6 °F (37 °C) (09/21/24 1200)  Pulse: 78 (09/21/24 1330)  Resp: (!) 22 (09/21/24 1330)  BP: 126/82 (09/21/24 1200)  SpO2: (!) 91 % (09/21/24 1330) Vital Signs (24h Range):  Temp:  [98.1 °F (36.7 °C)-98.6 °F (37 °C)] 98.6 °F (37 °C)  Pulse:  [66-80] 78  Resp:  [15-31] 22  SpO2:  [89 %-97 %] 91 %  BP: (122-155)/(73-92) 126/82     Body mass index is 29.46 kg/m².      Fluid Balance:     Intake/Output Summary (Last 24 hours) at 9/21/2024 1436  Last data filed at 9/21/2024 1300  Gross per 24 hour   Intake 311.53 ml   Output 2275 ml   Net -1963.47 ml           Physical Exam  Vitals and nursing note reviewed.   Constitutional:       General: He is not in acute distress.     Appearance: Normal appearance. He is not ill-appearing or toxic-appearing.   HENT:      Head: Normocephalic and atraumatic.      Right Ear: External ear normal.      Left Ear: External ear normal.      Nose: Nose normal.      Mouth/Throat:      Mouth: Mucous membranes are moist.      Pharynx: Oropharynx is clear. No oropharyngeal exudate or posterior oropharyngeal erythema.   Eyes:      General: No scleral icterus.     Extraocular Movements: Extraocular movements intact.      Conjunctiva/sclera: Conjunctivae normal.      Pupils: Pupils are equal, round, and reactive to light.   Neck:      Vascular: No carotid bruit.   Cardiovascular:      Rate and Rhythm: Normal rate and regular rhythm.      Pulses: Normal pulses.      Heart sounds: Normal heart sounds. No murmur heard.     No friction rub. No gallop.   Pulmonary:      Effort: Pulmonary effort is normal. No respiratory distress.      Breath sounds: No wheezing, rhonchi or rales.      Comments: Absent breath sounds in the right chest with dullness to  "percussion of the entire right chest  Abdominal:      General: Abdomen is flat. Bowel sounds are normal. There is no distension.      Palpations: Abdomen is soft.      Tenderness: There is no abdominal tenderness. There is no guarding or rebound.   Genitourinary:     Comments: deferred  Musculoskeletal:         General: No swelling or deformity. Normal range of motion.      Cervical back: Normal range of motion and neck supple. No rigidity or tenderness.   Lymphadenopathy:      Cervical: No cervical adenopathy.   Skin:     General: Skin is warm and dry.      Capillary Refill: Capillary refill takes less than 2 seconds.      Coloration: Skin is not jaundiced.      Findings: No bruising, lesion or rash.   Neurological:      General: No focal deficit present.      Mental Status: He is alert.      Sensory: No sensory deficit.      Motor: No weakness.   Psychiatric:         Mood and Affect: Mood normal.           Laboratory Studies:       No results for input(s): "PH", "PCO2", "PO2", "HCO3", "POCSATURATED", "BE" in the last 24 hours.      Recent Labs   Lab 09/21/24  0249   WBC 9.83   RBC 4.04*   HGB 12.2*   HCT 36.4*      MCV 90.1   MCH 30.2   MCHC 33.5         Recent Labs   Lab 09/21/24  0249   GLUCOSE 94      K 3.4*      CO2 27   BUN 10.2   CREATININE 0.72*   CALCIUM 8.6*   MG 2.00         Microbiology Data:   Microbiology Results (last 7 days)       Procedure Component Value Units Date/Time    Gram Stain [[id number]]     Order Status: Sent Specimen: Body Fluid from Pleural Fluid     Mycobacteria and Nocardia Culture [[id number]]     Order Status: Sent Specimen: Body Fluid from Thoracentesis Fluid     Body Fluid Culture [[id number]]     Order Status: Sent Specimen: Body Fluid from Thoracentesis Fluid     KOH Prep [[id number]]     Order Status: Sent Specimen: Body Fluid from Pleural Fluid     Fungal Culture [[id number]]     Order Status: Sent Specimen: Body Fluid from Pleural Fluid       "         Imaging reviewed:  CT Chest Without Contrast  EXAMINATION  CT CHEST WITHOUT CONTRAST    CLINICAL HISTORY  Lymphadenopathy, chest or axilla;Lareg Rt pleural effusion with collapse and consolidation;    TECHNIQUE  Non-contrast helical-acquisition CT images were obtained and multiplanar reformats accomplished by a CT technologist at a separate workstation, pushed to PACS for physician review.    COMPARISON  None available at the time of initial interpretation.    FINDINGS  Images were reviewed in lung, soft tissue, and bone windows.    Exam quality: Overall adequate for evaluation, although non-contrast protocol limits assessment of the regional vascular structures and visualized solid organs.    Lines/tubes: none visualized    Heart chambers are normal volume.  No significant pericardial fluid.  Widespread coronary and aortic calcification is present.  There is no aneurysmal dilatation.    Patency of the trachea and left central bronchi widely maintained.  There is partial occlusion versus mucous plugging of the distal right mainstem bronchus, with near-complete consolidative collapse of the right lung.  Moderate right pleural effusion is also evident.  No organized left lung consolidation is identified.  There is no pneumothorax.    Numerous large mediastinal lymph nodes are present.  For example, precarinal node measures approximately 2 cm short axis (series 4, image 35); subcarinal node measures 2.1 cm in least dimension (image 51).    Thyroid gland is unremarkable.  There is no definite acute abnormality of the included upper abdomen.  Approximately 4 mm nonobstructive right upper pole nephrolithiasis is incidentally identified.  No adrenal nodule is evident.    Degenerative alterations present throughout the visualized spinal column.  No acute or destructive osseous process is evident.    IMPRESSION  1. Right near obstructive mucous plugging versus endobronchial lesion with almost complete consolidative  "collapse of the right lung.  Underlying malignancy is of concern given associated mediastinal adenopathy.  Superimposed infectious process not excluded.  2. Incidental nonobstructing right nephrolithiasis.  3. Additional chronic secondary findings discussed above.    RADIATION DOSE  Automated tube current modulation, weight-based exposure dosing, and/or iterative reconstruction technique utilized to reach lowest reasonably achievable exposure rate.    DLP: 491 mGy*cm    Electronically signed by: Mp Suárez  Date:    09/20/2024  Time:    20:44        All imaging from the past 24 hours personally reviewed.        2D ECHO Results    No results found in the last 24 hours.       Pulmonary Functions Testing Results:    No results found for: "FEV1", "FVC", "RQB4TFK", "TLC", "DLCO"        Assessment/Plan:     Assessment  Right pleural effusion   Bulky mediastinal adenopathy  Acute hypoxemic respiratory failure   Tobacco abuse history       Plan  -plan for right-sided thoracentesis today and will send for appropriate studies   -will need endobronchial ultrasound biopsy of bulky mediastinal adenopathy, suspicion for primary lung cancer very high given his tobacco abuse history  -supplement oxygen to maintain saturation 88-92%   -will need 6 minute walk test prior to discharge as he most likely has chronic respiratory failure with hypoxia given his prolonged history of shortness a breath over the past year with progression       Piyush Zayas MD  Pulmonology  [facility] - [address] [place] [address]      "

## 2024-09-22 PROCEDURE — 99900031 HC PATIENT EDUCATION (STAT)

## 2024-09-22 PROCEDURE — 11000001 HC ACUTE MED/SURG PRIVATE ROOM

## 2024-09-22 PROCEDURE — 27000221 HC OXYGEN, UP TO 24 HOURS

## 2024-09-22 PROCEDURE — 63600175 PHARM REV CODE 636 W HCPCS: Performed by: INTERNAL MEDICINE

## 2024-09-22 PROCEDURE — 25000003 PHARM REV CODE 250: Performed by: INTERNAL MEDICINE

## 2024-09-22 PROCEDURE — 97162 PT EVAL MOD COMPLEX 30 MIN: CPT

## 2024-09-22 PROCEDURE — 25000003 PHARM REV CODE 250: Performed by: STUDENT IN AN ORGANIZED HEALTH CARE EDUCATION/TRAINING PROGRAM

## 2024-09-22 PROCEDURE — 99900035 HC TECH TIME PER 15 MIN (STAT)

## 2024-09-22 PROCEDURE — 94760 N-INVAS EAR/PLS OXIMETRY 1: CPT

## 2024-09-22 RX ADMIN — MUPIROCIN: 20 OINTMENT TOPICAL at 08:09

## 2024-09-22 RX ADMIN — MUPIROCIN: 20 OINTMENT TOPICAL at 09:09

## 2024-09-22 RX ADMIN — MEMANTINE 10 MG: 5 TABLET ORAL at 08:09

## 2024-09-22 RX ADMIN — ESCITALOPRAM OXALATE 5 MG: 5 TABLET, FILM COATED ORAL at 08:09

## 2024-09-22 RX ADMIN — LEVOFLOXACIN 750 MG: 500 TABLET, FILM COATED ORAL at 01:09

## 2024-09-22 RX ADMIN — LEVETIRACETAM 500 MG: 500 TABLET, FILM COATED ORAL at 08:09

## 2024-09-22 RX ADMIN — TAMSULOSIN HYDROCHLORIDE 0.4 MG: 0.4 CAPSULE ORAL at 08:09

## 2024-09-22 RX ADMIN — MEMANTINE 10 MG: 5 TABLET ORAL at 09:09

## 2024-09-22 RX ADMIN — CHLORHEXIDINE GLUCONATE 0.12% ORAL RINSE 15 ML: 1.2 LIQUID ORAL at 08:09

## 2024-09-22 RX ADMIN — DILTIAZEM HYDROCHLORIDE 60 MG: 60 TABLET, FILM COATED ORAL at 09:09

## 2024-09-22 RX ADMIN — LEVETIRACETAM 500 MG: 500 TABLET, FILM COATED ORAL at 09:09

## 2024-09-22 RX ADMIN — DILTIAZEM HYDROCHLORIDE 60 MG: 60 TABLET, FILM COATED ORAL at 08:09

## 2024-09-22 RX ADMIN — PANTOPRAZOLE SODIUM 40 MG: 40 TABLET, DELAYED RELEASE ORAL at 04:09

## 2024-09-22 RX ADMIN — CHLORHEXIDINE GLUCONATE 0.12% ORAL RINSE 15 ML: 1.2 LIQUID ORAL at 09:09

## 2024-09-22 RX ADMIN — ENOXAPARIN SODIUM 40 MG: 40 INJECTION SUBCUTANEOUS at 04:09

## 2024-09-22 RX ADMIN — PANTOPRAZOLE SODIUM 40 MG: 40 TABLET, DELAYED RELEASE ORAL at 05:09

## 2024-09-22 NOTE — NURSING
Nurses Note -- 4 Eyes      9/22/2024   7:29 AM      Skin assessed during: Daily Assessment      [] No Altered Skin Integrity Present    [x]Prevention Measures Documented      [x] Yes- Altered Skin Integrity Present or Discovered   [x] LDA Added if Not in Epic (Describe Wound)   [] New Altered Skin Integrity was Present on Admit and Documented in LDA   [] Wound Image Taken    Wound Care Consulted? No    Attending Nurse:  Carter Gifford RN/Staff Member:  JUNG Juarez

## 2024-09-22 NOTE — PROGRESS NOTES
[facility]  Hospital Medicine Progress Note        Chief Complaint: Inpatient Follow-up for seizures     HPI:   The Pt  is a 79 y.o. male with a PMHx of essential hypertension, AAA, BPH, COPD, Home O2 at 3L/min, former heavy smoker 3 PPD x 50 years, stopped just a  year ago, PAD/PVD on Plavix and Xarelto s/p right BKA who presented to [facility] on 9/19/2024 via EMS as a transfer from [facility] for higher level of care.  He initially presented to the [facility] facility after family noticed a rightward gaze around 8:15 on 09/18/2024. He then went unresponsive, staring and body was stiffening and was turning blue. EMS were called and patient reportedly had a seizure EN route to the [facility] ED.  Upon arrival to the [facility] ED he was intubated for airway protection.  CT scan was unavailable therefore patient was transferred to [facility] for higher level of care. CT head was negative for acute intracranial abnormality.  CXR suggestive of large right-sided pleural effusion and extensive airspace consolidation.  CTA head/neck negative for LVO or flow-limiting stenosis, fusiform dilatation of the anterior communicating artery without definite saccular  aneurysms; carotid bulbs with 20-30% stenosis on the right 30 40% stenosis on the left; bulky mediastinal lymphadenopathy with right lung collapse and pleural effusion.  Labs were notable for WBC 12.25, hemoglobin 12, hematocrit 34.7, INR 1.8, chloride 108, calcium 7.4.  Hemoglobin A1c 5.  TSH normal.  UDS positive for benzodiazepines.  Neurology was consulted, thrombolytics or thrombectomy not recommended with low suspicion for acute stroke. Pt was started on IV Keppra 500 mg bid and  admitted to ICU on mechanical ventilation.  MRI brain with and without contrast on 09/19 negative for mass, metastatic disease or acute stroke. EEG unremarkable.  Echocardiogram with LVEF 45-50%, grade 1 diastolic dysfunction.  Tolerated extubation on  09/20/2024.  Cleared for downgrade to the floor on 09/20/2024 and care transferred to  service.     Post extubation, no further seizures reported. Pt's mental status returned to baseline. A CT chest was done on 9/20/24 showed persistent large Rt pleural effusion with near obstructive mucous plugging versus endobronchial lesion with almost complete consolidative collapse of the right lung and associated mediastinal adenopathy  suspicious for underlying malignancy. Home Plavix and Xarelto held and Pulmonology was consulted.  Pt underwent Rt thoracentesis on 9/21 with removal of 950 ml cloudy and serosanguinous fluid exudative fluid with fluid pH 7.8, protein 3.6, Glucose 110, , Fluid WBC 1313 with neutrophil 35%, Fluid gram statin with no bacteria and culture neg for 24h as of 9/22/24. Course of oral Levofloxacin initiated to cover post obstructive pneumonia. Further Plan from  Pulmonology to follow for bronchoscopy and endobronchial ultrasound and biopsy.       Interval Hx:   S/P Rt thora yesterday.   Oxygen demand is stable at baseline 3L/min via NC  No distress noted. Hemodynamics are stable. Afebrile. Pt has no new c/o today.  No AM labs today.  Follow up CXR this morning with improved expansion of the right lung with residual perihilar and basilar opacities.     Case was discussed with patient's nurse and  on the floor.    Objective/physical exam:  General: In no acute distress, afebrile, On NC  Chest: Clear left lung. Decreased breath sounds Rt lung base  Heart: RRR, +S1, S2, no appreciable murmur  Abdomen: Soft, nontender, BS +  MSK: Warm, no left  lower extremity edema/ clubbing/cyanosis, Rt BKA  Neurologic: Alert and oriented. Moves ext spontaneously     VITAL SIGNS: 24 HRS MIN & MAX LAST   Temp  Min: 97.8 °F (36.6 °C)  Max: 98.6 °F (37 °C) 97.8 °F (36.6 °C)   BP  Min: 120/77  Max: 157/86 136/76   Pulse  Min: 47  Max: 80  67   Resp  Min: 10  Max: 32 20   SpO2  Min: 81 %  Max: 99 % 95 %      I have reviewed the following labs:  Recent Labs   Lab 09/19/24  0102 09/20/24  0507 09/21/24  0249   WBC 12.25* 9.40 9.83   RBC 3.88* 4.19* 4.04*   HGB 12.0* 12.9* 12.2*   HCT 34.7* 37.6* 36.4*   MCV 89.4 89.7 90.1   MCH 30.9 30.8 30.2   MCHC 34.6 34.3 33.5   RDW 12.3 12.6 12.4    217 207   MPV 10.4 10.8* 10.6*     Recent Labs   Lab 09/19/24  0102 09/19/24  0431 09/20/24  0500 09/20/24  0507 09/21/24  0249     --   --  143 141   K 3.5  --   --  3.8 3.4*   *  --   --  107 106   CO2 23  --   --  25 27   BUN 10.7  --   --  11.1 10.2   CREATININE 0.78  --   --  0.77 0.72*   CALCIUM 7.4*  --   --  8.7* 8.6*   PH  --  7.450 7.500*  --   --    MG  --   --   --  2.20 2.00   ALBUMIN 2.4*  --   --  2.6* 2.7*   ALKPHOS 59  --   --  73 75   ALT 11  --   --  10 12   AST 13  --   --  14 12   BILITOT 0.4  --   --  0.4 0.6     Microbiology Results (last 7 days)       Procedure Component Value Units Date/Time    Body Fluid Culture [[id number]] Collected: 09/21/24 1442    Order Status: Completed Specimen: Body Fluid from Thoracentesis Fluid Updated: 09/22/24 0639     Body Fluid Culture No Growth At 24 Hours    Gram Stain [[id number]] Collected: 09/21/24 1442    Order Status: Completed Specimen: Body Fluid from Pleural Fluid Updated: 09/21/24 1917     GRAM STAIN Moderate WBC observed      No bacteria seen    KOH Prep [[id number]] Collected: 09/21/24 1442    Order Status: Completed Specimen: Body Fluid from Pleural Fluid Updated: 09/21/24 1747     KOH Prep No fungal elements seen    Fungal Culture [[id number]] Collected: 09/21/24 1442    Order Status: Sent Specimen: Body Fluid from Pleural Fluid Updated: 09/21/24 1501    Mycobacteria and Nocardia Culture [[id number]] Collected: 09/21/24 1442    Order Status: Sent Specimen: Body Fluid from Thoracentesis Fluid Updated: 09/21/24 1501             See below for Radiology    Assessment/Plan:  New onset seizure disorder - unclear etiology, POA  Acute on chronic  hypoxic/hypercapnic resp failure during ictal phase - S/P intubation and extubation   Rt lung collapse due to mucous plugging versus endobronchial lesion , POA  Rt pleural effusion and associated bulky mediastinal adenopathy, POA- s/p Rt thoracentesis on 9/21/24  Reactive Leukocytosis , POA- resolved   Normocytic anemia , mild-POA  Microscopic Hematuria , POA  Carotid atherosclerotic disease   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   Continue Keppra 500 mg bid.   Seizure precaution   Ativan IV PRN for witnessed seizures   Home meds are reviewed and resumed as appropriate   Plavix and Xarelto are being held for now    SLP/PT/OT consulted and follow recs      VTE prophylaxis: Lovenox      Patient condition:  Fair      Anticipated discharge and Disposition:     TBD          All diagnosis and differential diagnosis have been reviewed; assessment and plan has been documented; I have personally reviewed the labs and test results that are presently available; I have reviewed the patients medication list; I have reviewed the consulting providers response and recommendations. I have reviewed or attempted to review medical records based upon their availability    All of the patient's questions have been  addressed and answered. Patient's is agreeable to the above stated plan. I will continue to monitor closely and make adjustments to medical management as needed.    Portions of this note dictated using EMR integrated voice recognition software, and may be subject to voice recognition errors not corrected at proofreading. Please contact writer for  clarification if needed.   _____________________________________________________________________    Malnutrition Status:    Scheduled Med:   chlorhexidine  15 mL Mouth/Throat BID    diltiaZEM  60 mg Oral Q12H    enoxparin  40 mg Subcutaneous Q24H (prophylaxis, 1700)    EScitalopram oxalate  5 mg Oral Daily    levETIRAcetam  500 mg Oral BID    memantine  10 mg Oral BID    mupirocin   Nasal BID    pantoprazole  40 mg Oral BID AC    tamsulosin  1 capsule Oral Daily      Continuous Infusions:   0.9% NaCl   Intravenous Code/Trauma/sedation Continuous Med   Stopped at 09/19/24 2014      PRN Meds:    Current Facility-Administered Medications:     0.9% NaCl, , Intravenous, Code/Trauma/sedation Continuous Med    HYDROcodone-acetaminophen, 1 tablet, Oral, Q6H PRN    lorazepam, 2 mg, Intravenous, Q15 Min PRN    LORazepam, 0.5 mg, Oral, Q12H PRN    sodium chloride 0.9%, 10 mL, Intravenous, PRN         Matias Palacio MD  Department of Hospital Medicine   [facility]   09/22/2024

## 2024-09-22 NOTE — PLAN OF CARE
Problem: Physical Therapy  Goal: Physical Therapy Goal  Description: Goals to be met by: 10/22/24     Patient will increase functional independence with mobility by performin. Supine to sit with Modified [place]  2. Sit to supine with Modified [place]  3. Sit to stand transfer with Modified [place]  4. Gait  x 200 feet with Modified [place] using Rolling Walker.     Outcome: Progressing

## 2024-09-22 NOTE — NURSING
Nurses Note -- 4 Eyes      9/22/2024   5:00 AM      Skin assessed during: Daily Assessment      [] No Altered Skin Integrity Present    [x]Prevention Measures Documented      [x] Yes- Altered Skin Integrity Present or Discovered   [] LDA Added if Not in Epic (Describe Wound)   [] New Altered Skin Integrity was Present on Admit and Documented in LDA   [] Wound Image Taken    Wound Care Consulted? No    Attending Nurse:  Azul Gifford RN/Staff Member:   Pepito FENG

## 2024-09-22 NOTE — PT/OT/SLP EVAL
Physical Therapy Evaluation    Patient Name:  Gerald J Lejeune   MRN:  [id number]    Recommendations:     Discharge therapy intensity: Low Intensity Therapy   Discharge Equipment Recommendations: none   Barriers to discharge:  medical dx, impaired mobility, decreased independence     Assessment:     Gerald J Lejeune is a 79 y.o. male admitted with a medical diagnosis of seizure, intubated, large R pleural effusion s/p thoracentesis. Hx R BKA and dementia.   He presents with the following impairments/functional limitations: gait instability, weakness, decreased upper extremity function, impaired cardiopulmonary response to activity, impaired endurance, impaired balance, decreased lower extremity function, impaired self care skills, impaired functional mobility.  Patient tolerates PT eval fairly well. Pt requires Iker for sit<>stand form bedside chair and toilet. Pt able to ambulate a short distance with use of RW and CGA. Pt with mild signs and symptoms of respiratory distress; poor probe reading to get accurate number after ambulation. Will continue progressing as able.     Rehab Prognosis: Good; patient would benefit from acute skilled PT services to address these deficits and reach maximum level of function.    Recent Surgery: * No surgery found *      Plan:     During this hospitalization, patient would benefit from acute PT services 5 x/week to address the identified rehab impairments via gait training, therapeutic activities, therapeutic exercises and progress toward the following goals:    Plan of Care Expires:  10/22/24    Subjective     Chief Complaint: n/a  Patient/Family Comments/goals: to go home  Pain/Comfort:  Pain Rating 1: 0/10    Patients cultural, spiritual, Scientologist conflicts given the current situation: no    Living Environment:  Pt lives with friend, Haven. 2 steps to enter/exit without railings   Prior to admission, patients level of function was Viv with transfers and activity.    Equipment  used at home: cane, straight, walker, rolling.  DME owned (not currently used): bedside commode, shower chair, and wheelchair.    Upon discharge, patient will have assistance from friend.    Objective:     Communicated with NSG prior to session.  Patient found up in chair with blood pressure cuff, pulse ox (continuous), telemetry, oxygen, peripheral IV  upon PT entry to room.    General Precautions: Standard, fall  Orthopedic Precautions:N/A   Braces:  (prosthetic for R LE)  Respiratory Status: Nasal cannula, flow 3 L/min; SpO2: 88-91% at rest, unable to get an accurate reading after activity   Blood Pressure: 130/76  HR: 70      Exams:  Cognitive Exam:  Patient is oriented to Person, Place (knew [facility], but thought he was in [place]), and Time  RLE Strength: WFL except that of remaining stump  LLE Strength: WFL  Skin integrity: Visible skin intact    *R LE prosthetic donned prior to activity   Functional Mobility:  Transfers:     Sit to Stand:  minimum assistance with rolling walker  Toilet Transfer: minimum assistance with  rolling walker; + void  Gait: pt ambulates approx 80 ft with use of RW and CGA; pt demo a slow, step through pattern; mild SOB; noted increased WOB; no LOB       Treatment & Education:  Patient provided with verbal education regarding PT role/goals/POC, fall prevention, safety awareness, and discharge/DME recommendations.  Understanding was verbalized.     Patient left up in chair with all lines intact, call button in reach, and RN notified.    GOALS:   Multidisciplinary Problems       Physical Therapy Goals          Problem: Physical Therapy    Goal Priority Disciplines Outcome Goal Variances Interventions   Physical Therapy Goal     PT, PT/OT Progressing     Description: Goals to be met by: 10/22/24     Patient will increase functional independence with mobility by performin. Supine to sit with Modified Boyd  2. Sit to supine with Modified Boyd  3. Sit to stand  transfer with Modified White  4. Gait  x 200 feet with Modified White using Rolling Walker.                          History:     No past medical history on file.    No past surgical history on file.    Time Tracking:     PT Received On: 09/22/24  PT Start Time: 0958     PT Stop Time: 1016  PT Total Time (min): 18 min     Billable Minutes: Evaluation mod      09/22/2024

## 2024-09-22 NOTE — PT/OT/SLP PROGRESS
[facility]  Spe[facility] Department  Diet Tolerance Follow-up    Patient Name:  Gerald J Lejeune   MRN:  [id number]  Admitting Diagnosis: seizures    Recommendations     General recommendations:  SLP intervention not indicated  Solid texture recommendation:  Soft & Bite Sized Diet - IDDSI Level 6  Liquid consistency recommendation: Thin liquids - IDDSI Level 0   Medications: per patient preference  Swallow strategies/precautions: small bites/sips, slow rate, and upright for PO intake  Precautions:  seizures    Diet Tolerance     Nursing reports no difficulty regarding diet tolerance.    Patient Education     No learner present/available.    Plan     SLP Follow-Up:  No    Plan of Care reviewed with:  patient       09/22/2024

## 2024-09-23 LAB
ANION GAP SERPL CALC-SCNC: 12 MEQ/L
BASOPHILS # BLD AUTO: 0.05 X10(3)/MCL
BASOPHILS NFR BLD AUTO: 0.5 %
BUN SERPL-MCNC: 9.7 MG/DL (ref 8.4–25.7)
CALCIUM SERPL-MCNC: 8.9 MG/DL (ref 8.8–10)
CHLORIDE SERPL-SCNC: 100 MMOL/L (ref 98–107)
CO2 SERPL-SCNC: 28 MMOL/L (ref 23–31)
CREAT SERPL-MCNC: 0.72 MG/DL (ref 0.73–1.18)
CREAT/UREA NIT SERPL: 13
EOSINOPHIL # BLD AUTO: 0 X10(3)/MCL (ref 0–0.9)
EOSINOPHIL NFR BLD AUTO: 0 %
ERYTHROCYTE [DISTWIDTH] IN BLOOD BY AUTOMATED COUNT: 12.2 % (ref 11.5–17)
GFR SERPLBLD CREATININE-BSD FMLA CKD-EPI: >60 ML/MIN/1.73/M2
GLUCOSE SERPL-MCNC: 93 MG/DL (ref 82–115)
HCT VFR BLD AUTO: 37.7 % (ref 42–52)
HGB BLD-MCNC: 12.7 G/DL (ref 14–18)
IMM GRANULOCYTES # BLD AUTO: 0.03 X10(3)/MCL (ref 0–0.04)
IMM GRANULOCYTES NFR BLD AUTO: 0.3 %
LYMPHOCYTES # BLD AUTO: 1.07 X10(3)/MCL (ref 0.6–4.6)
LYMPHOCYTES NFR BLD AUTO: 11.2 %
MAGNESIUM SERPL-MCNC: 1.8 MG/DL (ref 1.6–2.6)
MCH RBC QN AUTO: 30 PG (ref 27–31)
MCHC RBC AUTO-ENTMCNC: 33.7 G/DL (ref 33–36)
MCV RBC AUTO: 89.1 FL (ref 80–94)
MONOCYTES # BLD AUTO: 0.81 X10(3)/MCL (ref 0.1–1.3)
MONOCYTES NFR BLD AUTO: 8.5 %
NEUTROPHILS # BLD AUTO: 7.57 X10(3)/MCL (ref 2.1–9.2)
NEUTROPHILS NFR BLD AUTO: 79.5 %
NRBC BLD AUTO-RTO: 0 %
PHOSPHATE SERPL-MCNC: 2.9 MG/DL (ref 2.3–4.7)
PLATELET # BLD AUTO: 229 X10(3)/MCL (ref 130–400)
PMV BLD AUTO: 11 FL (ref 7.4–10.4)
POTASSIUM SERPL-SCNC: 4.4 MMOL/L (ref 3.5–5.1)
RBC # BLD AUTO: 4.23 X10(6)/MCL (ref 4.7–6.1)
SODIUM SERPL-SCNC: 140 MMOL/L (ref 136–145)
WBC # BLD AUTO: 9.53 X10(3)/MCL (ref 4.5–11.5)

## 2024-09-23 PROCEDURE — 27000221 HC OXYGEN, UP TO 24 HOURS

## 2024-09-23 PROCEDURE — 63600175 PHARM REV CODE 636 W HCPCS: Performed by: INTERNAL MEDICINE

## 2024-09-23 PROCEDURE — 85025 COMPLETE CBC W/AUTO DIFF WBC: CPT | Performed by: INTERNAL MEDICINE

## 2024-09-23 PROCEDURE — 83735 ASSAY OF MAGNESIUM: CPT | Performed by: INTERNAL MEDICINE

## 2024-09-23 PROCEDURE — 80048 BASIC METABOLIC PNL TOTAL CA: CPT | Performed by: INTERNAL MEDICINE

## 2024-09-23 PROCEDURE — 11000001 HC ACUTE MED/SURG PRIVATE ROOM

## 2024-09-23 PROCEDURE — 84100 ASSAY OF PHOSPHORUS: CPT | Performed by: INTERNAL MEDICINE

## 2024-09-23 PROCEDURE — 97116 GAIT TRAINING THERAPY: CPT

## 2024-09-23 PROCEDURE — 25000003 PHARM REV CODE 250: Performed by: STUDENT IN AN ORGANIZED HEALTH CARE EDUCATION/TRAINING PROGRAM

## 2024-09-23 PROCEDURE — 25000003 PHARM REV CODE 250: Performed by: INTERNAL MEDICINE

## 2024-09-23 PROCEDURE — 36415 COLL VENOUS BLD VENIPUNCTURE: CPT | Performed by: INTERNAL MEDICINE

## 2024-09-23 PROCEDURE — 99900031 HC PATIENT EDUCATION (STAT)

## 2024-09-23 PROCEDURE — 97166 OT EVAL MOD COMPLEX 45 MIN: CPT

## 2024-09-23 PROCEDURE — 99900035 HC TECH TIME PER 15 MIN (STAT)

## 2024-09-23 PROCEDURE — 94760 N-INVAS EAR/PLS OXIMETRY 1: CPT

## 2024-09-23 RX ADMIN — DILTIAZEM HYDROCHLORIDE 60 MG: 60 TABLET, FILM COATED ORAL at 08:09

## 2024-09-23 RX ADMIN — LEVETIRACETAM 500 MG: 500 TABLET, FILM COATED ORAL at 08:09

## 2024-09-23 RX ADMIN — PANTOPRAZOLE SODIUM 40 MG: 40 TABLET, DELAYED RELEASE ORAL at 04:09

## 2024-09-23 RX ADMIN — LEVOFLOXACIN 750 MG: 500 TABLET, FILM COATED ORAL at 08:09

## 2024-09-23 RX ADMIN — ESCITALOPRAM OXALATE 5 MG: 5 TABLET, FILM COATED ORAL at 08:09

## 2024-09-23 RX ADMIN — MEMANTINE 10 MG: 5 TABLET ORAL at 08:09

## 2024-09-23 RX ADMIN — MUPIROCIN: 20 OINTMENT TOPICAL at 08:09

## 2024-09-23 RX ADMIN — CHLORHEXIDINE GLUCONATE 0.12% ORAL RINSE 15 ML: 1.2 LIQUID ORAL at 08:09

## 2024-09-23 RX ADMIN — TAMSULOSIN HYDROCHLORIDE 0.4 MG: 0.4 CAPSULE ORAL at 08:09

## 2024-09-23 RX ADMIN — PANTOPRAZOLE SODIUM 40 MG: 40 TABLET, DELAYED RELEASE ORAL at 06:09

## 2024-09-23 RX ADMIN — ENOXAPARIN SODIUM 40 MG: 40 INJECTION SUBCUTANEOUS at 04:09

## 2024-09-23 NOTE — PLAN OF CARE
Problem: Occupational Therapy  Goal: Occupational Therapy Goal  Description: Goals to be met by: 10/23/24     Patient will increase functional independence with ADLs by performing:    UE Dressing with Supervision.  LE Dressing with Supervision.  Grooming while standing at sink with Supervision.  Toileting from toilet with Supervision for hygiene and clothing management.   Toilet transfer to toilet with Supervision.    Outcome: Progressing

## 2024-09-23 NOTE — PT/OT/SLP PROGRESS
Physical Therapy Treatment    Patient Name:  Gerald J Lejeune   MRN:  [id number]    Recommendations:     Discharge therapy intensity: Low Intensity Therapy   Discharge Equipment Recommendations: none  Barriers to discharge: Impaired mobility and Ongoing medical needs    Assessment:     Gerald J Lejeune is a 79 y.o. male admitted with a medical diagnosis of seizure, intubated, large R pleural effusion s/p thoracentesis. Hx R BKA and dementia.  He presents with the following impairments/functional limitations: weakness, impaired endurance, impaired self care skills, impaired functional mobility, gait instability, decreased lower extremity function, impaired cardiopulmonary response to activity.    Rehab Prognosis: Good; patient would benefit from acute skilled PT services to address these deficits and reach maximum level of function.    Recent Surgery: * No surgery found *      Plan:     During this hospitalization, patient would benefit from acute PT services 5 x/week to address the identified rehab impairments via gait training, therapeutic activities, therapeutic exercises and progress toward the following goals:    Plan of Care Expires:  10/22/24    Subjective     Chief Complaint: none stated  Patient/Family Comments/goals: to go home  Pain/Comfort:  Pain Rating 1: 0/10      Objective:     Communicated with NSG prior to session.  Patient found up in chair with peripheral IV, telemetry, oxygen, pulse ox (continuous), blood pressure cuff upon PT entry to room.     General Precautions: Standard, fall  Orthopedic Precautions: N/A  Braces:  (RLE prosthesis)  Respiratory Status: Nasal cannula, flow 4 L/min  Blood Pressure: 139/78  Skin Integrity: Visible skin intact      Functional Mobility:  Transfers:     Sit to Stand:  minimum assistance with rolling walker x2 from bedside chair  Gait: 110' x 2 with RW and CGA; demos slow, step through pattern; mild SOB; 2 instances of posterior lean with PT providing Iker for  recovery    Extra time required for rest breaks in between ambulation trials.       Education:  Patient, significant other were, and son/s were provided with verbal education education regarding PT role/goals/POC, fall prevention, safety awareness, and discharge/DME recommendations.  Understanding was verbalized.     Patient left up in chair with all lines intact, call button in reach, RN notified, and family present    GOALS:   Multidisciplinary Problems       Physical Therapy Goals          Problem: Physical Therapy    Goal Priority Disciplines Outcome Goal Variances Interventions   Physical Therapy Goal     PT, PT/OT Progressing     Description: Goals to be met by: 10/22/24     Patient will increase functional independence with mobility by performin. Supine to sit with Modified [place]  2. Sit to supine with Modified [place]  3. Sit to stand transfer with Modified [place]  4. Gait  x 200 feet with Modified [place] using Rolling Walker.                          Time Tracking:     PT Received On: 24  PT Start Time: 0940     PT Stop Time: 1004  PT Total Time (min): 24 min     Billable Minutes: Gait Training 2    Treatment Type: Treatment  PT/PTA: PT     Number of PTA visits since last PT visit: 0     2024

## 2024-09-23 NOTE — PROGRESS NOTES
[facility] - [facility] ICU  Pulmonology  Consult Note    Patient Name: Gerald J Lejeune  MRN: [id number]  Admission Date: 9/19/2024  Hospital Length of Stay: 4 days  Code Status: Full Code  Attending Physician: Matias Palacio MD  Primary Care Provider: Shlomo Leach MD   Principal Problem: Seizure      Subjective:     Reason for Consult:  Right pleural effusion, bulky mediastinal adenopathy    HPI   Gerald J Lejeune is a 79 y.o. male with a pmh of HTN, HLD, PAD hx stent 2019, COPD, and former tobacco use, who presented to [facility] ED as a transfer from [place] on 9/19/2024 for stroke like symptoms. Patient's daughter at bedside to provide additional history of events. Patient's girlfriend noticed the patient staring off into the distance with rightward gaze at approx 8:15 on 9/18/2024. Patient was reportedly unresponsive for a short time following by confused. EMS was called and patient had a seizure en route to hospital in [place] which was treated with versed. Upon arrival to [place] ED, patent was intubated for GCS of 3. Outside hospital did not have CT Scanner in operation, so patient was transferred to [facility]. Upon arrival to [facility], ED physician reports patient was intubated, awake, following commands in all extremities (hx of R BKA), and negative for gaze palsy or neglect. CT Head and CTA H&N were negative. Following discussion with Neuro, decision was made to defer TNK as patient was outside of window after midnight and exam/imaging were not consistent with stroke. ICU consulted for admission as patient remains intubated. No additional seizure like activity observed. Family denies a history of seizure disorder.     His most specific complaint after much questioning is of shortness a breath ongoing over the past year with gradual progression with significant dyspnea on exertion after ambulating roughly 10-20 feet.  He was not on home oxygen or any other devices for respiratory failure.  He denies  significant cough hemoptysis sputum production fevers chills sweats or unexpected weight loss.      The patient quit smoking 1 year ago and was smoking up to 3 packs per day for roughly 50 years.  He denies any excessive alcohol usage or illicit drug use.  He denies any history of hazardous exposures.  He denies any previous history of malignancy    INTERVAL HISTORY:  - breathing comfortably on 4 L of supplemental oxygen.  Pleural fluid studies consistent with a lymphocytic exudate.  Cytology pending.      No past medical history on file.      No past surgical history on file.      No family history on file.      Social History     Socioeconomic History    Marital status:      Social Determinants of Health     Financial Resource Strain: Patient Unable To Answer (9/20/2024)    Overall Financial Resource Strain (CARDIA)     Difficulty of Paying Living Expenses: Patient unable to answer   Food Insecurity: Patient Unable To Answer (9/20/2024)    Hunger Vital Sign     Worried About Running Out of Food in the Last Year: Patient unable to answer     Ran Out of Food in the Last Year: Patient unable to answer   Transportation Needs: Patient Unable To Answer (9/20/2024)    TRANSPORTATION NEEDS     Transportation : Patient unable to answer   Stress: Patient Unable To Answer (9/20/2024)    [employer] - Occupational Stress Questionnaire     Feeling of Stress : Patient unable to answer   Housing Stability: Patient Unable To Answer (9/20/2024)    Housing Stability Vital Sign     Unable to Pay for Housing in the Last Year: Patient unable to answer     Homeless in the Last Year: Patient unable to answer         Review of patient's allergies indicates:  No Known Allergies      Current Outpatient Medications   Medication Instructions    clopidogreL (PLAVIX) 75 mg, Oral, Daily    diltiaZEM (CARDIZEM) 120 MG tablet 1 tablet, Oral, Daily    EScitalopram oxalate (LEXAPRO) 5 mg, Oral, Daily     fluticasone propionate (FLONASE) 50 mcg/actuation nasal spray 1 spray, Each Nostril, Daily    HYDROcodone-acetaminophen (NORCO)  mg per tablet 1 tablet, Oral, Every 6 hours PRN    ipratropium (ATROVENT) 42 mcg (0.06 %) nasal spray 2 sprays, Each Nostril, 3 times daily    LORazepam (ATIVAN) 0.5 mg, Oral, Every 12 hours PRN    memantine (NAMENDA) 10 mg, Oral, 2 times daily    tamsulosin (FLOMAX) 0.4 mg Cap 1 capsule, Oral, Daily    XARELTO 20 mg, Oral, Daily         Scheduled Medications:    chlorhexidine  15 mL Mouth/Throat BID    diltiaZEM  60 mg Oral Q12H    enoxparin  40 mg Subcutaneous Q24H (prophylaxis, 1700)    EScitalopram oxalate  5 mg Oral Daily    levETIRAcetam  500 mg Oral BID    levoFLOXacin  750 mg Oral Daily    memantine  10 mg Oral BID    mupirocin   Nasal BID    pantoprazole  40 mg Oral BID AC    tamsulosin  1 capsule Oral Daily         PRN Medications:     Current Facility-Administered Medications:     0.9% NaCl, , Intravenous, Code/Trauma/sedation Continuous Med    HYDROcodone-acetaminophen, 1 tablet, Oral, Q6H PRN    lorazepam, 2 mg, Intravenous, Q15 Min PRN    LORazepam, 0.5 mg, Oral, Q12H PRN    sodium chloride 0.9%, 10 mL, Intravenous, PRN      Infusions:     0.9% NaCl   Intravenous Code/Trauma/sedation Continuous Med   Stopped at 09/19/24 2014           Review of Systems   Constitutional:  Negative for chills, diaphoresis, fever, malaise/fatigue and weight loss.   HENT:  Negative for congestion, ear discharge, ear pain, hearing loss, nosebleeds and sore throat.    Eyes:  Negative for blurred vision, double vision, pain, discharge and redness.   Respiratory:  Positive for shortness of breath. Negative for cough, hemoptysis, sputum production, wheezing and stridor.    Cardiovascular:  Negative for chest pain, palpitations, orthopnea, claudication, leg swelling and PND.   Gastrointestinal:  Negative for abdominal pain, blood in stool, constipation, diarrhea, heartburn, melena, nausea and  "vomiting.   Genitourinary:  Negative for dysuria and hematuria.   Musculoskeletal:  Negative for back pain, joint pain, myalgias and neck pain.   Skin:  Negative for itching and rash.   Neurological:  Negative for dizziness, focal weakness, weakness and headaches.   Endo/Heme/Allergies:  Does not bruise/bleed easily.           Objective:     Vital Signs (Most Recent):  Temp: 97.8 °F (36.6 °C) (09/23/24 1200)  Pulse: 76 (09/23/24 1400)  Resp: (!) 22 (09/23/24 1400)  BP: 122/71 (09/23/24 1400)  SpO2: 97 % (09/23/24 1400) Vital Signs (24h Range):  Temp:  [97.5 °F (36.4 °C)-98 °F (36.7 °C)] 97.8 °F (36.6 °C)  Pulse:  [70-81] 76  Resp:  [18-26] 22  SpO2:  [91 %-100 %] 97 %  BP: (122-139)/(60-86) 122/71     Body mass index is 29.46 kg/m².      Fluid Balance:     Intake/Output Summary (Last 24 hours) at 9/23/2024 1450  Last data filed at 9/23/2024 0500  Gross per 24 hour   Intake --   Output 2250 ml   Net -2250 ml           Physical Exam  Vitals reviewed.   Constitutional:       Appearance: Normal appearance.   Cardiovascular:      Rate and Rhythm: Normal rate and regular rhythm.   Pulmonary:      Comments: Breathing comfortably on 4 L supplemental oxygen.  Prolonged expiratory phase.  Neurological:      General: No focal deficit present.      Mental Status: He is alert.           Laboratory Studies:       No results for input(s): "PH", "PCO2", "PO2", "HCO3", "POCSATURATED", "BE" in the last 24 hours.      Recent Labs   Lab 09/23/24  0131   WBC 9.53   RBC 4.23*   HGB 12.7*   HCT 37.7*      MCV 89.1   MCH 30.0   MCHC 33.7         Recent Labs   Lab 09/23/24  0131   GLUCOSE 93      K 4.4      CO2 28   BUN 9.7   CREATININE 0.72*   CALCIUM 8.9   MG 1.80         Microbiology Data:   Microbiology Results (last 7 days)       Procedure Component Value Units Date/Time    Body Fluid Culture [[id number]] Collected: 09/21/24 1442    Order Status: Completed Specimen: Body Fluid from Thoracentesis Fluid Updated: " 09/23/24 0843     Body Fluid Culture No Growth At 48 Hours    Gram Stain [[id number]] Collected: 09/21/24 1442    Order Status: Completed Specimen: Body Fluid from Pleural Fluid Updated: 09/21/24 1917     GRAM STAIN Moderate WBC observed      No bacteria seen    KOH Prep [[id number]] Collected: 09/21/24 1442    Order Status: Completed Specimen: Body Fluid from Pleural Fluid Updated: 09/21/24 1747     KOH Prep No fungal elements seen    Fungal Culture [[id number]] Collected: 09/21/24 1442    Order Status: Sent Specimen: Body Fluid from Pleural Fluid Updated: 09/21/24 1501    Mycobacteria and Nocardia Culture [[id number]] Collected: 09/21/24 1442    Order Status: Sent Specimen: Body Fluid from Thoracentesis Fluid Updated: 09/21/24 1501              Imaging reviewed:  X-Ray Chest 1 View  EXAMINATION  XR CHEST 1 VIEW    CLINICAL HISTORY  Rt pleural effusion s/p thora;    TECHNIQUE  A total of 1 frontal image(s) of the chest.    COMPARISON  19 September 2024    FINDINGS  Lines/tubes/devices: Endotracheal and esophageal tubes are no longer present.  ECG leads again overlie the chest.    The cardiac silhouette and central vascular structures are unchanged.  The trachea is midline. There is notably increased aeration of the right lung, with residual perihilar and basilar opacities.  Small residual pleural effusion also of noted on the right.  No new or worsening left lung field abnormality.  No convincing pneumothorax.    Regional osseous structures and extrathoracic soft tissues are similar.    IMPRESSION  1. Endotracheal and esophageal extubation.  2. Improved expansion of the right lung with residual perihilar and basilar opacities.  3. Small residual right pleural effusion.    Electronically signed by: Mp Suárez  Date:    09/22/2024  Time:    09:44        All imaging from the past 24 hours personally reviewed.        2D ECHO Results    No results found in the last 24 hours.       Pulmonary Functions Testing  "Results:    No results found for: "FEV1", "FVC", "BGI4XQY", "TLC", "DLCO"        Assessment/Plan:     Assessment  Right pleural effusion   Bulky mediastinal adenopathy  Acute hypoxemic respiratory failure   Tobacco abuse history       Plan  Follow up pleural fluid cytology  If pleural fluid cytology is negative, plan for endobronchial ultrasound with possible biopsies.  This can be done as an inpatient or outpatient.  Continue to hold Plavix for now  Ambulatory oximetry prior to discharge    Pulmonary will continue to follow.       Dillan Kirkpatrick MD  Pulmonology  [facility] - [facility] ICU      "

## 2024-09-23 NOTE — PT/OT/SLP EVAL
"Occupational Therapy  Evaluation    Name: Gerald J Lejeune  MRN: [id number]  Admitting Diagnosis: seizure  Recent Surgery: * No surgery found *      Recommendations:     Discharge therapy intensity: Low Intensity Therapy   Discharge Equipment Recommendations:  none  Barriers to discharge:  none evident    Assessment:     Gerald J Lejeune is a 79 y.o. male with a medical diagnosis of new onset seizure disorder, acute on chronic hypoxic/hypercapnic resp failure, R lung collapse due to mucous plugging vs endobronchial lesion, R pleural effusion, anemia, hematuria, former heavy smoker.  He presents up in chair, agreeable.  He requires overall CGA-min A ADLs.  His significant other is able to assist him at discharge if needed.  Reports he has home O2 but has not been utilizing. Anticipate low intensity therapy at dc with support from his SO.  He presents with the following performance deficits affecting function: weakness, impaired functional mobility, impaired self care skills, decreased lower extremity function, impaired endurance.     Rehab Prognosis: Good; patient would benefit from acute skilled OT services to address these deficits and reach maximum level of function.       Plan:     Patient to be seen 4 x/week to address the above listed problems via self-care/home management, therapeutic activities  Plan of Care Expires: 10/23/24  Plan of Care Reviewed with: patient    Subjective     Chief Complaint: no complaints  Patient/Family Comments/goals: "go home and go to the car show"    Occupational Profile:  Living Environment: lives with significant other, art to enter, tub with bench, hand held shower, grab bars  Previous level of function: independent with ADLs, assist for driving, does not utilize AD for functional mobility, dons R prosthesis without assist, SO assists with med management  Roles and Routines: father, friend, significant other  Equipment Used at Home:  (wc, RW, cane, power scooter, " prosthesis)  Assistance upon Discharge: significant other    Pain/Comfort:  Pain Rating 1: 0/10    Patients cultural, spiritual, Yarsani conflicts given the current situation:      Objective:     OT communicated with RN prior to session.      Patient was found up in chair with  (vital monitoring) upon OT entry to room.    General Precautions: Standard, fall  Orthopedic Precautions: N/A  Braces:  (RLE prosthesis)    Vital Signs: 132/82 3.4 L NC, 96% O2 saturation    Functional Mobility/Transfers:  Patient completed Sit <> Stand Transfer with contact guard assistance  with  rolling walker   Patient completed Toilet Transfer Step Transfer technique with minimum assistance with  rolling walker  Functional Mobility: min A sit<>stand from low toilet, CGA overall with functional mobility with use of RW    Activities of Daily Living:  Grooming: CGA standing at sink for handwashing  Toileting: minimum assistance         Functional Cognition:  Oriented to self, follows commands  Hx of dementia    Visual Perceptual Skills:  Intact    Upper Extremity Function:  Right Upper Extremity:   WFL    Left Upper Extremity:  WFL    Balance:   Intact    Therapeutic Positioning  Risk for acquired pressure injuries is decreased due to ability to get to BSC/toilet with assist.    OT interventions performed during the course of today's session:   Education was provided on benefits of and recommendations for therapeutic positioning    Skin assessment: all bony prominences were assessed    Findings: no redness or breakdown noted    OT recommendations for therapeutic positioning throughout hospitalization:   Follow [facility] Pressure Injury Prevention Protocol        Patient Education:  Patient provided with verbal education education regarding OT role/goals/POC, fall prevention, safety awareness, and Discharge/DME recommendations.  Understanding was verbalized, however additional teaching warranted.     Patient left up in chair with all lines  intact, call button in reach, and SO present.    GOALS:   Multidisciplinary Problems       Occupational Therapy Goals          Problem: Occupational Therapy    Goal Priority Disciplines Outcome Interventions   Occupational Therapy Goal     OT, PT/OT Progressing    Description: Goals to be met by: 10/23/24     Patient will increase functional independence with ADLs by performing:    UE Dressing with Supervision.  LE Dressing with Supervision.  Grooming while standing at sink with Supervision.  Toileting from toilet with Supervision for hygiene and clothing management.   Toilet transfer to toilet with Supervision.                         History:     No past medical history on file.    No past surgical history on file.    Time Tracking:     OT Date of Treatment:    OT Start Time: 1102  OT Stop Time: 1125  OT Total Time (min): 23 min    Billable Minutes:Evaluation MOD    9/23/2024

## 2024-09-23 NOTE — PROGRESS NOTES
[facility]  Hospital Medicine Progress Note        Chief Complaint: Inpatient Follow-up for seizures     HPI:   The Pt  is a 79 y.o. male with a PMHx of essential hypertension, AAA, BPH, COPD, Home O2 at 3L/min, former heavy smoker 3 PPD x 50 years, stopped just a  year ago, PAD/PVD on Plavix and Xarelto s/p right BKA who presented to [facility] on 9/19/2024 via EMS as a transfer from [facility] for higher level of care.  He initially presented to the [facility] facility after family noticed a rightward gaze around 8:15 on 09/18/2024. He then went unresponsive, staring and body was stiffening and was turning blue. EMS were called and patient reportedly had a seizure EN route to the [facility] ED.  Upon arrival to the [facility] ED he was intubated for airway protection.  CT scan was unavailable therefore patient was transferred to [facility] for higher level of care. CT head was negative for acute intracranial abnormality.  CXR suggestive of large right-sided pleural effusion and extensive airspace consolidation.  CTA head/neck negative for LVO or flow-limiting stenosis, fusiform dilatation of the anterior communicating artery without definite saccular  aneurysms; carotid bulbs with 20-30% stenosis on the right 30 40% stenosis on the left; bulky mediastinal lymphadenopathy with right lung collapse and pleural effusion.  Labs were notable for WBC 12.25, hemoglobin 12, hematocrit 34.7, INR 1.8, chloride 108, calcium 7.4.  Hemoglobin A1c 5.  TSH normal.  UDS positive for benzodiazepines.  Neurology was consulted, thrombolytics or thrombectomy not recommended with low suspicion for acute stroke. Pt was started on IV Keppra 500 mg bid and  admitted to ICU on mechanical ventilation.  MRI brain with and without contrast on 09/19 negative for mass, metastatic disease or acute stroke. EEG unremarkable.  Echocardiogram with LVEF 45-50%, grade 1 diastolic dysfunction.  Tolerated extubation on  09/20/2024.  Cleared for downgrade to the floor on 09/20/2024 and care transferred to  service.     Post extubation, no further seizures reported. Pt's mental status returned to baseline. A CT chest was done on 9/20/24 showed persistent large Rt pleural effusion with near obstructive mucous plugging versus endobronchial lesion with almost complete consolidative collapse of the right lung and associated mediastinal adenopathy  suspicious for underlying malignancy. Home Plavix and Xarelto held and Pulmonology was consulted.  Pt underwent Rt thoracentesis on 9/21 with removal of 950 ml cloudy and serosanguinous fluid exudative fluid with fluid pH 7.8, protein 3.6, Glucose 110, , Fluid WBC 1313 with neutrophil 35%, Fluid gram statin with no bacteria and culture neg for 24h as of 9/22/24. Course of oral Levofloxacin initiated to cover post obstructive pneumonia. Further Plan from  Pulmonology to follow for bronchoscopy and endobronchial ultrasound and biopsy.      Interval Hx:   No acute event reported overnight.  No further seizure reported since admission.  Pt has baseline dementia and cognitive impairment. Currently oriented to self but disoriented to situation.     Afebrile. Hemodynamics are stable. O2 demand is stable at 4L/min. Uses 3L at home   Labs today showed WBC 9.5. Hgb 12.7, Cr 0.7, Mg 1.8, Phos 2.9    We are waiting for next plan from Pulmonology        Case was discussed with patient's nurse and  on the floor.    Objective/physical exam:  General: In no acute distress, afebrile, On NC  Chest: Clear left lung. Decreased breath sounds Rt lung base  Heart: RRR, +S1, S2, no appreciable murmur  Abdomen: Soft, nontender, BS +  MSK: Warm, no left  lower extremity edema/ clubbing/cyanosis, Rt BKA  Neurologic: Alert and oriented. Moves ext spontaneously     VITAL SIGNS: 24 HRS MIN & MAX LAST   Temp  Min: 97.5 °F (36.4 °C)  Max: 98 °F (36.7 °C) 97.8 °F (36.6 °C)   BP  Min: 122/60  Max: 139/78  136/74   Pulse  Min: 70  Max: 81  74   Resp  Min: 18  Max: 29 19   SpO2  Min: 91 %  Max: 100 % (!) 94 %     I have reviewed the following labs:  Recent Labs   Lab 09/20/24  0507 09/21/24  0249 09/23/24  0131   WBC 9.40 9.83 9.53   RBC 4.19* 4.04* 4.23*   HGB 12.9* 12.2* 12.7*   HCT 37.6* 36.4* 37.7*   MCV 89.7 90.1 89.1   MCH 30.8 30.2 30.0   MCHC 34.3 33.5 33.7   RDW 12.6 12.4 12.2    207 229   MPV 10.8* 10.6* 11.0*     Recent Labs   Lab 09/19/24  0102 09/19/24  0431 09/20/24  0500 09/20/24  0507 09/21/24 0249 09/23/24 0131     --   --  143 141 140   K 3.5  --   --  3.8 3.4* 4.4   *  --   --  107 106 100   CO2 23  --   --  25 27 28   BUN 10.7  --   --  11.1 10.2 9.7   CREATININE 0.78  --   --  0.77 0.72* 0.72*   CALCIUM 7.4*  --   --  8.7* 8.6* 8.9   PH  --  7.450 7.500*  --   --   --    MG  --   --   --  2.20 2.00 1.80   ALBUMIN 2.4*  --   --  2.6* 2.7*  --    ALKPHOS 59  --   --  73 75  --    ALT 11  --   --  10 12  --    AST 13  --   --  14 12  --    BILITOT 0.4  --   --  0.4 0.6  --      Microbiology Results (last 7 days)       Procedure Component Value Units Date/Time    Body Fluid Culture [[id number]] Collected: 09/21/24 1442    Order Status: Completed Specimen: Body Fluid from Thoracentesis Fluid Updated: 09/23/24 0843     Body Fluid Culture No Growth At 48 Hours    Gram Stain [[id number]] Collected: 09/21/24 1442    Order Status: Completed Specimen: Body Fluid from Pleural Fluid Updated: 09/21/24 1917     GRAM STAIN Moderate WBC observed      No bacteria seen    KOH Prep [[id number]] Collected: 09/21/24 1442    Order Status: Completed Specimen: Body Fluid from Pleural Fluid Updated: 09/21/24 1747     KOH Prep No fungal elements seen    Fungal Culture [[id number]] Collected: 09/21/24 1442    Order Status: Sent Specimen: Body Fluid from Pleural Fluid Updated: 09/21/24 1501    Mycobacteria and Nocardia Culture [[id number]] Collected: 09/21/24 1442    Order Status: Sent Specimen: Body Fluid  from Thoracentesis Fluid Updated: 09/21/24 1501             See below for Radiology    Assessment/Plan:  New onset seizure disorder - unclear etiology, POA  Acute on chronic hypoxic/hypercapnic resp failure during ictal phase - S/P intubation and extubation   Rt lung collapse due to mucous plugging versus endobronchial lesion , POA  Rt pleural effusion and associated bulky mediastinal adenopathy, POA- s/p Rt thoracentesis on 9/21/24  Reactive Leukocytosis , POA- resolved   Normocytic anemia , mild-POA  Microscopic Hematuria , POA  Carotid atherosclerotic disease   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   Continue Keppra 500 mg bid.   Seizure precaution   Ativan IV PRN for witnessed seizures   Home meds are reviewed and resumed as appropriate   Plavix and Xarelto are being held for now    SLP/PT/OT consulted and follow recs      VTE prophylaxis: Lovenox      Patient condition:  Fair      Anticipated discharge and Disposition:     Home with Family/Home Health               All diagnosis and differential diagnosis have been reviewed; assessment and plan has been documented; I have personally reviewed the labs and test results that are presently available; I have reviewed the patients medication list; I have reviewed the consulting providers response and recommendations. I have reviewed or attempted to review medical records based upon their availability    All of the patient's questions have been  addressed and answered. Patient's is agreeable to the above stated plan. I will continue to monitor closely and make adjustments to medical management as  needed.    Portions of this note dictated using EMR integrated voice recognition software, and may be subject to voice recognition errors not corrected at proofreading. Please contact writer for clarification if needed.   _____________________________________________________________________    Malnutrition Status:    Scheduled Med:   chlorhexidine  15 mL Mouth/Throat BID    diltiaZEM  60 mg Oral Q12H    enoxparin  40 mg Subcutaneous Q24H (prophylaxis, 1700)    EScitalopram oxalate  5 mg Oral Daily    levETIRAcetam  500 mg Oral BID    levoFLOXacin  750 mg Oral Daily    memantine  10 mg Oral BID    mupirocin   Nasal BID    pantoprazole  40 mg Oral BID AC    tamsulosin  1 capsule Oral Daily      Continuous Infusions:   0.9% NaCl   Intravenous Code/Trauma/sedation Continuous Med   Stopped at 09/19/24 2014      PRN Meds:    Current Facility-Administered Medications:     0.9% NaCl, , Intravenous, Code/Trauma/sedation Continuous Med    HYDROcodone-acetaminophen, 1 tablet, Oral, Q6H PRN    lorazepam, 2 mg, Intravenous, Q15 Min PRN    LORazepam, 0.5 mg, Oral, Q12H PRN    sodium chloride 0.9%, 10 mL, Intravenous, PRN     Radiology:  I have personally reviewed the following imaging and agree with the radiologist.     X-Ray Chest 1 View  EXAMINATION  XR CHEST 1 VIEW    CLINICAL HISTORY  Rt pleural effusion s/p thora;    TECHNIQUE  A total of 1 frontal image(s) of the chest.    COMPARISON  19 September 2024    FINDINGS  Lines/tubes/devices: Endotracheal and esophageal tubes are no longer present.  ECG leads again overlie the chest.    The cardiac silhouette and central vascular structures are unchanged.  The trachea is midline. There is notably increased aeration of the right lung, with residual perihilar and basilar opacities.  Small residual pleural effusion also of noted on the right.  No new or worsening left lung field abnormality.  No convincing pneumothorax.    Regional osseous structures and extrathoracic soft tissues  are similar.    IMPRESSION  1. Endotracheal and esophageal extubation.  2. Improved expansion of the right lung with residual perihilar and basilar opacities.  3. Small residual right pleural effusion.    Electronically signed by: Mp Suárez  Date:    09/22/2024  Time:    09:44      Matias Palacio MD  Department of Hospital Medicine   [facility]   09/23/2024

## 2024-09-24 VITALS
RESPIRATION RATE: 18 BRPM | HEART RATE: 74 BPM | TEMPERATURE: 97 F | SYSTOLIC BLOOD PRESSURE: 139 MMHG | WEIGHT: 193.81 LBS | HEIGHT: 68 IN | BODY MASS INDEX: 29.37 KG/M2 | DIASTOLIC BLOOD PRESSURE: 85 MMHG | OXYGEN SATURATION: 97 %

## 2024-09-24 PROCEDURE — 25000003 PHARM REV CODE 250: Performed by: INTERNAL MEDICINE

## 2024-09-24 PROCEDURE — 97116 GAIT TRAINING THERAPY: CPT

## 2024-09-24 PROCEDURE — 25000003 PHARM REV CODE 250: Performed by: STUDENT IN AN ORGANIZED HEALTH CARE EDUCATION/TRAINING PROGRAM

## 2024-09-24 RX ORDER — LEVOFLOXACIN 750 MG/1
750 TABLET ORAL DAILY
Qty: 3 TABLET | Refills: 0 | Status: SHIPPED | OUTPATIENT
Start: 2024-09-25 | End: 2024-09-28

## 2024-09-24 RX ORDER — LEVETIRACETAM 500 MG/1
500 TABLET ORAL 2 TIMES DAILY
Qty: 180 TABLET | Refills: 0 | Status: SHIPPED | OUTPATIENT
Start: 2024-09-24 | End: 2024-12-23

## 2024-09-24 RX ADMIN — ESCITALOPRAM OXALATE 5 MG: 5 TABLET, FILM COATED ORAL at 09:09

## 2024-09-24 RX ADMIN — PANTOPRAZOLE SODIUM 40 MG: 40 TABLET, DELAYED RELEASE ORAL at 06:09

## 2024-09-24 RX ADMIN — TAMSULOSIN HYDROCHLORIDE 0.4 MG: 0.4 CAPSULE ORAL at 08:09

## 2024-09-24 RX ADMIN — DILTIAZEM HYDROCHLORIDE 60 MG: 60 TABLET, FILM COATED ORAL at 08:09

## 2024-09-24 RX ADMIN — LEVOFLOXACIN 750 MG: 500 TABLET, FILM COATED ORAL at 08:09

## 2024-09-24 RX ADMIN — CHLORHEXIDINE GLUCONATE 0.12% ORAL RINSE 15 ML: 1.2 LIQUID ORAL at 08:09

## 2024-09-24 RX ADMIN — LEVETIRACETAM 500 MG: 500 TABLET, FILM COATED ORAL at 08:09

## 2024-09-24 RX ADMIN — LORAZEPAM 0.5 MG: 0.5 TABLET ORAL at 03:09

## 2024-09-24 RX ADMIN — MEMANTINE 10 MG: 5 TABLET ORAL at 08:09

## 2024-09-24 NOTE — NURSING
Discharge instructions reviewed with patient and patients wife. Medications reviewed with pt at bedside. Medications in pt possession at time of discharge.

## 2024-09-24 NOTE — PT/OT/SLP PROGRESS
Physical Therapy Treatment    Patient Name:  Gerald J Lejeune   MRN:  [id number]    Recommendations:     Discharge therapy intensity: Low Intensity Therapy   Discharge Equipment Recommendations: none  Barriers to discharge: Ongoing medical needs    Assessment:     Gerald J Lejeune is a 79 y.o. male admitted with a medical diagnosis of seizure, intubated, large R pleural effusion s/p thoracentesis. Hx R BKA and dementia.  He presents with the following impairments/functional limitations: weakness, impaired endurance, impaired self care skills, impaired functional mobility, gait instability, decreased lower extremity function, impaired cardiopulmonary response to activity.    Rehab Prognosis: Good; patient would benefit from acute skilled PT services to address these deficits and reach maximum level of function.    Recent Surgery: * No surgery found *      Plan:     During this hospitalization, patient would benefit from acute PT services 5 x/week to address the identified rehab impairments via gait training, therapeutic activities, therapeutic exercises and progress toward the following goals:    Plan of Care Expires:  10/22/24    Subjective     Chief Complaint: none stated   Patient/Family Comments/goals: return home  Pain/Comfort:  Pain Rating 1: 0/10      Objective:     Communicated with RN prior to session.  Patient found up in chair with oxygen, peripheral IV, telemetry, pulse ox (continuous) upon PT entry to room.     General Precautions: Standard, fall  Orthopedic Precautions: N/A  Braces: N/A  Respiratory Status: Nasal cannula, flow 4 L/min  Blood Pressure: NT  Skin Integrity: Visible skin intact      Functional Mobility:  Transfers:     Sit to Stand:  stand by assistance with rolling walker  Gait: Pt amb 115ft SBA with RW demo steady step through gait pattern. No LOB noted.     Therapeutic Activities/Exercises:    Education:  Patient and family were provided with verbal education education regarding PT  role/goals/POC, fall prevention, safety awareness, and discharge/DME recommendations.  Understanding was verbalized.     Patient left up in chair with all lines intact, call button in reach, and family present    GOALS:   Multidisciplinary Problems       Physical Therapy Goals          Problem: Physical Therapy    Goal Priority Disciplines Outcome Goal Variances Interventions   Physical Therapy Goal     PT, PT/OT Progressing     Description: Goals to be met by: 10/22/24     Patient will increase functional independence with mobility by performin. Supine to sit with Modified [place]  2. Sit to supine with Modified [place]  3. Sit to stand transfer with Modified [place]  4. Gait  x 200 feet with Modified [place] using Rolling Walker.                          Time Tracking:     PT Received On: 24  PT Start Time: 40     PT Stop Time: 50  PT Total Time (min): 10 min     Billable Minutes: Gait Training 10    Treatment Type: Treatment  PT/PTA: PT     Number of PTA visits since last PT visit: 2024

## 2024-09-24 NOTE — PLAN OF CARE
09/24/24 0944   Final Note   Assessment Type Final Discharge Note   Anticipated Discharge Disposition Home-Health   Hospital Resources/Appts/Education Provided Post-Acute resouces added to AVS   Post-Acute Status   Post-Acute Authorization Home Health   Home Health Status Set-up Complete/Auth obtained   Patient choice form signed by patient/caregiver List with quality metrics by geographic area provided   Discharge Delays None known at this time     Spoke to patient about home health. [place] of choice signed. Referral sent to [facility] and accepted.

## 2024-09-24 NOTE — PLAN OF CARE
Problem: Adult Inpatient Plan of Care  Goal: Patient-Specific Goal (Individualized)  Outcome: Progressing  Goal: Absence of Hospital-Acquired Illness or Injury  Outcome: Progressing  Goal: Optimal Comfort and Wellbeing  Outcome: Progressing     Problem: Fall Injury Risk  Goal: Absence of Fall and Fall-Related Injury  Outcome: Progressing     Problem: Wound  Goal: Optimal Coping  Outcome: Met  Goal: Optimal Functional Ability  Outcome: Met  Goal: Absence of Infection Signs and Symptoms  Outcome: Met  Goal: Improved Oral Intake  Outcome: Met  Goal: Optimal Pain Control and Function  Outcome: Met  Goal: Optimal Wound Healing  Outcome: Met

## 2024-09-24 NOTE — CARE UPDATE
Pulmonary Note    Agree with note from Dr. Pacheco.  Please make NPO at midnight and hold prophylactic anticoagulation in preparation for EBUS with biopsies on 9/25.

## 2024-09-24 NOTE — NURSING
Nurses Note -- 4 Eyes      9/24/2024   2:31 AM      Skin assessed during: Transfer      [] No Altered Skin Integrity Present    [x]Prevention Measures Documented      [x] Yes- Altered Skin Integrity Present or Discovered   [] LDA Added if Not in Epic (Describe Wound)   [] New Altered Skin Integrity was Present on Admit and Documented in LDA   [] Wound Image Taken    Wound Care Consulted? No    Attending Nurse:  Law Gifford RN/Staff Member: Marya

## 2024-09-24 NOTE — PROGRESS NOTES
[facility] - [facility] North ICU  Pulmonology  Consult Note    Patient Name: Gerald J Lejeune  MRN: [id number]  Admission Date: 9/19/2024  Hospital Length of Stay: 5 days  Code Status: Full Code  Attending Physician: Matias Palacio MD  Primary Care Provider: Shlomo Leach MD   Principal Problem: Seizure      Subjective:     Reason for Consult:  Right pleural effusion, bulky mediastinal adenopathy    HPI   Gerald J Lejeune is a 79 y.o. male with a pmh of HTN, HLD, PAD hx stent 2019, COPD, and former tobacco use, who presented to [facility] ED as a transfer from [place] on 9/19/2024 for stroke like symptoms. Patient's daughter at bedside to provide additional history of events. Patient's girlfriend noticed the patient staring off into the distance with rightward gaze at approx 8:15 on 9/18/2024. Patient was reportedly unresponsive for a short time following by confused. EMS was called and patient had a seizure en route to hospital in [place] which was treated with versed. Upon arrival to [place] ED, patent was intubated for GCS of 3. Outside hospital did not have CT Scanner in operation, so patient was transferred to [facility]. Upon arrival to [facility], ED physician reports patient was intubated, awake, following commands in all extremities (hx of R BKA), and negative for gaze palsy or neglect. CT Head and CTA H&N were negative. Following discussion with Neuro, decision was made to defer TNK as patient was outside of window after midnight and exam/imaging were not consistent with stroke. ICU consulted for admission as patient remains intubated. No additional seizure like activity observed. Family denies a history of seizure disorder.     His most specific complaint after much questioning is of shortness a breath ongoing over the past year with gradual progression with significant dyspnea on exertion after ambulating roughly 10-20 feet.  He was not on home oxygen or any other devices for respiratory failure.  He denies  significant cough hemoptysis sputum production fevers chills sweats or unexpected weight loss.      The patient quit smoking 1 year ago and was smoking up to 3 packs per day for roughly 50 years.  He denies any excessive alcohol usage or illicit drug use.  He denies any history of hazardous exposures.  He denies any previous history of malignancy    INTERVAL HISTORY:  --he is status post thoracentesis on 9/21  -- breathing comfortably on 4 L of supplemental oxygen.  Pleural fluid studies consistent with a lymphocytic exudate.  Cytology pending.      Social History     Socioeconomic History    Marital status:      Social Determinants of Health     Financial Resource Strain: Patient Unable To Answer (9/20/2024)    Overall Financial Resource Strain (CARDIA)     Difficulty of Paying Living Expenses: Patient unable to answer   Food Insecurity: Patient Unable To Answer (9/20/2024)    Hunger Vital Sign     Worried About Running Out of Food in the Last Year: Patient unable to answer     Ran Out of Food in the Last Year: Patient unable to answer   Transportation Needs: Patient Unable To Answer (9/20/2024)    TRANSPORTATION NEEDS     Transportation : Patient unable to answer   Stress: Patient Unable To Answer (9/20/2024)    [employer] [place] [employer] - Occupational Stress Questionnaire     Feeling of Stress : Patient unable to answer   Housing Stability: Patient Unable To Answer (9/20/2024)    Housing Stability Vital Sign     Unable to Pay for Housing in the Last Year: Patient unable to answer     Homeless in the Last Year: Patient unable to answer         Review of patient's allergies indicates:  No Known Allergies      Current Outpatient Medications   Medication Instructions    clopidogreL (PLAVIX) 75 mg, Oral, Daily    diltiaZEM (CARDIZEM) 120 MG tablet 1 tablet, Oral, Daily    EScitalopram oxalate (LEXAPRO) 5 mg, Oral, Daily    fluticasone propionate (FLONASE) 50 mcg/actuation nasal spray 1 spray,  Each Nostril, Daily    HYDROcodone-acetaminophen (NORCO)  mg per tablet 1 tablet, Oral, Every 6 hours PRN    ipratropium (ATROVENT) 42 mcg (0.06 %) nasal spray 2 sprays, Each Nostril, 3 times daily    levETIRAcetam (KEPPRA) 500 mg, Oral, 2 times daily    [START ON 9/25/2024] levoFLOXacin (LEVAQUIN) 750 mg, Oral, Daily    LORazepam (ATIVAN) 0.5 mg, Oral, Every 12 hours PRN    memantine (NAMENDA) 10 mg, Oral, 2 times daily    tamsulosin (FLOMAX) 0.4 mg Cap 1 capsule, Oral, Daily    XARELTO 20 mg, Oral, Daily         Scheduled Medications:    chlorhexidine  15 mL Mouth/Throat BID    diltiaZEM  60 mg Oral Q12H    enoxparin  40 mg Subcutaneous Q24H (prophylaxis, 1700)    EScitalopram oxalate  5 mg Oral Daily    levETIRAcetam  500 mg Oral BID    levoFLOXacin  750 mg Oral Daily    memantine  10 mg Oral BID    mupirocin   Nasal BID    pantoprazole  40 mg Oral BID AC    tamsulosin  1 capsule Oral Daily         PRN Medications:     Current Facility-Administered Medications:     0.9% NaCl, , Intravenous, Code/Trauma/sedation Continuous Med    HYDROcodone-acetaminophen, 1 tablet, Oral, Q6H PRN    lorazepam, 2 mg, Intravenous, Q15 Min PRN    LORazepam, 0.5 mg, Oral, Q12H PRN    sodium chloride 0.9%, 10 mL, Intravenous, PRN      Infusions:     0.9% NaCl   Intravenous Code/Trauma/sedation Continuous Med   Stopped at 09/19/24 2014           Review of Systems   Constitutional:  Negative for chills, diaphoresis, fever, malaise/fatigue and weight loss.   HENT:  Negative for congestion, ear discharge, ear pain, hearing loss, nosebleeds and sore throat.    Eyes:  Negative for blurred vision, double vision, pain, discharge and redness.   Respiratory:  Positive for shortness of breath. Negative for cough, hemoptysis, sputum production, wheezing and stridor.    Cardiovascular:  Negative for chest pain, palpitations, orthopnea, claudication, leg swelling and PND.   Gastrointestinal:  Negative for abdominal pain, blood in stool,  "constipation, diarrhea, heartburn, melena, nausea and vomiting.   Genitourinary:  Negative for dysuria and hematuria.   Musculoskeletal:  Negative for back pain, joint pain, myalgias and neck pain.   Skin:  Negative for itching and rash.   Neurological:  Negative for dizziness, focal weakness, weakness and headaches.   Endo/Heme/Allergies:  Does not bruise/bleed easily.           Objective:     Vital Signs (Most Recent):  Temp: 97.4 °F (36.3 °C) (09/24/24 0735)  Pulse: 74 (09/24/24 0735)  Resp: 18 (09/24/24 0735)  BP: 139/85 (09/24/24 0735)  SpO2: 97 % (09/24/24 0735) Vital Signs (24h Range):  Temp:  [97.4 °F (36.3 °C)-98.5 °F (36.9 °C)] 97.4 °F (36.3 °C)  Pulse:  [74-88] 74  Resp:  [16-27] 18  SpO2:  [92 %-97 %] 97 %  BP: (122-152)/(69-90) 139/85     Body mass index is 29.46 kg/m².      Fluid Balance:     Intake/Output Summary (Last 24 hours) at 9/24/2024 0925  Last data filed at 9/24/2024 0518  Gross per 24 hour   Intake 150 ml   Output 1020 ml   Net -870 ml           Physical Exam  Vitals reviewed.   Constitutional:       Appearance: Normal appearance.   Cardiovascular:      Rate and Rhythm: Normal rate and regular rhythm.   Pulmonary:      Comments: Breathing comfortably on 4 L supplemental oxygen.  Prolonged expiratory phase.  Neurological:      General: No focal deficit present.      Mental Status: He is alert.           Laboratory Studies:       No results for input(s): "PH", "PCO2", "PO2", "HCO3", "POCSATURATED", "BE" in the last 24 hours.      No results for input(s): "WBC", "RBC", "HGB", "HCT", "PLT", "MCV", "MCH", "MCHC" in the last 24 hours.        No results for input(s): "GLUCOSE", "NA", "K", "CL", "CO2", "BUN", "CREATININE", "CALCIUM", "MG" in the last 24 hours.        Microbiology Data:   Microbiology Results (last 7 days)       Procedure Component Value Units Date/Time    Body Fluid Culture [[id number]] Collected: 09/21/24 1442    Order Status: Completed Specimen: Body Fluid from Thoracentesis Fluid " Updated: 09/24/24 0739     Body Fluid Culture No Growth At 72 Hours    Gram Stain [[id number]] Collected: 09/21/24 1442    Order Status: Completed Specimen: Body Fluid from Pleural Fluid Updated: 09/21/24 1917     GRAM STAIN Moderate WBC observed      No bacteria seen    KOH Prep [[id number]] Collected: 09/21/24 1442    Order Status: Completed Specimen: Body Fluid from Pleural Fluid Updated: 09/21/24 1747     KOH Prep No fungal elements seen    Fungal Culture [[id number]] Collected: 09/21/24 1442    Order Status: Sent Specimen: Body Fluid from Pleural Fluid Updated: 09/21/24 1501    Mycobacteria and Nocardia Culture [[id number]] Collected: 09/21/24 1442    Order Status: Sent Specimen: Body Fluid from Thoracentesis Fluid Updated: 09/21/24 1501              Imaging reviewed:  X-Ray Chest 1 View  EXAMINATION  XR CHEST 1 VIEW    CLINICAL HISTORY  Rt pleural effusion s/p thora;    TECHNIQUE  A total of 1 frontal image(s) of the chest.    COMPARISON  19 September 2024    FINDINGS  Lines/tubes/devices: Endotracheal and esophageal tubes are no longer present.  ECG leads again overlie the chest.    The cardiac silhouette and central vascular structures are unchanged.  The trachea is midline. There is notably increased aeration of the right lung, with residual perihilar and basilar opacities.  Small residual pleural effusion also of noted on the right.  No new or worsening left lung field abnormality.  No convincing pneumothorax.    Regional osseous structures and extrathoracic soft tissues are similar.    IMPRESSION  1. Endotracheal and esophageal extubation.  2. Improved expansion of the right lung with residual perihilar and basilar opacities.  3. Small residual right pleural effusion.    Electronically signed by: Mp Suárez  Date:    09/22/2024  Time:    09:44        All imaging from the past 24 hours personally reviewed.        2D ECHO Results    No results found in the last 24 hours.       Pulmonary Functions Testing  "Results:    No results found for: "FEV1", "FVC", "XAR8OHR", "TLC", "DLCO"        Assessment/Plan:     Assessment  Right pleural effusion   Bulky mediastinal adenopathy  Acute hypoxemic respiratory failure   Tobacco abuse history   New onset seizures      Plan  Follow up pleural fluid cytology  If pleural fluid cytology is negative, plan for endobronchial ultrasound with possible biopsies.  This can be done as an inpatient or outpatient.  Continue to hold Plavix for now  Ambulatory oximetry prior to discharge    Pulmonary will continue to follow.       CLEMENTINA Campos  Pulmonology      "

## 2024-09-24 NOTE — NURSING
Nurses Note -- 4 Eyes      9/24/2024   7:26 AM      Skin assessed during: Q Shift Change      [x] No Altered Skin Integrity Present    []Prevention Measures Documented      [] Yes- Altered Skin Integrity Present or Discovered   [] LDA Added if Not in Epic (Describe Wound)   [] New Altered Skin Integrity was Present on Admit and Documented in LDA   [] Wound Image Taken    No wounds visualized     Wound Care Consulted? No    Attending Nurse:  Joanna Gifford RN/Staff Member:  Law

## 2024-09-25 ENCOUNTER — PATIENT OUTREACH (OUTPATIENT)
Dept: ADMINISTRATIVE | Facility: CLINIC | Age: 79
End: 2024-09-25
Payer: MEDICARE

## 2024-09-25 LAB — PSYCHE PATHOLOGY RESULT: NORMAL

## 2024-09-25 NOTE — PROGRESS NOTES
C3 nurse spoke with Gerald J Lejeune 's daughter, Ann Marie  for a TCC post hospital discharge follow up call. The patient has a scheduled [facility] appointment with  Shlomo Leach MD (Family Medicine) on Monday September 30, 2024 @ 2:45 pm

## 2024-09-26 LAB — BACTERIA FLD CULT: NORMAL

## 2024-09-29 NOTE — DISCHARGE SUMMARY
[facility]  Hospital Medicine Discharge Summary    Admit Date: 9/19/2024  Discharge Date and Time: 9/24/2024, 12:06 pm  Admitting Physician:  Team  Discharging Physician: Matias Palacio MD.  Primary Care Physician: Shlomo Leach MD  Consults: Neurology and Pulmonary/Intensive care    Discharge Diagnoses:  New onset seizure disorder - unclear etiology, POA  Acute on chronic hypoxic/hypercapnic resp failure during ictal phase - S/P intubation and extubation   Rt lung collapse due to mucous plugging versus endobronchial lesion , POA- Bronchoscopy and EBUS with biopsies planned as outpatient per Pulmonology  Rt pleural effusion and associated bulky mediastinal adenopathy, POA- s/p Rt thoracentesis on 9/21/24- Cytology was pending at the time of discharge   Reactive Leukocytosis , POA- resolved   Normocytic anemia , mild-POA  Microscopic Hematuria , POA  Carotid atherosclerotic disease   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 male with a PMHx of essential hypertension, AAA, BPH, COPD, Home O2 at 3L/min, former heavy smoker 3 PPD x 50 years, stopped just a  year ago, PAD/PVD on Plavix and Xarelto s/p right BKA who presented to [facility] on 9/19/2024 via EMS as a transfer from [facility] for higher level of care.  He initially presented to the [facility] facility after family noticed a rightward gaze around 8:15 on 09/18/2024. He then went unresponsive, staring and body was stiffening and was turning blue. EMS were called and patient reportedly had a seizure EN route to the [facility] ED.  Upon arrival to the [facility] ED he was intubated for airway protection.  CT scan was unavailable therefore patient was transferred to [facility] for higher level of care. CT head was negative  for acute intracranial abnormality.  CXR suggestive of large right-sided pleural effusion and extensive airspace consolidation.  CTA head/neck negative for LVO or flow-limiting stenosis, fusiform dilatation of the anterior communicating artery without definite saccular  aneurysms; carotid bulbs with 20-30% stenosis on the right 30 40% stenosis on the left; bulky mediastinal lymphadenopathy with right lung collapse and pleural effusion.  Labs were notable for WBC 12.25, hemoglobin 12, hematocrit 34.7, INR 1.8, chloride 108, calcium 7.4.  Hemoglobin A1c 5.  TSH normal.  UDS positive for benzodiazepines.  Neurology was consulted, thrombolytics or thrombectomy not recommended with low suspicion for acute stroke. Pt was started on IV Keppra 500 mg bid and  admitted to ICU on mechanical ventilation.  MRI brain with and without contrast on 09/19 negative for mass, metastatic disease or acute stroke. EEG unremarkable.  Echocardiogram with LVEF 45-50%, grade 1 diastolic dysfunction.  Tolerated extubation on 09/20/2024.  Cleared for downgrade to the floor on 09/20/2024 and care transferred to  service.     Post extubation, no further seizures reported. Pt's mental status returned to baseline. A CT chest was done on 9/20/24 showed persistent large Rt pleural effusion with near obstructive mucous plugging versus endobronchial lesion with almost complete consolidative collapse of the right lung and associated mediastinal adenopathy  suspicious for underlying malignancy. Home Plavix and Xarelto held and Pulmonology was consulted.  Pt underwent Rt thoracentesis on 9/21 with removal of 950 ml cloudy and serosanguinous exudative fluid with fluid pH 7.8, protein 3.6, Glucose 110, , Fluid WBC 1313 with neutrophil 35%, Fluid gram statin with no bacteria and culture neg for 24h as of 9/22/24. Course of oral Levofloxacin initiated to cover post obstructive pneumonia. Await further Plan from  Pulmonology  for bronchoscopy and  endobronchial ultrasound and biopsy. Pleural fluid cytology was still pending as of 9/24/24. Given Pt was largely stable on baseline line oxygen demand 3 to 4L/min, Pulmonology suggested outpatient bronchoscopy pending pleural fluid cytology. At this point I decided to discharge Pt home with Home Health and plan was discussed with daughter and SO at bedside. Pt was examined and deemed stable for discharge to home.         Pt was seen and examined on the day of discharge  Vitals:  VITAL SIGNS: 24 HRS MIN & MAX LAST   No data recorded 97.4 °F (36.3 °C)   No data recorded 139/85   No data recorded  74   No data recorded 18   No data recorded 97 %       Physical Exam:  General: In no acute distress, afebrile, On NC  Chest: Clear left lung. Decreased breath sounds Rt lung base  Heart: RRR, +S1, S2, no appreciable murmur  Abdomen: Soft, nontender, BS +  MSK: Warm, no left  lower extremity edema/ clubbing/cyanosis, Rt BKA  Neurologic: Alert and oriented. Moves ext spontaneously        Procedures Performed: No admission procedures for hospital encounter.     Significant Diagnostic Studies: See Full reports for all details    Recent Labs   Lab 09/23/24  0131   WBC 9.53   RBC 4.23*   HGB 12.7*   HCT 37.7*   MCV 89.1   MCH 30.0   MCHC 33.7   RDW 12.2      MPV 11.0*       Recent Labs   Lab 09/23/24  0131      K 4.4      CO2 28   BUN 9.7   CREATININE 0.72*   CALCIUM 8.9   MG 1.80        Microbiology Results (last 7 days)       Procedure Component Value Units Date/Time    Gram Stain [[id number]] Collected: 09/21/24 1442    Order Status: Completed Specimen: Body Fluid from Pleural Fluid Updated: 09/21/24 1917     GRAM STAIN Moderate WBC observed      No bacteria seen    KOH Prep [[id number]] Collected: 09/21/24 1442    Order Status: Completed Specimen: Body Fluid from Pleural Fluid Updated: 09/21/24 1747     KOH Prep No fungal elements seen    Fungal Culture [[id number]] Collected: 09/21/24 1442    Order Status:  Sent Specimen: Body Fluid from Pleural Fluid Updated: 09/21/24 1501    Mycobacteria and Nocardia Culture [[id number]] Collected: 09/21/24 1442    Order Status: Sent Specimen: Body Fluid from Thoracentesis Fluid Updated: 09/21/24 1501             X-Ray Chest 1 View  EXAMINATION  XR CHEST 1 VIEW    CLINICAL HISTORY  Rt pleural effusion s/p thora;    TECHNIQUE  A total of 1 frontal image(s) of the chest.    COMPARISON  19 September 2024    FINDINGS  Lines/tubes/devices: Endotracheal and esophageal tubes are no longer present.  ECG leads again overlie the chest.    The cardiac silhouette and central vascular structures are unchanged.  The trachea is midline. There is notably increased aeration of the right lung, with residual perihilar and basilar opacities.  Small residual pleural effusion also of noted on the right.  No new or worsening left lung field abnormality.  No convincing pneumothorax.    Regional osseous structures and extrathoracic soft tissues are similar.    IMPRESSION  1. Endotracheal and esophageal extubation.  2. Improved expansion of the right lung with residual perihilar and basilar opacities.  3. Small residual right pleural effusion.    Electronically signed by: Mp Suárez  Date:    09/22/2024  Time:    09:44         Medication List        START taking these medications      levETIRAcetam 500 MG Tab  Commonly known as: KEPPRA  Take 1 tablet (500 mg total) by mouth 2 (two) times daily.            CONTINUE taking these medications      ATIVAN 0.5 MG tablet  Generic drug: LORazepam     clopidogreL 75 mg tablet  Commonly known as: PLAVIX     diltiaZEM 120 MG tablet  Commonly known as: CARDIZEM     EScitalopram oxalate 5 MG Tab  Commonly known as: LEXAPRO     fluticasone propionate 50 mcg/actuation nasal spray  Commonly known as: FLONASE     HYDROcodone-acetaminophen  mg per tablet  Commonly known as: NORCO     ipratropium 42 mcg (0.06 %) nasal spray  Commonly known as: ATROVENT     memantine 10  MG Tab  Commonly known as: NAMENDA     tamsulosin 0.4 mg Cap  Commonly known as: FLOMAX     XARELTO 20 mg Tab  Generic drug: rivaroxaban            ASK your doctor about these medications      levoFLOXacin 750 MG tablet  Commonly known as: LEVAQUIN  Take 1 tablet (750 mg total) by mouth once daily. for 3 days  Ask about: Should I take this medication?               Where to Get Your Medications        These medications were sent to [facility] [address]  [address], [place] [address]      Phone: [phone]   levETIRAcetam 500 MG Tab  levoFLOXacin 750 MG tablet          Explained in detail to the patient about the discharge plan, medications, and follow-up visits. Pt understands and agrees with the treatment plan  Discharge Disposition: Home-Health Care [facility]   Discharged Condition: stable  Diet-    Medications Per DC med rec  Activities as tolerated   Follow-up Information       Michael Victoria MD. Schedule an appointment as soon as possible for a visit in 2 month(s).    Specialties: Neurology, Interventional Radiology  Why: Pleae follow up with Dr. Victoria/Emily Winchester NP to evaluate fusiform dilatation of the anterior communicating artery without definite saccular aneurysm on CTA Head APPT SCHEDULED  ON NOVEMBER 19, 2024 @ 2:30 PM.  Contact information:  [address] [place] [address] [address]  [facility] [id number] [phone]               Carl Barksdale MD. Schedule an appointment as soon as possible for a visit in 3 day(s).    Specialty: Pulmonary Disease  Why: Pulmonology visit for Cytology report and Lung mass 10-7-2024 @ 2:10 PM.  Contact information:  [address] [place] [address] [address]  [facility] [id number] [phone]               Shlomo Leach MD. Schedule an appointment as soon as possible for a visit in 3 day(s).    Specialty: Family Medicine  Why: Hospital discharge follow up mONDAY SEPT 30, 2:45 PM. FAXED DISCHARGE  SUMMARY AND FACESHEET TO [phone]. SPOKE WITH AUBRIE.  Contact information:  [address] [facility] [address]  [phone]               [facility] [place] [facility] Follow up.    Specialties: Home Health Services, Hospice and Palliative Medicine, Physical Therapy  Why: This is your home health provider. You may contact your provider for any questions or concerns regarding home health services.  Contact information:  [address] [facility]  [address]  [facility] [id number] [phone]                           For further questions contact hospitalist office    Discharge time 33 minutes    For worsening symptoms, chest pain, shortness of breath, increased abdominal pain, high grade fever, stroke or stroke like symptoms, immediately go to the nearest Emergency Room or call 911 as soon as possible.      Matias Gleason M.D, on 9/24/2024, 12:06 pm

## 2024-09-30 ENCOUNTER — ANESTHESIA EVENT (OUTPATIENT)
Dept: ENDOSCOPY | Facility: HOSPITAL | Age: 79
End: 2024-09-30
Payer: MEDICARE

## 2024-10-01 ENCOUNTER — HOSPITAL ENCOUNTER (INPATIENT)
Facility: HOSPITAL | Age: 79
LOS: 3 days | Discharge: HOME HEALTH | DRG: 829 | End: 2024-10-04
Attending: EMERGENCY MEDICINE | Admitting: HOSPITALIST
Payer: MEDICARE

## 2024-10-01 DIAGNOSIS — R06.02 SOB (SHORTNESS OF BREATH): ICD-10-CM

## 2024-10-01 DIAGNOSIS — J90 RECURRENT PLEURAL EFFUSION ON RIGHT: Primary | ICD-10-CM

## 2024-10-01 LAB
ALBUMIN SERPL-MCNC: 2.8 G/DL (ref 3.4–4.8)
ALBUMIN/GLOB SERPL: 0.8 RATIO (ref 1.1–2)
ALP SERPL-CCNC: 75 UNIT/L (ref 40–150)
ALT SERPL-CCNC: 17 UNIT/L (ref 0–55)
ANION GAP SERPL CALC-SCNC: 5 MEQ/L
AST SERPL-CCNC: 15 UNIT/L (ref 5–34)
BACTERIA #/AREA URNS AUTO: ABNORMAL /HPF
BASOPHILS # BLD AUTO: 0.05 X10(3)/MCL
BASOPHILS NFR BLD AUTO: 0.5 %
BILIRUB SERPL-MCNC: 0.4 MG/DL
BILIRUB UR QL STRIP.AUTO: NEGATIVE
BNP BLD-MCNC: 30.5 PG/ML
BUN SERPL-MCNC: 9.1 MG/DL (ref 8.4–25.7)
CALCIUM SERPL-MCNC: 8.8 MG/DL (ref 8.8–10)
CHLORIDE SERPL-SCNC: 103 MMOL/L (ref 98–107)
CLARITY UR: CLEAR
CO2 SERPL-SCNC: 31 MMOL/L (ref 23–31)
COLOR UR AUTO: ABNORMAL
CREAT SERPL-MCNC: 0.65 MG/DL (ref 0.73–1.18)
CREAT/UREA NIT SERPL: 14
EOSINOPHIL # BLD AUTO: 0.02 X10(3)/MCL (ref 0–0.9)
EOSINOPHIL NFR BLD AUTO: 0.2 %
ERYTHROCYTE [DISTWIDTH] IN BLOOD BY AUTOMATED COUNT: 12.6 % (ref 11.5–17)
GFR SERPLBLD CREATININE-BSD FMLA CKD-EPI: >60 ML/MIN/1.73/M2
GLOBULIN SER-MCNC: 3.3 GM/DL (ref 2.4–3.5)
GLUCOSE SERPL-MCNC: 103 MG/DL (ref 82–115)
GLUCOSE UR QL STRIP: NORMAL
HCT VFR BLD AUTO: 38.1 % (ref 42–52)
HGB BLD-MCNC: 12.5 G/DL (ref 14–18)
HGB UR QL STRIP: NEGATIVE
IMM GRANULOCYTES # BLD AUTO: 0.06 X10(3)/MCL (ref 0–0.04)
IMM GRANULOCYTES NFR BLD AUTO: 0.5 %
KETONES UR QL STRIP: NEGATIVE
LEUKOCYTE ESTERASE UR QL STRIP: NEGATIVE
LYMPHOCYTES # BLD AUTO: 0.88 X10(3)/MCL (ref 0.6–4.6)
LYMPHOCYTES NFR BLD AUTO: 8 %
MCH RBC QN AUTO: 29.2 PG (ref 27–31)
MCHC RBC AUTO-ENTMCNC: 32.8 G/DL (ref 33–36)
MCV RBC AUTO: 89 FL (ref 80–94)
MONOCYTES # BLD AUTO: 0.73 X10(3)/MCL (ref 0.1–1.3)
MONOCYTES NFR BLD AUTO: 6.7 %
NEUTROPHILS # BLD AUTO: 9.23 X10(3)/MCL (ref 2.1–9.2)
NEUTROPHILS NFR BLD AUTO: 84.1 %
NITRITE UR QL STRIP: NEGATIVE
NRBC BLD AUTO-RTO: 0 %
OHS QRS DURATION: 110 MS
OHS QTC CALCULATION: 427 MS
PH UR STRIP: 7 [PH]
PLATELET # BLD AUTO: 176 X10(3)/MCL (ref 130–400)
PMV BLD AUTO: 10.7 FL (ref 7.4–10.4)
POTASSIUM SERPL-SCNC: 4.1 MMOL/L (ref 3.5–5.1)
PROT SERPL-MCNC: 6.1 GM/DL (ref 5.8–7.6)
PROT UR QL STRIP: NEGATIVE
RBC # BLD AUTO: 4.28 X10(6)/MCL (ref 4.7–6.1)
RBC #/AREA URNS AUTO: ABNORMAL /HPF
SODIUM SERPL-SCNC: 139 MMOL/L (ref 136–145)
SP GR UR STRIP.AUTO: 1.01 (ref 1–1.03)
SQUAMOUS #/AREA URNS LPF: ABNORMAL /HPF
TROPONIN I SERPL-MCNC: <0.01 NG/ML (ref 0–0.04)
UROBILINOGEN UR STRIP-ACNC: NORMAL
WBC # BLD AUTO: 10.97 X10(3)/MCL (ref 4.5–11.5)
WBC #/AREA URNS AUTO: ABNORMAL /HPF

## 2024-10-01 PROCEDURE — 84484 ASSAY OF TROPONIN QUANT: CPT

## 2024-10-01 PROCEDURE — 96374 THER/PROPH/DIAG INJ IV PUSH: CPT

## 2024-10-01 PROCEDURE — 81001 URINALYSIS AUTO W/SCOPE: CPT

## 2024-10-01 PROCEDURE — 80053 COMPREHEN METABOLIC PANEL: CPT

## 2024-10-01 PROCEDURE — 63600175 PHARM REV CODE 636 W HCPCS: Performed by: EMERGENCY MEDICINE

## 2024-10-01 PROCEDURE — 11000001 HC ACUTE MED/SURG PRIVATE ROOM

## 2024-10-01 PROCEDURE — 93010 ELECTROCARDIOGRAM REPORT: CPT | Mod: ,,, | Performed by: INTERNAL MEDICINE

## 2024-10-01 PROCEDURE — 0W993ZZ DRAINAGE OF RIGHT PLEURAL CAVITY, PERCUTANEOUS APPROACH: ICD-10-PCS | Performed by: INTERNAL MEDICINE

## 2024-10-01 PROCEDURE — 25000003 PHARM REV CODE 250: Performed by: HOSPITALIST

## 2024-10-01 PROCEDURE — 83880 ASSAY OF NATRIURETIC PEPTIDE: CPT

## 2024-10-01 PROCEDURE — 85025 COMPLETE CBC W/AUTO DIFF WBC: CPT

## 2024-10-01 PROCEDURE — 27000221 HC OXYGEN, UP TO 24 HOURS

## 2024-10-01 PROCEDURE — 99285 EMERGENCY DEPT VISIT HI MDM: CPT | Mod: 25

## 2024-10-01 PROCEDURE — 93005 ELECTROCARDIOGRAM TRACING: CPT

## 2024-10-01 PROCEDURE — 21400001 HC TELEMETRY ROOM

## 2024-10-01 RX ORDER — LORAZEPAM 0.5 MG/1
0.5 TABLET ORAL EVERY 12 HOURS PRN
Status: DISCONTINUED | OUTPATIENT
Start: 2024-10-01 | End: 2024-10-04 | Stop reason: HOSPADM

## 2024-10-01 RX ORDER — IPRATROPIUM BROMIDE 42 UG/1
2 SPRAY, METERED NASAL 3 TIMES DAILY
Status: DISCONTINUED | OUTPATIENT
Start: 2024-10-01 | End: 2024-10-04 | Stop reason: HOSPADM

## 2024-10-01 RX ORDER — HYDROCODONE BITARTRATE AND ACETAMINOPHEN 10; 325 MG/1; MG/1
1 TABLET ORAL EVERY 6 HOURS PRN
Status: DISCONTINUED | OUTPATIENT
Start: 2024-10-01 | End: 2024-10-04 | Stop reason: HOSPADM

## 2024-10-01 RX ORDER — TALC
6 POWDER (GRAM) TOPICAL NIGHTLY PRN
Status: DISCONTINUED | OUTPATIENT
Start: 2024-10-01 | End: 2024-10-04 | Stop reason: HOSPADM

## 2024-10-01 RX ORDER — FLUTICASONE PROPIONATE 50 MCG
1 SPRAY, SUSPENSION (ML) NASAL DAILY
Status: DISCONTINUED | OUTPATIENT
Start: 2024-10-02 | End: 2024-10-04 | Stop reason: HOSPADM

## 2024-10-01 RX ORDER — SODIUM CHLORIDE 0.9 % (FLUSH) 0.9 %
10 SYRINGE (ML) INJECTION
Status: DISCONTINUED | OUTPATIENT
Start: 2024-10-01 | End: 2024-10-04 | Stop reason: HOSPADM

## 2024-10-01 RX ORDER — LEVETIRACETAM 500 MG/1
500 TABLET ORAL 2 TIMES DAILY
Status: DISCONTINUED | OUTPATIENT
Start: 2024-10-01 | End: 2024-10-04 | Stop reason: HOSPADM

## 2024-10-01 RX ORDER — FUROSEMIDE 10 MG/ML
40 INJECTION INTRAMUSCULAR; INTRAVENOUS
Status: COMPLETED | OUTPATIENT
Start: 2024-10-01 | End: 2024-10-01

## 2024-10-01 RX ORDER — DILTIAZEM HYDROCHLORIDE 60 MG/1
120 TABLET, FILM COATED ORAL DAILY
Status: DISCONTINUED | OUTPATIENT
Start: 2024-10-02 | End: 2024-10-04 | Stop reason: HOSPADM

## 2024-10-01 RX ORDER — ESCITALOPRAM OXALATE 5 MG/1
5 TABLET ORAL DAILY
Status: DISCONTINUED | OUTPATIENT
Start: 2024-10-02 | End: 2024-10-04 | Stop reason: HOSPADM

## 2024-10-01 RX ORDER — MEMANTINE HYDROCHLORIDE 5 MG/1
10 TABLET ORAL 2 TIMES DAILY
Status: DISCONTINUED | OUTPATIENT
Start: 2024-10-01 | End: 2024-10-04 | Stop reason: HOSPADM

## 2024-10-01 RX ORDER — TAMSULOSIN HYDROCHLORIDE 0.4 MG/1
1 CAPSULE ORAL DAILY
Status: DISCONTINUED | OUTPATIENT
Start: 2024-10-02 | End: 2024-10-04 | Stop reason: HOSPADM

## 2024-10-01 RX ADMIN — FUROSEMIDE 40 MG: 10 INJECTION, SOLUTION INTRAMUSCULAR; INTRAVENOUS at 03:10

## 2024-10-01 RX ADMIN — MEMANTINE 10 MG: 5 TABLET ORAL at 09:10

## 2024-10-01 RX ADMIN — LEVETIRACETAM 500 MG: 500 TABLET, FILM COATED ORAL at 09:10

## 2024-10-01 RX ADMIN — IPRATROPIUM BROMIDE 2 SPRAY: 42 SPRAY, METERED NASAL at 09:10

## 2024-10-01 NOTE — H&P
[facility]  Hospital Medicine History & Physical Examination       Patient Name: Gerald J Lejeune  MRN: [id number]  Patient Class: IP- Inpatient   Admission Date: 10/1/2024   Admitting Physician:  Service   Attending Physician: Andi Chow MD  Primary Care Provider: Shlomo Leach MD  Face-to-Face encounter date: 10/01/2024  Code Status:Code Status Discussion Note   Chief Complaint: Shortness of Breath (SOB that worsened 2 days ago. Patient normally on 4L NC, family had to increase O2 to 6L NC at home. RA in triage 89%. Placed on oxymask 10L O2 increased to 92%.  HX COPD and thoracentesis. C/o congestion and productive cough. )         Patient information was obtained from patient, patient's family, past medical records and ER records.     HISTORY OF PRESENT ILLNESS:   Gerald J Lejeune is a 79 y.o. male who  has a past medical history of Adenopathy, Anesthesia complication, Anxiety, BPH (benign prostatic hyperplasia), COPD (chronic obstructive pulmonary disease), Dementia, Depression, Hyperlipidemia, Hypertension, Obesity, Oxygen dependent, PAD (peripheral artery disease), Post-obstructive pneumonia due to foreign body aspiration, Seizures, Shortness of breath, Sleep apnea, and Walker as ambulation aid.. The patient presented to [facility] on 10/1/2024     79-year-old male admitted on 10/01/2024 to hospital medicine services due to worsening shortness a breath over the last few days.  Patient was noted mediastinal lymphadenopathy and known right-sided pleural effusion.  He was pre he was thoracentesis proximally 1 week ago.  He was discharged on supplemental O2.  He was wife is at the bedside helping provide history as the patient does have some chronic dementia.  She reports that they have had to continue increasing his home O2 to its max levels due to dyspnea.  In the emergency room he was placed on 10 L OxyMask with oxygen saturation in the upper 90s.  This has been weaned down  to about 4 L. ER physician spoke to his treating pulmonologist.  They were planning on outpatient bronchoscopy for further investigation of his lymphadenopathy and pleural effusion but plan to see the patient in the hospital since he was being admitted.  Plan for bronch possibly tomorrow with thoracentesis.  Patient was currently appears comfortable.  Labs reviewed and stable.  His previous thoracentesis did not show any malignant cells which I did inform the patient was wife.  He has been holding his Plavix and Xarelto for approximately 5 days in preparation for upcoming bronch.  PAST MEDICAL HISTORY:     Past Medical History:   Diagnosis Date    Adenopathy     Anesthesia complication     takes longer to wake    Anxiety     BPH (benign prostatic hyperplasia)     COPD (chronic obstructive pulmonary disease)     Dementia     Depression     Hyperlipidemia     Hypertension     Obesity     Oxygen dependent     4L/NC    PAD (peripheral artery disease)     Post-obstructive pneumonia due to foreign body aspiration     Levaquin    Seizures     Shortness of breath     Sleep apnea     CPAP    Walker as ambulation aid        PAST SURGICAL HISTORY:     Past Surgical History:   Procedure Laterality Date    BELOW KNEE AMPUTATION OF LOWER EXTREMITY Right     INSERTION, STENT, BARE METAL, ARTERY, PERIPHERAL  2019    THORACENTESIS  09/21/2024       ALLERGIES:   Patient has no known allergies.    FAMILY HISTORY:   Reviewed and negative    SOCIAL HISTORY:     Social History     Tobacco Use    Smoking status: Former     Types: Cigarettes    Smokeless tobacco: Never   Substance Use Topics    Alcohol use: Yes     Alcohol/week: 2.0 standard drinks of alcohol     Types: 2 Cans of beer per week        HOME MEDICATIONS:     Prior to Admission medications    Medication Sig Start Date End Date Taking? Authorizing Provider   diltiaZEM (CARDIZEM) 120 MG tablet Take 1 tablet by mouth once daily.   Yes Provider, Historical   EScitalopram oxalate  (LEXAPRO) 5 MG Tab Take 5 mg by mouth once daily. 7/8/24  Yes Provider, Historical   fluticasone propionate (FLONASE) 50 mcg/actuation nasal spray 1 spray by Each Nostril route once daily.   Yes Provider, Historical   ipratropium (ATROVENT) 42 mcg (0.06 %) nasal spray 2 sprays by Each Nostril route 3 (three) times daily.   Yes Provider, Historical   levETIRAcetam (KEPPRA) 500 MG Tab Take 1 tablet (500 mg total) by mouth 2 (two) times daily. 9/24/24 12/23/24 Yes Matias Palacio MD   memantine (NAMENDA) 10 MG Tab Take 10 mg by mouth 2 (two) times daily. 9/5/24  Yes Provider, Historical   tamsulosin (FLOMAX) 0.4 mg Cap Take 1 capsule by mouth once daily. 7/8/24  Yes Provider, Historical   clopidogreL (PLAVIX) 75 mg tablet Take 75 mg by mouth once daily.  Patient not taking: Reported on 9/25/2024 7/8/24   Provider, Historical   HYDROcodone-acetaminophen (NORCO)  mg per tablet Take 1 tablet by mouth every 6 (six) hours as needed for Pain. 7/8/24   Provider, Historical   LORazepam (ATIVAN) 0.5 MG tablet Take 0.5 mg by mouth every 12 (twelve) hours as needed for Anxiety. 7/8/24   Provider, Historical   XARELTO 20 mg Tab Take 20 mg by mouth once daily.    Provider, Historical       REVIEW OF SYSTEMS:   Except as documented, all other systems reviewed and negative     PHYSICAL EXAM:     VITAL SIGNS: 24 HRS MIN & MAX LAST   Temp  Min: 97.4 °F (36.3 °C)  Max: 97.8 °F (36.6 °C) 97.4 °F (36.3 °C)   BP  Min: 112/66  Max: 131/70 122/77   Pulse  Min: 75  Max: 79  76   Resp  Min: 20  Max: 25 20   SpO2  Min: 92 %  Max: 97 % (!) 94 %       General appearance: Well-developed, well-nourished male in no apparent distress.  HENT: Atraumatic head. Moist mucous membranes of oral cavity.  Eyes: Normal extraocular movements.   Neck: Supple.   Lungs:  Mild respiratory distress, on OxyMask, decreased breath  Sounds on right Heart: Regular rate and rhythm. S1 and S2 present with no murmurs/gallop/rub. No pedal edema. No JVD present.    Abdomen: Soft, non-distended, non-tender. No rebound tenderness/guarding. Bowel sounds are normal.   Extremities: No cyanosis, clubbing, or edema.  Skin: No Rash.   Neuro: Motor and sensory exams grossly intact. Good tone. Muscle strength 5/5 in all 4 extremities  Psych/mental status: Appropriate mood and affect. Responds appropriately to questions.     LABS AND IMAGING:     Recent Labs   Lab 10/01/24  1504   WBC 10.97   RBC 4.28*   HGB 12.5*   HCT 38.1*   MCV 89.0   MCH 29.2   MCHC 32.8*   RDW 12.6      MPV 10.7*       Recent Labs   Lab 10/01/24  1504      K 4.1      CO2 31   BUN 9.1   CREATININE 0.65*   CALCIUM 8.8   ALBUMIN 2.8*   ALKPHOS 75   ALT 17   AST 15   BILITOT 0.4       Microbiology Results (last 7 days)       ** No results found for the last 168 hours. **             X-Ray Chest 1 View  Narrative: EXAMINATION:  XR CHEST 1 VIEW    CLINICAL HISTORY:  shortness of breath;    TECHNIQUE:  One view    COMPARISON:  March 22, 2024.    FINDINGS:  Cardiopericardial silhouette overall appearance similar.  There is now complete opacification of the right hemithorax.  Left lung is clear with the exception of basilar some atelectasis.  No left pleural effusion.  No pneumothorax.  Impression: Complete opacification of the right hemithorax which suggest accumulation of large amount of fluid within the pleural space causing lung compressive atelectasis or central obstructive cause.    Electronically signed by: Mynor Virk  Date:    10/01/2024  Time:    15:21      _____________________________________  INPATIENT LIST OF MEDICATIONS   No current facility-administered medications for this encounter.      Scheduled Meds:    Continuous Infusions:  PRN Meds:.      VTE Prophylaxis: will be placed on appropriate DVT prophylaxis and will be advised to be as mobile as possible and sit in a chair as tolerated  _____________________________________    ASSESSMENT & PLAN:   Recurrent right-sided pleural effusion    Mediastinal lymphadenopathy   Essential hypertension   Chronic dementia   Hyperlipidemia   History of COPD    Patient was not currently have any wheezing on my exam.  Does have some decreased breath sounds and coarseness.    Suspect his dyspnea is due to recurrence of his pleural effusion.    Pulmonary has already been consulted.  Plan for bronchoscopy and thoracentesis tomorrow.    Okay for a diet for now and will be NPO after midnight.  He was Plavix and Xarelto are already on hold.  Will review his home medications and resume them as appropriate.    Critical care diagnosis: acute hypoxemic respiratory failure requiring high-flow oxygen  Critical care interventions: hands on evaluation, review of labs/radiographs/records and discussions with family  Critical care time spent: >32 minutes        Andi Chow MD  5:31 PM 10/01/2024    Screening for Social Drivers for health:  Patient screened for food insecurity, housing instability, transportation needs, utility difficulties, and interpersonal safety (select all that apply as identified as concern)  []Housing or Food  []Transportation Needs  []Utility Difficulties  []Interpersonal safety  [x]None

## 2024-10-01 NOTE — ED PROVIDER NOTES
Encounter Date: 10/1/2024    SCRIBE #1 NOTE: I, Azul Marcano, am scribing for, and in the presence of,  Catarino Chowdhury MD. I have scribed the following portions of the note - Other sections scribed: HPI, ROS, PE.       History     Chief Complaint   Patient presents with    Shortness of Breath     SOB that worsened 2 days ago. Patient normally on 4L NC, family had to increase O2 to 6L NC at home. RA in triage 89%. Placed on oxymask 10L O2 increased to 92%.  HX COPD and thoracentesis. C/o congestion and productive cough.      Patient is a 79 year old male with a history of COPD, dementia, HLD, HTN, and PAD that presents to the ED for shortness of breath onset 2 days ago. Patient's significant other at bedside reports the patient had an X-Ray done with Dr. Leach yesterday; they were advised to come to the ED for a recurrent right-sided pleural effusion. She reports a thoracentesis about a week and a half ago. She reports having to consistently increased his at home oxygen for 2 days. He reports a productive cough. He denies chest pain, fever, and weight loss.     The history is provided by the patient and a significant other.     Review of patient's allergies indicates:  No Known Allergies  Past Medical History:   Diagnosis Date    Adenopathy     Anesthesia complication     takes longer to wake    Anxiety     BPH (benign prostatic hyperplasia)     COPD (chronic obstructive pulmonary disease)     Dementia     Depression     Hyperlipidemia     Hypertension     Obesity     Oxygen dependent     4L/NC    PAD (peripheral artery disease)     Post-obstructive pneumonia due to foreign body aspiration     Levaquin    Seizures     Shortness of breath     Sleep apnea     CPAP    Walker as ambulation aid      Past Surgical History:   Procedure Laterality Date    BELOW KNEE AMPUTATION OF LOWER EXTREMITY Right     INSERTION, STENT, BARE METAL, ARTERY, PERIPHERAL  2019    THORACENTESIS  09/21/2024     No family history on  file.  Social History     Tobacco Use    Smoking status: Former     Types: Cigarettes    Smokeless tobacco: Never   Substance Use Topics    Alcohol use: Yes     Alcohol/week: 2.0 standard drinks of alcohol     Types: 2 Cans of beer per week    Drug use: Never     Review of Systems   Unable to perform ROS: Dementia   Constitutional:  Negative for fever and unexpected weight change.   Respiratory:  Positive for cough and shortness of breath.    Cardiovascular:  Negative for chest pain.       Physical Exam     Initial Vitals [10/01/24 1446]   BP Pulse Resp Temp SpO2   112/66 79 (!) 25 97.8 °F (36.6 °C) (!) 92 %      MAP       --         Physical Exam    Constitutional: He appears well-developed and well-nourished. No distress.   HENT:   Head: Normocephalic and atraumatic.   Cardiovascular:  Normal rate.           Pulmonary/Chest: No respiratory distress. He exhibits no tenderness.   Decreased breath sounds on the right    Abdominal: Abdomen is soft. He exhibits no distension. There is no abdominal tenderness. There is no rebound and no guarding.   Musculoskeletal:      Comments: Trace edema to left lower extremity.      Neurological: He is alert. He has normal strength.   Skin: Skin is warm and dry.         ED Course   Procedures  Labs Reviewed   COMPREHENSIVE METABOLIC PANEL - Abnormal       Result Value    Sodium 139      Potassium 4.1      Chloride 103      CO2 31      Glucose 103      Blood Urea Nitrogen 9.1      Creatinine 0.65 (*)     Calcium 8.8      Protein Total 6.1      Albumin 2.8 (*)     Globulin 3.3      Albumin/Globulin Ratio 0.8 (*)     Bilirubin Total 0.4      ALP 75      ALT 17      AST 15      eGFR >60      Anion Gap 5.0      BUN/Creatinine Ratio 14     CBC WITH DIFFERENTIAL - Abnormal    WBC 10.97      RBC 4.28 (*)     Hgb 12.5 (*)     Hct 38.1 (*)     MCV 89.0      MCH 29.2      MCHC 32.8 (*)     RDW 12.6      Platelet 176      MPV 10.7 (*)     Neut % 84.1      Lymph % 8.0      Mono % 6.7      Eos %  0.2      Basophil % 0.5      Lymph # 0.88      Neut # 9.23 (*)     Mono # 0.73      Eos # 0.02      Baso # 0.05      IG# 0.06 (*)     IG% 0.5      NRBC% 0.0     TROPONIN I - Normal    Troponin-I <0.010     B-TYPE NATRIURETIC PEPTIDE - Normal    Natriuretic Peptide 30.5     CBC W/ AUTO DIFFERENTIAL    Narrative:     The following orders were created for panel order CBC Auto Differential.  Procedure                               Abnormality         Status                     ---------                               -----------         ------                     CBC with Differential[[id number]]       Abnormal            Final result                 Please view results for these tests on the individual orders.   URINALYSIS, REFLEX TO URINE CULTURE          Imaging Results              X-Ray Chest 1 View (Final result)  Result time 10/01/24 15:21:41      Final result by Mynor Virk MD (10/01/24 15:21:41)                   Impression:      Complete opacification of the right hemithorax which suggest accumulation of large amount of fluid within the pleural space causing lung compressive atelectasis or central obstructive cause.      Electronically signed by: Mynor Virk  Date:    10/01/2024  Time:    15:21               Narrative:    EXAMINATION:  XR CHEST 1 VIEW    CLINICAL HISTORY:  shortness of breath;    TECHNIQUE:  One view    COMPARISON:  March 22, 2024.    FINDINGS:  Cardiopericardial silhouette overall appearance similar.  There is now complete opacification of the right hemithorax.  Left lung is clear with the exception of basilar some atelectasis.  No left pleural effusion.  No pneumothorax.                                       Medications   furosemide injection 40 mg (40 mg Intravenous Given 10/1/24 1537)     Medical Decision Making  Differential diagnosis includes, but is not limited to, pleural effusion, malignancy, cancer, pneumonia, and heart failure     Amount and/or Complexity of Data  Reviewed  Labs: ordered.  Radiology: ordered and independent interpretation performed.     Details: X-ray performed at 1513 interpreted by me at patient's bedside. Right pleural effusion noted.   ECG/medicine tests: ordered and independent interpretation performed.    Risk  Prescription drug management.            Scribe Attestation:   Scribe #1: I performed the above scribed service and the documentation accurately describes the services I performed. I attest to the accuracy of the note.    Attending Attestation:           Physician Attestation for Scribe:  Physician Attestation Statement for Scribe #1: I, Catarino Chowdhury MD, reviewed documentation, as scribed by Azul Marcano in my presence, and it is both accurate and complete.             ED Course as of 10/01/24 1624   Tue Oct 01, 2024   1537 Patient has been off his Plavix and Xarelto for 5 days in anticipation of his proceed [LF]   1605 Paged [facility] medicine  [ED]   1605 Discussed with Dr Suarez.  He is aware the patient was not personally seen him yet.  His team has been following this patient he was planned for a procedure in the morning.  Recommends patient be admitted pulmonary will see him during consultation and they can perform paracentesis and still plan for endobronchial biopsy during admission [LF]   1606 Per my review of records pulmonary goals were oxygen saturation 88-92% during his admission.  Currently he was 96% will weaned down oxygen to keep goals 88-92 [LF]   1621 Dr Leach called, we discussed case.   I discussed case with the hospitalist Emilee.  They will admit [LF]      ED Course User Index  [ED] Azul Marcano  [LF] Catarino Chowdhury MD            Patient was doing well has no complaints.  Shortness of breath has been progressing over last month nothing acutely worsened today per the girlfriend who was a good historian.  They state they did have an outpatient x-ray showed recurrence of the effusion were advised to come back to the  emergency department.  The primary doctor had discussed it with pulmonary.  I also discussed with Pulmonary in the primary doctor.  For he was goals of oxygenation or 88-92% he was easily satting 94% on supplemental oxygen.  I weaned him down from the initial 10 L still easily region goals.  Discussed with the hospitalist who will admit continue to hold Plavix and Xarelto                 Clinical Impression:  Final diagnoses:  [R06.02] SOB (shortness of breath)  [J90] Recurrent pleural effusion on right (Primary)          ED Disposition Condition    Admit Stable                Catarino Chowdhury MD  10/01/24 8932

## 2024-10-02 ENCOUNTER — ANESTHESIA (OUTPATIENT)
Dept: ENDOSCOPY | Facility: HOSPITAL | Age: 79
End: 2024-10-02
Payer: MEDICARE

## 2024-10-02 LAB
ALBUMIN SERPL-MCNC: 2.7 G/DL (ref 3.4–4.8)
ALBUMIN/GLOB SERPL: 0.9 RATIO (ref 1.1–2)
ALP SERPL-CCNC: 73 UNIT/L (ref 40–150)
ALT SERPL-CCNC: 15 UNIT/L (ref 0–55)
ANION GAP SERPL CALC-SCNC: 9 MEQ/L
AST SERPL-CCNC: 12 UNIT/L (ref 5–34)
BASOPHILS # BLD AUTO: 0.04 X10(3)/MCL
BASOPHILS NFR BLD AUTO: 0.5 %
BILIRUB SERPL-MCNC: 0.5 MG/DL
BUN SERPL-MCNC: 9.2 MG/DL (ref 8.4–25.7)
CALCIUM SERPL-MCNC: 8.8 MG/DL (ref 8.8–10)
CHLORIDE SERPL-SCNC: 100 MMOL/L (ref 98–107)
CO2 SERPL-SCNC: 31 MMOL/L (ref 23–31)
CREAT SERPL-MCNC: 0.66 MG/DL (ref 0.73–1.18)
CREAT/UREA NIT SERPL: 14
EOSINOPHIL # BLD AUTO: 0 X10(3)/MCL (ref 0–0.9)
EOSINOPHIL NFR BLD AUTO: 0 %
ERYTHROCYTE [DISTWIDTH] IN BLOOD BY AUTOMATED COUNT: 12.5 % (ref 11.5–17)
GFR SERPLBLD CREATININE-BSD FMLA CKD-EPI: >60 ML/MIN/1.73/M2
GLOBULIN SER-MCNC: 3 GM/DL (ref 2.4–3.5)
GLUCOSE SERPL-MCNC: 92 MG/DL (ref 82–115)
HCT VFR BLD AUTO: 36.4 % (ref 42–52)
HGB BLD-MCNC: 12.3 G/DL (ref 14–18)
IMM GRANULOCYTES # BLD AUTO: 0.07 X10(3)/MCL (ref 0–0.04)
IMM GRANULOCYTES NFR BLD AUTO: 0.8 %
LYMPHOCYTES # BLD AUTO: 1.08 X10(3)/MCL (ref 0.6–4.6)
LYMPHOCYTES NFR BLD AUTO: 12.2 %
MCH RBC QN AUTO: 29.6 PG (ref 27–31)
MCHC RBC AUTO-ENTMCNC: 33.8 G/DL (ref 33–36)
MCV RBC AUTO: 87.5 FL (ref 80–94)
MONOCYTES # BLD AUTO: 0.75 X10(3)/MCL (ref 0.1–1.3)
MONOCYTES NFR BLD AUTO: 8.5 %
NEUTROPHILS # BLD AUTO: 6.92 X10(3)/MCL (ref 2.1–9.2)
NEUTROPHILS NFR BLD AUTO: 78 %
NRBC BLD AUTO-RTO: 0 %
PLATELET # BLD AUTO: 196 X10(3)/MCL (ref 130–400)
PMV BLD AUTO: 10.3 FL (ref 7.4–10.4)
POCT GLUCOSE: 111 MG/DL (ref 70–110)
POTASSIUM SERPL-SCNC: 3.9 MMOL/L (ref 3.5–5.1)
PROT SERPL-MCNC: 5.7 GM/DL (ref 5.8–7.6)
RBC # BLD AUTO: 4.16 X10(6)/MCL (ref 4.7–6.1)
SODIUM SERPL-SCNC: 140 MMOL/L (ref 136–145)
WBC # BLD AUTO: 8.86 X10(3)/MCL (ref 4.5–11.5)

## 2024-10-02 PROCEDURE — 31625 BRONCHOSCOPY W/BIOPSY(S): CPT | Performed by: INTERNAL MEDICINE

## 2024-10-02 PROCEDURE — 27000221 HC OXYGEN, UP TO 24 HOURS

## 2024-10-02 PROCEDURE — 88112 CYTOPATH CELL ENHANCE TECH: CPT

## 2024-10-02 PROCEDURE — 88341 IMHCHEM/IMCYTCHM EA ADD ANTB: CPT

## 2024-10-02 PROCEDURE — 99900035 HC TECH TIME PER 15 MIN (STAT)

## 2024-10-02 PROCEDURE — 88360 TUMOR IMMUNOHISTOCHEM/MANUAL: CPT

## 2024-10-02 PROCEDURE — 88305 TISSUE EXAM BY PATHOLOGIST: CPT | Performed by: INTERNAL MEDICINE

## 2024-10-02 PROCEDURE — 88172 CYTP DX EVAL FNA 1ST EA SITE: CPT

## 2024-10-02 PROCEDURE — 0BBC8ZX EXCISION OF RIGHT UPPER LUNG LOBE, VIA NATURAL OR ARTIFICIAL OPENING ENDOSCOPIC, DIAGNOSTIC: ICD-10-PCS | Performed by: INTERNAL MEDICINE

## 2024-10-02 PROCEDURE — 88173 CYTOPATH EVAL FNA REPORT: CPT

## 2024-10-02 PROCEDURE — 25000242 PHARM REV CODE 250 ALT 637 W/ HCPCS: Performed by: HOSPITALIST

## 2024-10-02 PROCEDURE — 21400001 HC TELEMETRY ROOM

## 2024-10-02 PROCEDURE — 0BB18ZX EXCISION OF TRACHEA, VIA NATURAL OR ARTIFICIAL OPENING ENDOSCOPIC, DIAGNOSTIC: ICD-10-PCS | Performed by: INTERNAL MEDICINE

## 2024-10-02 PROCEDURE — 37000009 HC ANESTHESIA EA ADD 15 MINS: Performed by: INTERNAL MEDICINE

## 2024-10-02 PROCEDURE — C1726 CATH, BAL DIL, NON-VASCULAR: HCPCS | Performed by: INTERNAL MEDICINE

## 2024-10-02 PROCEDURE — 88342 IMHCHEM/IMCYTCHM 1ST ANTB: CPT

## 2024-10-02 PROCEDURE — 25000003 PHARM REV CODE 250: Performed by: HOSPITALIST

## 2024-10-02 PROCEDURE — 63600175 PHARM REV CODE 636 W HCPCS

## 2024-10-02 PROCEDURE — 31652 BRONCH EBUS SAMPLNG 1/2 NODE: CPT | Performed by: INTERNAL MEDICINE

## 2024-10-02 PROCEDURE — 37000008 HC ANESTHESIA 1ST 15 MINUTES: Performed by: INTERNAL MEDICINE

## 2024-10-02 PROCEDURE — 85025 COMPLETE CBC W/AUTO DIFF WBC: CPT | Performed by: PHYSICIAN ASSISTANT

## 2024-10-02 PROCEDURE — 94760 N-INVAS EAR/PLS OXIMETRY 1: CPT

## 2024-10-02 PROCEDURE — 94640 AIRWAY INHALATION TREATMENT: CPT

## 2024-10-02 PROCEDURE — 11000001 HC ACUTE MED/SURG PRIVATE ROOM

## 2024-10-02 PROCEDURE — 80053 COMPREHEN METABOLIC PANEL: CPT | Performed by: PHYSICIAN ASSISTANT

## 2024-10-02 PROCEDURE — 36415 COLL VENOUS BLD VENIPUNCTURE: CPT | Performed by: PHYSICIAN ASSISTANT

## 2024-10-02 PROCEDURE — 07B74ZX EXCISION OF THORAX LYMPHATIC, PERCUTANEOUS ENDOSCOPIC APPROACH, DIAGNOSTIC: ICD-10-PCS | Performed by: INTERNAL MEDICINE

## 2024-10-02 PROCEDURE — 27201423 OPTIME MED/SURG SUP & DEVICES STERILE SUPPLY: Performed by: INTERNAL MEDICINE

## 2024-10-02 RX ORDER — LIDOCAINE HYDROCHLORIDE 20 MG/ML
INJECTION INTRAVENOUS
Status: DISCONTINUED | OUTPATIENT
Start: 2024-10-02 | End: 2024-10-02

## 2024-10-02 RX ORDER — PROPOFOL 10 MG/ML
INJECTION, EMULSION INTRAVENOUS
Status: DISCONTINUED | OUTPATIENT
Start: 2024-10-02 | End: 2024-10-02

## 2024-10-02 RX ORDER — LIDOCAINE HYDROCHLORIDE 10 MG/ML
1 INJECTION, SOLUTION EPIDURAL; INFILTRATION; INTRACAUDAL; PERINEURAL ONCE
Status: CANCELLED | OUTPATIENT
Start: 2024-10-02 | End: 2024-10-02

## 2024-10-02 RX ORDER — SODIUM CHLORIDE, SODIUM GLUCONATE, SODIUM ACETATE, POTASSIUM CHLORIDE AND MAGNESIUM CHLORIDE 30; 37; 368; 526; 502 MG/100ML; MG/100ML; MG/100ML; MG/100ML; MG/100ML
INJECTION, SOLUTION INTRAVENOUS CONTINUOUS
Status: CANCELLED | OUTPATIENT
Start: 2024-10-02 | End: 2024-11-01

## 2024-10-02 RX ORDER — GLUCAGON 1 MG
1 KIT INJECTION
Status: CANCELLED | OUTPATIENT
Start: 2024-10-02

## 2024-10-02 RX ORDER — IPRATROPIUM BROMIDE AND ALBUTEROL SULFATE 2.5; .5 MG/3ML; MG/3ML
3 SOLUTION RESPIRATORY (INHALATION) EVERY 4 HOURS PRN
Status: DISCONTINUED | OUTPATIENT
Start: 2024-10-02 | End: 2024-10-04 | Stop reason: HOSPADM

## 2024-10-02 RX ORDER — SODIUM CHLORIDE, SODIUM LACTATE, POTASSIUM CHLORIDE, CALCIUM CHLORIDE 600; 310; 30; 20 MG/100ML; MG/100ML; MG/100ML; MG/100ML
INJECTION, SOLUTION INTRAVENOUS CONTINUOUS PRN
Status: DISCONTINUED | OUTPATIENT
Start: 2024-10-02 | End: 2024-10-02

## 2024-10-02 RX ORDER — DEXAMETHASONE SODIUM PHOSPHATE 4 MG/ML
INJECTION, SOLUTION INTRA-ARTICULAR; INTRALESIONAL; INTRAMUSCULAR; INTRAVENOUS; SOFT TISSUE
Status: DISCONTINUED | OUTPATIENT
Start: 2024-10-02 | End: 2024-10-02

## 2024-10-02 RX ORDER — ONDANSETRON HYDROCHLORIDE 2 MG/ML
INJECTION, SOLUTION INTRAVENOUS
Status: DISCONTINUED | OUTPATIENT
Start: 2024-10-02 | End: 2024-10-02

## 2024-10-02 RX ADMIN — LEVETIRACETAM 500 MG: 500 TABLET, FILM COATED ORAL at 08:10

## 2024-10-02 RX ADMIN — IPRATROPIUM BROMIDE 2 SPRAY: 42 SPRAY, METERED NASAL at 08:10

## 2024-10-02 RX ADMIN — MEMANTINE 10 MG: 5 TABLET ORAL at 08:10

## 2024-10-02 RX ADMIN — ESCITALOPRAM OXALATE 5 MG: 5 TABLET, FILM COATED ORAL at 02:10

## 2024-10-02 RX ADMIN — DEXAMETHASONE SODIUM PHOSPHATE 4 MG: 4 INJECTION, SOLUTION INTRA-ARTICULAR; INTRALESIONAL; INTRAMUSCULAR; INTRAVENOUS; SOFT TISSUE at 11:10

## 2024-10-02 RX ADMIN — SODIUM CHLORIDE, POTASSIUM CHLORIDE, SODIUM LACTATE AND CALCIUM CHLORIDE: 600; 310; 30; 20 INJECTION, SOLUTION INTRAVENOUS at 11:10

## 2024-10-02 RX ADMIN — PROPOFOL 125 MCG/KG/MIN: 10 INJECTION, EMULSION INTRAVENOUS at 11:10

## 2024-10-02 RX ADMIN — DILTIAZEM HYDROCHLORIDE 120 MG: 60 TABLET, FILM COATED ORAL at 02:10

## 2024-10-02 RX ADMIN — TAMSULOSIN HYDROCHLORIDE 0.4 MG: 0.4 CAPSULE ORAL at 02:10

## 2024-10-02 RX ADMIN — IPRATROPIUM BROMIDE AND ALBUTEROL SULFATE 3 ML: .5; 3 SOLUTION RESPIRATORY (INHALATION) at 08:10

## 2024-10-02 RX ADMIN — PROPOFOL 80 MG: 10 INJECTION, EMULSION INTRAVENOUS at 11:10

## 2024-10-02 RX ADMIN — LIDOCAINE HYDROCHLORIDE 40 MG: 20 INJECTION INTRAVENOUS at 11:10

## 2024-10-02 RX ADMIN — ONDANSETRON 4 MG: 2 INJECTION INTRAMUSCULAR; INTRAVENOUS at 11:10

## 2024-10-02 RX ADMIN — IPRATROPIUM BROMIDE 2 SPRAY: 42 SPRAY, METERED NASAL at 02:10

## 2024-10-02 RX ADMIN — IPRATROPIUM BROMIDE AND ALBUTEROL SULFATE 3 ML: .5; 3 SOLUTION RESPIRATORY (INHALATION) at 04:10

## 2024-10-02 NOTE — OP NOTE
[facility]   Thoracentesis  Procedure Note    SUMMARY     Date of Procedure:  10/2/2024    Procedure:  Diagnostic/therapeutic right thoracentesis    Performed by: [facility]arez [facility], [facility]        Pre-Operative Diagnosis:  Recurrent large right pleural effusion    Post-Operative Diagnosis:  Recurrent large right pleural effusion    Anesthesia: Local, 1% lidocaine without epinephrine      Description of the Findings of the Procedure:   Proper informed consent was obtained, including a detailed discussion of the potential risks and benefits of the procedure.  Patient was placed in a sitting position, and ultrasound used to localize a large right pleural effusion.  Skin was then marked, and was then prepped using chlorhexidine which was allowed to dry.  Sterile drape was then applied.  Xylocaine 1% without epinephrine, 3 ml, was used to anesthetize skin.  A 22 gauge needle was then inserted over rib, into pleural space with return of serosanguineous thin pleural fluid.  A thoracentesis needle/catheter was then inserted over rib, into pleural space, and catheter was fed without resistance. Vacutainer bottles were then connected to catheter, removing a total of 1100 cc of serosanguineous thin pleural fluid.  No complications.  Examination of pleural space with ultrasound was performed postprocedure, revealing only a thin rim of remaining pleural fluid.  Post procedure chest x-ray demonstrates significant improved inflation of the right lung with perihilar infiltration/soft tissue mass attenuation, no evidence of pneumothorax.      Complications: none    Estimated Blood Loss (EBL):  0 ml           Condition:  Stable    Disposition:  Hospital floor room      Karla Suarez MD, [facility]  Pulmonary/Critical Care

## 2024-10-02 NOTE — CONSULTS
[facility] - 5th Floor Med Surg  Pulmonary Critical Care Note    Patient Name: Gerald J Lejeune  MRN: [id number]  Admission Date: 10/1/2024  Hospital Length of Stay: 1 days  Code Status: Full Code  Attending Provider: Andi Chow MD  Primary Care Provider: Shlomo Leach MD     Subjective:     HPI:   This is a 79-year-old male with a past medical history as outlined below who presented to the hospital on 10/01/2024 with increasing shortness of breath.  He was hospitalized 2 weeks ago with new onset seizures and at that time was found to have a right-sided pleural effusion and bulky mediastinal lymphadenopathy.  He underwent a right-sided thoracentesis on 09/21/2024, pleural fluid was lymphocytic exudative and pathology was negative for malignancy.  His Plavix and Xarelto were held in preparation for an EBUS that was scheduled as an outpatient for today but unfortunately was readmitted earlier and found to have re accumulation of pleural fluid.     Hospital Course/Significant events:  As above    24 Hour Interval History:  Sitting on the side of the bed on 5L oxymask oxygenating well. NPO for procedures. Plavix and xarelto have been on hold for over 7 days.     Past Medical History:   Diagnosis Date    Adenopathy     Anesthesia complication     takes longer to wake    Anxiety     BPH (benign prostatic hyperplasia)     COPD (chronic obstructive pulmonary disease)     Dementia     Depression     Hyperlipidemia     Hypertension     Obesity     Oxygen dependent     4L/NC    PAD (peripheral artery disease)     Post-obstructive pneumonia due to foreign body aspiration     Levaquin    Seizures     Shortness of breath     Sleep apnea     CPAP    Walker as ambulation aid        Past Surgical History:   Procedure Laterality Date    BELOW KNEE AMPUTATION OF LOWER EXTREMITY Right     INSERTION, STENT, BARE METAL, ARTERY, PERIPHERAL  2019    THORACENTESIS  09/21/2024       Social History     Socioeconomic  History    Marital status:    Tobacco Use    Smoking status: Former     Types: Cigarettes    Smokeless tobacco: Never   Substance and Sexual Activity    Alcohol use: Yes     Alcohol/week: 2.0 standard drinks of alcohol     Types: 2 Cans of beer per week    Drug use: Never     Social Drivers of Health     Financial Resource Strain: Low Risk  (10/1/2024)    Overall Financial Resource Strain (CARDIA)     Difficulty of Paying Living Expenses: Not hard at all   Food Insecurity: No Food Insecurity (10/1/2024)    Hunger Vital Sign     Worried About Running Out of Food in the Last Year: Never true     Ran Out of Food in the Last Year: Never true   Transportation Needs: No Transportation Needs (10/1/2024)    TRANSPORTATION NEEDS     Transportation : No   Stress: No Stress Concern Present (10/1/2024)    [employer] - Occupational Stress Questionnaire     Feeling of Stress : Only a little   Housing Stability: Low Risk  (10/1/2024)    Housing Stability Vital Sign     Unable to Pay for Housing in the Last Year: No     Homeless in the Last Year: No           Current Outpatient Medications   Medication Instructions    clopidogreL (PLAVIX) 75 mg, Daily    diltiaZEM (CARDIZEM) 120 MG tablet 1 tablet, Daily    EScitalopram oxalate (LEXAPRO) 5 mg, Daily    fluticasone propionate (FLONASE) 50 mcg/actuation nasal spray 1 spray, Daily    HYDROcodone-acetaminophen (NORCO)  mg per tablet 1 tablet, Oral, Every 6 hours PRN    ipratropium (ATROVENT) 42 mcg (0.06 %) nasal spray 2 sprays, 3 times daily    levETIRAcetam (KEPPRA) 500 mg, Oral, 2 times daily    LORazepam (ATIVAN) 0.5 mg, Oral, Every 12 hours PRN    memantine (NAMENDA) 10 mg, 2 times daily    tamsulosin (FLOMAX) 0.4 mg Cap 1 capsule, Daily    XARELTO 20 mg, Oral, Daily       Current Inpatient Medications   diltiaZEM  120 mg Oral Daily    EScitalopram oxalate  5 mg Oral Daily    fluticasone propionate  1 spray Each Nostril Daily     ipratropium  2 spray Each Nostril TID    levETIRAcetam  500 mg Oral BID    memantine  10 mg Oral BID    tamsulosin  1 capsule Oral Daily       Current Intravenous Infusions        Review of Systems   Respiratory:  Positive for shortness of breath.           Objective:       Intake/Output Summary (Last 24 hours) at 10/2/2024 0751  Last data filed at 10/2/2024 0347  Gross per 24 hour   Intake --   Output 1670 ml   Net -1670 ml         Vital Signs (Most Recent):  Temp: 98.3 °F (36.8 °C) (10/02/24 0700)  Pulse: 84 (10/02/24 0700)  Resp: 17 (10/02/24 0700)  BP: 118/76 (10/02/24 0700)  SpO2: (!) 94 % (10/02/24 0700)  Body mass index is 32.15 kg/m².  Weight: 82.3 kg (181 lb 8 oz) (patient was 183 at the doctors office yesterday per Ms. Orourke) Vital Signs (24h Range):  Temp:  [97.4 °F (36.3 °C)-98.3 °F (36.8 °C)] 98.3 °F (36.8 °C)  Pulse:  [75-84] 84  Resp:  [17-25] 17  SpO2:  [84 %-97 %] 94 %  BP: (112-131)/(66-78) 118/76     Physical Exam  Vitals reviewed.   Constitutional:       Appearance: Normal appearance.   HENT:      Head: Normocephalic and atraumatic.   Cardiovascular:      Rate and Rhythm: Normal rate.      Heart sounds: Normal heart sounds.   Pulmonary:      Effort: Pulmonary effort is normal.      Comments: Diminished throughout the right side   Abdominal:      Palpations: Abdomen is soft.   Musculoskeletal:      Cervical back: Neck supple.   Neurological:      General: No focal deficit present.      Mental Status: He is alert and oriented to person, place, and time.           Lines/Drains/Airways       Peripheral Intravenous Line  Duration                  Peripheral IV - Single Lumen 10/01/24 1508 18 G Anterior;Distal;Left Upper Arm <1 day                    Significant Labs:    Lab Results   Component Value Date    WBC 8.86 10/02/2024    HGB 12.3 (L) 10/02/2024    HCT 36.4 (L) 10/02/2024    MCV 87.5 10/02/2024     10/02/2024           BMP  Lab Results   Component Value Date     10/02/2024    K 3.9  "10/02/2024    CO2 31 10/02/2024    BUN 9.2 10/02/2024    CREATININE 0.66 (L) 10/02/2024    CALCIUM 8.8 10/02/2024    AGAP 9.0 10/02/2024         ABG  No results for input(s): "PH", "PO2", "PCO2", "HCO3", "POCBASEDEF" in the last 168 hours.        Significant Imaging:  I have reviewed the pertinent imaging within the past 24 hours.  X-Ray Chest 1 View  Narrative: EXAMINATION:  XR CHEST 1 VIEW    CLINICAL HISTORY:  shortness of breath;    TECHNIQUE:  One view    COMPARISON:  March 22, 2024.    FINDINGS:  Cardiopericardial silhouette overall appearance similar.  There is now complete opacification of the right hemithorax.  Left lung is clear with the exception of basilar some atelectasis.  No left pleural effusion.  No pneumothorax.  Impression: Complete opacification of the right hemithorax which suggest accumulation of large amount of fluid within the pleural space causing lung compressive atelectasis or central obstructive cause.    Electronically signed by: Mynor Virk  Date:    10/01/2024  Time:    15:21          Assessment/Plan:     Assessment  Large right sided recurrent pleural effusion with mediastinal lymphadenopathy -- s/p thoracentesis on 9/21      Plan  -Plavix and xarelto have been on hold for over a week  -Keep NPO, plans for thoracentesis and EBUS thereafter  -Further to follow        CLEMENTINA Campos  Pulmonary Critical Care Medicine  [facility] - 5th Floor Med Surg  DOS: 10/02/2024     "

## 2024-10-02 NOTE — OP NOTE
[facility]  Fiberoptic Bronchoscopy with EBUS  Operative Note    SUMMARY     Date of Procedure: 10/2/2024    Procedure:  Fiberoptic bronchoscopy with endobronchial biopsies right upper lobe, and EBUS guided biopsies of right paratracheal (4R) adenopathy    Performed by: Karla Suarez MD, [facility]    Pre-Procedure Diagnosis:  Right hilar/upper lobe mass with mediastinal adenopathy    Post-Procedure Diagnosis: Right hilar/upper lobe mass with mediastinal adenopathy    Anesthesia:  LMA with anesthesia per anesthesiology service        Description of the Findings of the Procedure:   Patient was consented for the procedure with all risks and benefits of the procedure explained in detail.  Patient was given the opportunity to ask questions and all concerns were answered.  Anesthesia service performed anesthesia and placed LMA.  Fiberoptic bronchoscope was passed per LMA which appeared to be in good position.  Vocal cords normal and freely movable both pre and post bronchoscopy.  Trachea normal, chantale sharp.  Left airways were patent to all subsegmental bronchi without extrinsic compression or abnormal mucosa, no endobronchial lesion seen.  Upon entering right main bronchus the right upper lobe was noted near completely occluded with endobronchial fungating mass.  This extended into a cobblestoning of the right bronchus intermedius which was patent distally.  Multiple endobronchial biopsies were taken and sent for final pathology.  EBUS scope was passed per LMA, and ultrasound inspection was performed of the mediastinal structures.  Bulky right paratracheal 4R adenopathy was noted, and multiple transtracheal needle biopsies were taken with cores obtained.  Rapid read by pathologist demonstrated lymphocytes with extrinsic necrosis.  Multiple additional biopsies were taken and sent for final pathology.  4R needle biopsy tissue was also sent for fungal and mycobacterial cultures as well as flow cytometry.  He  tolerated procedure well in his awake and alert postprocedure after LMA removal.        Complications:  None    Estimated Blood Loss (EBL):  Less than 5 cc    OUTCOME: Patient tolerated treatment/procedure well without complication and is now ready for discharge.         Condition:  Good        Disposition:  Hospital floor bed      Karla Suarez MD, FCCP  Pulmonary/critical care

## 2024-10-02 NOTE — TRANSFER OF CARE
"Anesthesia Transfer of Care Note    Patient: Gerald J Lejeune    Procedure(s) Performed: Procedure(s) (LRB):  ENDOBRONCHIAL ULTRASOUND (EBUS)  with FLURO (N/A)  BRONCHOSCOPY, WITH TRANSBRONCHIAL BIOPSY (Right)    Patient location: [facility] Surgical Floor    Anesthesia Type: general    Transport from OR: Transported from OR on 2-3 L/min O2 by NC with adequate spontaneous ventilation    Post pain: adequate analgesia    Post assessment: no apparent anesthetic complications and tolerated procedure well    Post vital signs: stable    Level of consciousness: awake and alert    Complications: none    Transfer of care protocol was followed      Last vitals: Visit Vitals  /76   Pulse 84   Temp 36.8 °C (98.3 °F) (Oral)   Resp 17   Ht 5' 3" (1.6 m)   Wt 82.3 kg (181 lb 8 oz)   SpO2 (!) 94%   BMI 32.15 kg/m²     "

## 2024-10-02 NOTE — ANESTHESIA PREPROCEDURE EVALUATION
"                                                                                                             10/02/2024  Gerald J Lejeune is a 79 y.o., male.who presents with recurrent Large Right pleural effusion.with Mediastinal Adenopathy.  Diagnosis: Adenopathy [R59.9]   Pre-op diagnosis: Adenopathy [R59.9]     The pt. comes to [facility] / Pulmonary Dept. for the noted procedure under GA/LMA .  Case: [id number] Date/Time: 10/02/24 0930   Procedure: ENDOBRONCHIAL ULTRASOUND (EBUS)  with FLURO (Chest)   Anesthesia type: General       PMHx:  Problem List  Current as of 10/02/24 1104  Abdominal aortic aneurysm (AAA) Arterial thrombosis   Benign prostatic hyperplasia with urinary obstruction COPD (chronic obstructive pulmonary disease)   Dyslipidemia Dyssomnia   Essential hypertension Herniated lumbar intervertebral disc   Low back pain PAD (peripheral artery disease)   Seizure Spondylosis with myelopathy     Other Medical History   Adenopathy Obesity   Seizures Oxygen dependent   Dementia Depression   Anxiety Sleep apnea   Hypertension BPH (benign prostatic hyperplasia)   Anesthesia complication Hyperlipidemia   PAD (peripheral artery disease) Shortness of breath   Walker as ambulation aid COPD (chronic obstructive pulmonary disease)   Post-obstructive pneumonia due to foreign body aspiration          PSHx:Surgical History:  INSERTION, STENT, BARE METAL, ARTERY, PERIPHERAL BELOW KNEE AMPUTATION OF LOWER EXTREMITY   THORACENTESIS        Vital signs:  Pre Vitals  Current as of 10/02/24 1104  BP: 118/76 Pulse: 84   Resp: 17 SpO2: 94   Temp: 36.8 °C (98.3 °F)   Height: 5' 3" (1.6 m) (10/01/24) Weight: 82.3 kg (181 lb 8 oz) (10/01/24)   BMI: 32.2 IBW: 56.9 kg (125 lb 6.4 oz)   Last edited 10/02/24 0700 by ABDIEL      Lab Data:      Echo:      EKG:        Pre-op Assessment    I have reviewed the Patient Summary Reports.     I have reviewed the Nursing Notes. I have reviewed the NPO Status.   I have reviewed the Medications. "     Review of Systems  Anesthesia Hx:  No problems with previous Anesthesia                Social:  Non-Smoker       Hematology/Oncology:  Hematology Normal   Oncology Normal                                   EENT/Dental:  EENT/Dental Normal           Cardiovascular:  Exercise tolerance: good   Hypertension                Functional Capacity good / => 4 METS      Shortness of Breath                    Hypertension         Pulmonary:  Pneumonia COPD   Shortness of breath  Sleep Apnea    Chronic Obstructive Pulmonary Disease (COPD):           Obstructive Sleep Apnea (ADAM).       Pulmonary Infection:  Pneumonia.     Renal/:  Renal/ Normal                 Hepatic/GI:  Hepatic/GI Normal                 Musculoskeletal:  Arthritis        Arthritis          Neurological:       Seizures        Arthritis      Seizure Disorder                          Endocrine:  Endocrine Normal            Dermatological:  Skin Normal    Psych:  Psychiatric History                Physical Exam  General: Alert, Oriented, Well nourished and Cooperative    Airway:  Mallampati: II   Mouth Opening: Normal  TM Distance: Normal  Tongue: Normal  Neck ROM: Normal ROM    Dental:  Intact    Chest/Lungs:  Clear to auscultation, Normal Respiratory Rate    Heart:  Rate: Normal  Rhythm: Regular Rhythm      Anesthesia Plan  Type of Anesthesia, risks & benefits discussed:    Anesthesia Type: Gen Supraglottic Airway, Gen Natural Airway  Intra-op Monitoring Plan: Standard ASA Monitors  Post Op Pain Control Plan: multimodal analgesia and IV/PO Opioids PRN  Induction:  IV  Informed Consent: Informed consent signed with the Patient and all parties understand the risks and agree with anesthesia plan.  All questions answered.   ASA Score: 4  Day of Surgery Review of History & Physical: H&P Update referred to the surgeon/provider.    Ready For Surgery From Anesthesia Perspective.     .

## 2024-10-02 NOTE — PROGRESS NOTES
[facility]  Hospital Medicine Progress Note        Chief Complaint: Inpatient Follow-up     HPI:   79-year-old male admitted on 10/01/2024 to hospital medicine services due to worsening shortness a breath over the last few days. Patient was noted mediastinal lymphadenopathy and known right-sided pleural effusion. He was pre he was thoracentesis proximally 1 week ago. He was discharged on supplemental O2. He was wife is at the bedside helping provide history as the patient does have some chronic dementia. She reports that they have had to continue increasing his home O2 to its max levels due to dyspnea. In the emergency room he was placed on 10 L OxyMask with oxygen saturation in the upper 90s. This has been weaned down to about 4 L. ER physician spoke to his treating pulmonologist. They were planning on outpatient bronchoscopy for further investigation of his lymphadenopathy and pleural effusion but plan to see the patient in the hospital since he was being admitted. Plan for bronch possibly tomorrow with thoracentesis. Patient was currently appears comfortable. Labs reviewed and stable. His previous thoracentesis did not show any malignant cells which I did inform the patient was wife. He has been holding his Plavix and Xarelto for approximately 5 days in preparation for upcoming bronch.     Interval Hx:  Patient was appears much more comfortable this morning.  Remains on high-flow oxygen.  NPO for bronch and possible thoracentesis.  Lightheaded bedside.  Vital signs stable..    Objective/physical exam:  General appearance: Well-developed, well-nourished male in no apparent distress.  HENT: Atraumatic head. Moist mucous membranes of oral cavity.  Eyes: Normal extraocular movements.   Neck: Supple.   Lungs:  Mild respiratory distress, on OxyMask, decreased breath  Sounds on right Heart: Regular rate and rhythm. S1 and S2 present with no murmurs/gallop/rub. No pedal edema. No JVD  present.   Abdomen: Soft, non-distended, non-tender. No rebound tenderness/guarding. Bowel sounds are normal.   Extremities: No cyanosis, clubbing, or edema.  Skin: No Rash.   Neuro: Motor and sensory exams grossly intact. Good tone. Muscle strength 5/5 in all 4 extremities  Psych/mental status: Appropriate mood and affect. Responds appropriately to questions.     VITAL SIGNS: 24 HRS MIN & MAX LAST   Temp  Min: 97.4 °F (36.3 °C)  Max: 98.1 °F (36.7 °C) 98.1 °F (36.7 °C)   BP  Min: 112/66  Max: 131/70 125/75   Pulse  Min: 75  Max: 81  81   Resp  Min: 17  Max: 25 17   SpO2  Min: 84 %  Max: 97 % (!) 94 %       Recent Labs   Lab 10/01/24  1504 10/02/24  0510   WBC 10.97 8.86   RBC 4.28* 4.16*   HGB 12.5* 12.3*   HCT 38.1* 36.4*   MCV 89.0 87.5   MCH 29.2 29.6   MCHC 32.8* 33.8   RDW 12.6 12.5    196   MPV 10.7* 10.3       Recent Labs   Lab 10/01/24  1504 10/02/24  0510    140   K 4.1 3.9    100   CO2 31 31   BUN 9.1 9.2   CREATININE 0.65* 0.66*   CALCIUM 8.8 8.8   ALBUMIN 2.8* 2.7*   ALKPHOS 75 73   ALT 17 15   AST 15 12   BILITOT 0.4 0.5          Microbiology Results (last 7 days)       ** No results found for the last 168 hours. **             Radiology:  X-Ray Chest 1 View  Narrative: EXAMINATION:  XR CHEST 1 VIEW    CLINICAL HISTORY:  shortness of breath;    TECHNIQUE:  One view    COMPARISON:  March 22, 2024.    FINDINGS:  Cardiopericardial silhouette overall appearance similar.  There is now complete opacification of the right hemithorax.  Left lung is clear with the exception of basilar some atelectasis.  No left pleural effusion.  No pneumothorax.  Impression: Complete opacification of the right hemithorax which suggest accumulation of large amount of fluid within the pleural space causing lung compressive atelectasis or central obstructive cause.    Electronically signed by: Mynor Virk  Date:    10/01/2024  Time:    15:21        Medications:  Scheduled Meds:   diltiaZEM  120 mg Oral Daily     EScitalopram oxalate  5 mg Oral Daily    fluticasone propionate  1 spray Each Nostril Daily    ipratropium  2 spray Each Nostril TID    levETIRAcetam  500 mg Oral BID    memantine  10 mg Oral BID    tamsulosin  1 capsule Oral Daily     Continuous Infusions:  PRN Meds:.  Current Facility-Administered Medications:     HYDROcodone-acetaminophen, 1 tablet, Oral, Q6H PRN    LORazepam, 0.5 mg, Oral, Q12H PRN    melatonin, 6 mg, Oral, Nightly PRN    sodium chloride 0.9%, 10 mL, Intravenous, PRN        Assessment/Plan:   Recurrent right-sided pleural effusion   Mediastinal lymphadenopathy   Essential hypertension   Chronic dementia   Hyperlipidemia   History of COPD    NPO for bronchoscopy this morning.    Will follow up recommendations from Pulmonary   Plavix and Xarelto were on hold   Appears comfortable.  Wean O2 as tolerated   Resume his home medication regimen as well.    Critical care diagnosis: acute hypoxemic respiratory failure requiring high-flow oxygen  Critical care interventions: hands on evaluation, review of labs/radiographs/records and discussions with family  Critical care time spent: >32 minutes    Afternoon update:  S/p thoracentesis removing about 1.1L of fluid.  Weaned down to nasal canula.    I discussed the case with pulmonary.  Highly suspicious for endobronchial carcinoma.  Biopsies sent off.  Discussed with oncology and will set him up in clinic next week. Will monitor for now but if comfortable, likely home in the morning          Andi Chow MD   10/02/2024     All diagnosis and differential diagnosis have been reviewed; assessment and plan has been documented; I have personally reviewed the labs and test results that are presently available; I have reviewed the patients medication list; I have reviewed the consulting providers response and recommendations. I have reviewed or attempted to review medical records based upon their availability    All of the patient's questions have been   addressed and answered. Patient's is agreeable to the above stated plan. I will continue to monitor closely and make adjustments to medical management as needed.  _____________________________________________________________________

## 2024-10-02 NOTE — ANESTHESIA POSTPROCEDURE EVALUATION
Anesthesia Post Evaluation    Patient: Gerald J Lejeune    Procedure(s) Performed: Procedure(s) (LRB):  ENDOBRONCHIAL ULTRASOUND (EBUS)  with FLURO (N/A)  BRONCHOSCOPY, WITH TRANSBRONCHIAL BIOPSY (Right)    Final Anesthesia Type: general      Patient location during evaluation: PACU  Patient participation: Yes- Able to Participate  Level of consciousness: awake and alert and oriented  Post-procedure vital signs: reviewed and stable  Pain management: adequate  Airway patency: patent  ADAM mitigation strategies: Verification of full reversal of neuromuscular block  PONV status at discharge: No PONV  Anesthetic complications: no      Cardiovascular status: blood pressure returned to baseline and stable  Respiratory status: spontaneous ventilation and unassisted  Hydration status: euvolemic  Follow-up not needed.  Comments: [facility]              Vitals Value Taken Time   /77 10/02/24 1415   Temp 36.7 °C (98.1 °F) 10/02/24 1415   Pulse 95 10/02/24 1415   Resp 18 10/02/24 1415   SpO2 91 % 10/02/24 1415         No case tracking events are documented in the log.      Pain/Alem Score: No data recorded

## 2024-10-02 NOTE — PLAN OF CARE
10/02/24 1507   Discharge Assessment   Assessment Type Discharge Planning Assessment   Confirmed/corrected address, phone number and insurance Yes   Confirmed Demographics Correct on Facesheet   Source of Information family;patient   Communicated PORSHA with patient/caregiver Date not available/Unable to determine   Reason For Admission SOB, recurrent pleural effusion,   People in Home significant other   Do you expect to return to your current living situation? Yes   Do you have help at home or someone to help you manage your care at home? Yes   Who are your caregiver(s) and their phone number(s)? [place] significant other [phone] daughter has POA   Prior to hospitilization cognitive status: Alert/Oriented   Current cognitive status: Alert/Oriented   Walking or Climbing Stairs Difficulty yes   Walking or Climbing Stairs ambulation difficulty, requires equipment;stair climbing difficulty, requires equipment;transferring difficulty, requires equipment   Mobility Management cane, scooter, walker   Dressing/Bathing Difficulty yes   Dressing/Bathing bathing difficulty, requires equipment;dressing difficulty, requires equipment   Dressing/Bathing Management shower chair   Equipment Currently Used at Home none   Readmission within 30 days? Yes   Patient currently being followed by outpatient case management? No   Do you currently have service(s) that help you manage your care at home? Yes   Name and Contact number of agency [facility]   Is the pt/caregiver preference to resume services with current agency Yes   Do you take prescription medications? Yes   Do you have prescription coverage? Yes   Coverage Medicare   Do you have any problems affording any of your prescribed medications? No   Is the patient taking medications as prescribed? yes   Who is going to help you get home at discharge? significant other   How do you get to doctors appointments? family or friend will provide   Are you on dialysis? No   Do you  take coumadin? No   Discharge Plan A Home Health   Discharge Plan B Home Health   DME Needed Upon Discharge  none   Discharge Plan discussed with: Patient   Transition of Care Barriers None

## 2024-10-02 NOTE — ANESTHESIA POSTPROCEDURE EVALUATION
Anesthesia Post Evaluation    Patient: Gerald J Lejeune    Procedure(s) Performed: Procedure(s) (LRB):  ENDOBRONCHIAL ULTRASOUND (EBUS)  with FLURO (N/A)  BRONCHOSCOPY, WITH TRANSBRONCHIAL BIOPSY (Right)    Final Anesthesia Type: general      Patient location during evaluation: PACU  Patient participation: Yes- Able to Participate  Level of consciousness: awake and alert and oriented  Post-procedure vital signs: reviewed and stable  Pain management: adequate  Airway patency: patent  ADAM mitigation strategies: Verification of full reversal of neuromuscular block  PONV status at discharge: No PONV  Anesthetic complications: no      Cardiovascular status: blood pressure returned to baseline and stable  Respiratory status: spontaneous ventilation and unassisted  Hydration status: euvolemic  Follow-up not needed.  Comments: [facility]              Vitals Value Taken Time   /77 10/02/24 1415   Temp 36.7 °C (98.1 °F) 10/02/24 1415   Pulse 95 10/02/24 1415   Resp 18 10/02/24 1415   SpO2 91 % 10/02/24 1415         No case tracking events are documented in the log.      Pain/Alem Score: No data recorded

## 2024-10-03 LAB — PSYCHE PATHOLOGY RESULT: NORMAL

## 2024-10-03 PROCEDURE — 21400001 HC TELEMETRY ROOM

## 2024-10-03 PROCEDURE — 94760 N-INVAS EAR/PLS OXIMETRY 1: CPT

## 2024-10-03 PROCEDURE — 27000221 HC OXYGEN, UP TO 24 HOURS

## 2024-10-03 PROCEDURE — 5A09357 ASSISTANCE WITH RESPIRATORY VENTILATION, LESS THAN 24 CONSECUTIVE HOURS, CONTINUOUS POSITIVE AIRWAY PRESSURE: ICD-10-PCS | Performed by: INTERNAL MEDICINE

## 2024-10-03 PROCEDURE — 99900035 HC TECH TIME PER 15 MIN (STAT)

## 2024-10-03 PROCEDURE — 25000003 PHARM REV CODE 250: Performed by: HOSPITALIST

## 2024-10-03 PROCEDURE — 25000242 PHARM REV CODE 250 ALT 637 W/ HCPCS: Performed by: HOSPITALIST

## 2024-10-03 RX ORDER — FUROSEMIDE 20 MG/1
20 TABLET ORAL DAILY
Status: DISCONTINUED | OUTPATIENT
Start: 2024-10-04 | End: 2024-10-04 | Stop reason: HOSPADM

## 2024-10-03 RX ADMIN — FLUTICASONE PROPIONATE 50 MCG: 50 SPRAY, METERED NASAL at 10:10

## 2024-10-03 RX ADMIN — MEMANTINE 10 MG: 5 TABLET ORAL at 09:10

## 2024-10-03 RX ADMIN — IPRATROPIUM BROMIDE 2 SPRAY: 42 SPRAY, METERED NASAL at 04:10

## 2024-10-03 RX ADMIN — TAMSULOSIN HYDROCHLORIDE 0.4 MG: 0.4 CAPSULE ORAL at 10:10

## 2024-10-03 RX ADMIN — LEVETIRACETAM 500 MG: 500 TABLET, FILM COATED ORAL at 09:10

## 2024-10-03 RX ADMIN — DILTIAZEM HYDROCHLORIDE 120 MG: 60 TABLET, FILM COATED ORAL at 10:10

## 2024-10-03 RX ADMIN — IPRATROPIUM BROMIDE 2 SPRAY: 42 SPRAY, METERED NASAL at 09:10

## 2024-10-03 RX ADMIN — MEMANTINE 10 MG: 5 TABLET ORAL at 10:10

## 2024-10-03 RX ADMIN — LEVETIRACETAM 500 MG: 500 TABLET, FILM COATED ORAL at 10:10

## 2024-10-03 RX ADMIN — ESCITALOPRAM OXALATE 5 MG: 5 TABLET, FILM COATED ORAL at 10:10

## 2024-10-03 RX ADMIN — IPRATROPIUM BROMIDE 2 SPRAY: 42 SPRAY, METERED NASAL at 10:10

## 2024-10-03 NOTE — PROGRESS NOTES
[facility] - 5th Floor Med Surg  Pulmonary Critical Care Note    Patient Name: Gerald J Lejeune  MRN: [id number]  Admission Date: 10/1/2024  Hospital Length of Stay: 2 days  Code Status: Full Code  Attending Provider: Ra Soria MD  Primary Care Provider: Shlomo Leach MD     Subjective:     HPI:   This is a 79-year-old male with a past medical history as outlined below who presented to the hospital on 10/01/2024 with increasing shortness of breath.  He was hospitalized 2 weeks ago with new onset seizures and at that time was found to have a right-sided pleural effusion and bulky mediastinal lymphadenopathy.  He underwent a right-sided thoracentesis on 09/21/2024, pleural fluid was lymphocytic exudative and pathology was negative for malignancy.  His Plavix and Xarelto were held in preparation for an EBUS that was scheduled as an outpatient for today but unfortunately was readmitted earlier and found to have re accumulation of pleural fluid.     Hospital Course/Significant events:  10/2 -- repeat right-sided thoracentesis for removal of 1100 cc of pleural fluid and fiberoptic bronchoscopy with endobronchial biopsies of the right upper lobe and EBUS guided biopsies of the right paratracheal 4R node    24 Hour Interval History:  Doing well on 4 L. awaiting pathology.    Past Medical History:   Diagnosis Date    Adenopathy     Anesthesia complication     takes longer to wake    Anxiety     BPH (benign prostatic hyperplasia)     COPD (chronic obstructive pulmonary disease)     Dementia     Depression     Hyperlipidemia     Hypertension     Obesity     Oxygen dependent     4L/NC    PAD (peripheral artery disease)     Post-obstructive pneumonia due to foreign body aspiration     Levaquin    Seizures     Shortness of breath     Sleep apnea     CPAP    Walker as ambulation aid        Past Surgical History:   Procedure Laterality Date    BELOW KNEE AMPUTATION OF LOWER EXTREMITY Right     INSERTION,  STENT, BARE METAL, ARTERY, PERIPHERAL  2019    THORACENTESIS  09/21/2024       Social History     Socioeconomic History    Marital status:    Tobacco Use    Smoking status: Former     Types: Cigarettes    Smokeless tobacco: Never   Substance and Sexual Activity    Alcohol use: Yes     Alcohol/week: 2.0 standard drinks of alcohol     Types: 2 Cans of beer per week    Drug use: Never     Social Drivers of Health     Financial Resource Strain: Low Risk  (10/1/2024)    Overall Financial Resource Strain (CARDIA)     Difficulty of Paying Living Expenses: Not hard at all   Food Insecurity: No Food Insecurity (10/1/2024)    Hunger Vital Sign     Worried About Running Out of Food in the Last Year: Never true     Ran Out of Food in the Last Year: Never true   Transportation Needs: No Transportation Needs (10/1/2024)    TRANSPORTATION NEEDS     Transportation : No   Stress: No Stress Concern Present (10/1/2024)    [employer] - Occupational Stress Questionnaire     Feeling of Stress : Only a little   Housing Stability: Low Risk  (10/1/2024)    Housing Stability Vital Sign     Unable to Pay for Housing in the Last Year: No     Homeless in the Last Year: No           Current Outpatient Medications   Medication Instructions    clopidogreL (PLAVIX) 75 mg, Daily    diltiaZEM (CARDIZEM) 120 MG tablet 1 tablet, Daily    EScitalopram oxalate (LEXAPRO) 5 mg, Daily    fluticasone propionate (FLONASE) 50 mcg/actuation nasal spray 1 spray, Daily    HYDROcodone-acetaminophen (NORCO)  mg per tablet 1 tablet, Oral, Every 6 hours PRN    ipratropium (ATROVENT) 42 mcg (0.06 %) nasal spray 2 sprays, 3 times daily    levETIRAcetam (KEPPRA) 500 mg, Oral, 2 times daily    LORazepam (ATIVAN) 0.5 mg, Oral, Every 12 hours PRN    memantine (NAMENDA) 10 mg, 2 times daily    tamsulosin (FLOMAX) 0.4 mg Cap 1 capsule, Daily    XARELTO 20 mg, Oral, Daily       Current Inpatient Medications   diltiaZEM  120 mg Oral  Daily    EScitalopram oxalate  5 mg Oral Daily    fluticasone propionate  1 spray Each Nostril Daily    ipratropium  2 spray Each Nostril TID    levETIRAcetam  500 mg Oral BID    memantine  10 mg Oral BID    tamsulosin  1 capsule Oral Daily       Current Intravenous Infusions        Review of Systems   Respiratory:  Positive for shortness of breath.           Objective:       Intake/Output Summary (Last 24 hours) at 10/3/2024 0912  Last data filed at 10/3/2024 0555  Gross per 24 hour   Intake 200 ml   Output 450 ml   Net -250 ml         Vital Signs (Most Recent):  Temp: 98.1 °F (36.7 °C) (10/03/24 0747)  Pulse: 77 (10/03/24 0747)  Resp: 18 (10/03/24 0747)  BP: 122/70 (10/03/24 0747)  SpO2: (!) 94 % (10/03/24 0747)  Body mass index is 32.15 kg/m².  Weight: 82.3 kg (181 lb 8 oz) (patient was 183 at the doctors office yesterday per Ms. Padillay) Vital Signs (24h Range):  Temp:  [97.6 °F (36.4 °C)-98.1 °F (36.7 °C)] 98.1 °F (36.7 °C)  Pulse:  [75-96] 77  Resp:  [18-23] 18  SpO2:  [90 %-98 %] 94 %  BP: ()/(56-77) 122/70     Physical Exam  Vitals reviewed.   Constitutional:       Appearance: Normal appearance.   HENT:      Head: Normocephalic and atraumatic.   Cardiovascular:      Rate and Rhythm: Normal rate.      Heart sounds: Normal heart sounds.   Pulmonary:      Effort: Pulmonary effort is normal.      Breath sounds: Normal breath sounds.   Abdominal:      Palpations: Abdomen is soft.   Musculoskeletal:      Cervical back: Neck supple.   Neurological:      General: No focal deficit present.      Mental Status: He is alert and oriented to person, place, and time.           Lines/Drains/Airways       Peripheral Intravenous Line  Duration                  Peripheral IV - Single Lumen 10/01/24 1508 18 G Anterior;Distal;Left Upper Arm 1 day                    Significant Labs:    Lab Results   Component Value Date    WBC 8.86 10/02/2024    HGB 12.3 (L) 10/02/2024    HCT 36.4 (L) 10/02/2024    MCV 87.5 10/02/2024    PLT  "196 10/02/2024           BMP  Lab Results   Component Value Date     10/02/2024    K 3.9 10/02/2024    CO2 31 10/02/2024    BUN 9.2 10/02/2024    CREATININE 0.66 (L) 10/02/2024    CALCIUM 8.8 10/02/2024    AGAP 9.0 10/02/2024         ABG  No results for input(s): "PH", "PO2", "PCO2", "HCO3", "POCBASEDEF" in the last 168 hours.        Significant Imaging:  I have reviewed the pertinent imaging within the past 24 hours.  X-Ray Chest 1 View  Narrative: EXAMINATION:  XR CHEST 1 VIEW    CLINICAL HISTORY:  dyspnea;    TECHNIQUE:  Single frontal view of the chest was performed.    COMPARISON:  10/02/2024 at 10:19 hours    FINDINGS:  LINES AND TUBES: None    MEDIASTINUM AND JABARI: Cardiac silhouette is enlarged.    LUNGS: Persistent right perihilar and basilar opacities.    PLEURA:Trace right pleural effusion, similar to previous.No pneumothorax.    OTHER: No acute osseous abnormality.  Impression: Appearance very similar to prior.    Electronically signed by: Maritza Laguerre  Date:    10/02/2024  Time:    17:41  X-Ray Chest AP Portable  Narrative: EXAMINATION:  XR CHEST AP PORTABLE    CLINICAL HISTORY:  post right thoracentesis;    TECHNIQUE:  Single frontal view of the chest was performed.    COMPARISON:  10/01/2024    FINDINGS:  LINES AND TUBES: None    MEDIASTINUM AND JABARI: Cardiac silhouette is at the upper limits of normal, likely exaggerated by portable technique.    LUNGS: Improved aeration of the right lung following thoracentesis.  Persistent perihilar opacity.    PLEURA:Small right pleural effusion, significantly improved from previous.No pneumothorax.    BONES: No acute osseous abnormality.  Impression: Interval thoracentesis with significant improvement of aeration of the right lung, persistent perihilar opacity.  No appreciable pneumothorax.    Electronically signed by: Maritza Laguerre  Date:    10/02/2024  Time:    11:40          Assessment/Plan:     Assessment  Large right sided recurrent pleural " effusion with mediastinal lymphadenopathy -- s/p thoracentesis on 9/21 and repeat thoracentesis on 10/02 and EBUS      Plan  -Awaiting pathology   -Wean o2 as tolerated        CLEMENTINA Campos  Pulmonary Critical Care Medicine  [facility] - 5th Floor Med Surg  DOS: 10/03/2024

## 2024-10-03 NOTE — PROGRESS NOTES
[facility] Hospital Medicine Progress Note        Chief Complaint: Inpatient Follow-up for SOB    HPI:   The Pt  is a 79-year-old male with a PMHx of  essential hypertension, AAA, BPH, COPD, Home O2 at 3L/min, former heavy smoker 3 PPD x 50 years, stopped just a year ago, PAD/PVD on Plavix and Xarelto s/p right BKA. Recently hospitalized on 9/19/24 for new onset seizure disorder and found to have  Rt lung collapse due to mucous plugging versus endobronchial lesion, Rt pleural effusion s/p thoracentesis on 9/21/24 with plan for outpatient bronchoscopy, however returned  to the hospital on 10/01/2024 with increasing shortness of breath. Pleural fluid cytology from  09/21/2024  was negative for malignancy. His Plavix and Xarelto were held in preparation for an EBUS that was scheduled as an outpatient for 10/1/24. Pt noted to require higher oxygen flow on admission. Pulmonology was consulted. On 10/2/24 Pt underwent repeat Rt thoracentesis with removal of 1100 cc of serosanguineous thin pleural fluid and Fiberoptic bronchoscopy with endobronchial biopsies right upper lobe, and EBUS guided biopsies of right paratracheal (4R) adenopathy.  Pt tolerated procedure well without immediate post procedure complications. Oxygen demand decreased to baseline 4 L/min. Palliative Medicine was consulted to discussed ACP, and GOC.       Interval Hx:   S/P Rt thora and bronchoscopy yesterday.  Awake , alert, oriented at this time. Sitting in the bedside chair. SO and grand daughter in the room. No distress noted. O2 at 4L/min via NC    Afebrile. Hemodynamics are stable   No AM labs today     Case was discussed with patient's nurse and  on the floor.    Objective/physical exam:  General: In no acute distress, afebrile, on NC   Chest: Absent breath sound Rt lung, Left lung is clear   Heart: RRR, +S1, S2, no appreciable murmur  Abdomen: Soft, nontender, BS +  MSK: Warm, no lower extremity edema,  no clubbing or cyanosis  Neurologic: Awake and oriented to self, place and person. Move all ext spontaneously     VITAL SIGNS: 24 HRS MIN & MAX LAST   Temp  Min: 97.6 °F (36.4 °C)  Max: 98.1 °F (36.7 °C) 97.8 °F (36.6 °C)   BP  Min: 96/56  Max: 122/70 110/70   Pulse  Min: 75  Max: 82  79   Resp  Min: 18  Max: 23 18   SpO2  Min: 91 %  Max: 98 % (!) 93 %     I have reviewed the following labs:  Recent Labs   Lab 10/01/24  1504 10/02/24  0510   WBC 10.97 8.86   RBC 4.28* 4.16*   HGB 12.5* 12.3*   HCT 38.1* 36.4*   MCV 89.0 87.5   MCH 29.2 29.6   MCHC 32.8* 33.8   RDW 12.6 12.5    196   MPV 10.7* 10.3     Recent Labs   Lab 10/01/24  1504 10/02/24  0510    140   K 4.1 3.9    100   CO2 31 31   BUN 9.1 9.2   CREATININE 0.65* 0.66*   CALCIUM 8.8 8.8   ALBUMIN 2.8* 2.7*   ALKPHOS 75 73   ALT 17 15   AST 15 12   BILITOT 0.4 0.5     Microbiology Results (last 7 days)       Procedure Component Value Units Date/Time    Fungal Culture [[id number]] Collected: 10/02/24 1215    Order Status: Sent Specimen: Lymph node 4R Updated: 10/02/24 1226    Mycobacteria and Nocardia Culture [[id number]] Collected: 10/02/24 1215    Order Status: Sent Specimen: Fine Needle Aspirate from Lymph node 4R Updated: 10/02/24 1220             See below for Radiology    Assessment/Plan:  Recurrent Rt sided large pleural effusion, s/p repeat thoracentesis 10/2/24  Rt upper lobe endobronchial lesion s/p bronchoscopy and EBUS and biopsy on 10/2/24  Bulky Rt paratracheal lymphadenopathy s/p biopsy   Acute on chronic hypoxic resp failure due to above   Mild normocytic anemia   Former heavy smoker   Seizure disorder - recently diagnosed - stable   Encounter for Palliative care     Hx- HTN, AAA, BPH, COPD without exacerbation, PAD/PVD on Plavix and Xarelto s/p right BKA     Plan-   Pt is currently stable from resp stand point with  baseline oxygen demand.   Will check with Pulmonology if Pt would benefit form Pleur X catheter as he has high  risk of rapid re accumulation.   Will consult Palliative Medicine to discuss ACP and GOC.  Follow up final pathology report   Home meds are reviewed and resumed   Will resume DAOC and Plavix shortly if no further plan such as placement of Pleur X catheter         VTE prophylaxis:  SCDs for now     Patient condition:  Fair     Anticipated discharge and Disposition:     Home with Home Health     All diagnosis and differential diagnosis have been reviewed; assessment and plan has been documented; I have personally reviewed the labs and test results that are presently available; I have reviewed the patients medication list; I have reviewed the consulting providers response and recommendations. I have reviewed or attempted to review medical records based upon their availability    All of the patient's questions have been  addressed and answered. Patient's is agreeable to the above stated plan. I will continue to monitor closely and make adjustments to medical management as needed.    Portions of this note dictated using EMR integrated voice recognition software, and may be subject to voice recognition errors not corrected at proofreading. Please contact writer for clarification if needed.   _____________________________________________________________________    Malnutrition Status:    Scheduled Med:   diltiaZEM  120 mg Oral Daily    EScitalopram oxalate  5 mg Oral Daily    fluticasone propionate  1 spray Each Nostril Daily    ipratropium  2 spray Each Nostril TID    levETIRAcetam  500 mg Oral BID    memantine  10 mg Oral BID    tamsulosin  1 capsule Oral Daily      Continuous Infusions:     PRN Meds:    Current Facility-Administered Medications:     albuterol-ipratropium, 3 mL, Nebulization, Q4H PRN    HYDROcodone-acetaminophen, 1 tablet, Oral, Q6H PRN    LORazepam, 0.5 mg, Oral, Q12H PRN    melatonin, 6 mg, Oral, Nightly PRN    sodium chloride 0.9%, 10 mL, Intravenous, PRN         Matias Palacio MD  Department of  [facility] Medicine   [facility]   10/03/2024

## 2024-10-03 NOTE — CONSULTS
Inpatient consult to Palliative Care  Consult performed by: Jojo Tran FNP  Consult ordered by: Matias Palacio MD  Reason for consult: Goals of Care and Advance Care Planning      Patient Name: Gerald J Lejeune   MRN: [id number]   Admission Date: 10/1/2024   Hospital Length of Stay: 2   Attending Provider: Matias Palacio MD   Consulting Provider: Palliative Care Team   Reason for Consult: Goals of Care  Primary Care Physician: Shlomo Leach MD     Principal Problem: <principal problem not specified>     Patient information was obtained from patient, relative(s), and ER records.      Final diagnoses:  [R06.02] SOB (shortness of breath)  [J90] Recurrent pleural effusion on right (Primary)     Patient Summary:  The Pt  is a 79-year-old male with a PMHx of  essential hypertension, AAA, BPH, COPD, Home O2 at 3L/min, former heavy smoker 3 PPD x 50 years, stopped just a year ago, PAD/PVD on Plavix and Xarelto s/p right BKA. Recently hospitalized on 9/19/24 for new onset seizure disorder and found to have  Rt lung collapse due to mucous plugging versus endobronchial lesion, Rt pleural effusion s/p thoracentesis on 9/21/24 with plan for outpatient bronchoscopy, however returned  to the hospital on 10/01/2024 with increasing shortness of breath. Pleural fluid cytology from  09/21/2024  was negative for malignancy. His Plavix and Xarelto were held in preparation for an EBUS that was scheduled as an outpatient for 10/1/24. Pt noted to require higher oxygen flow on admission. Pulmonology was consulted. On 10/2/24 Pt underwent repeat Rt thoracentesis with removal of 1100 cc of serosanguineous thin pleural fluid and Fiberoptic bronchoscopy with endobronchial biopsies right upper lobe, and EBUS guided biopsies of right paratracheal (4R) adenopathy. Pathology pending. Palliative Medicine consulted for ACP and goals of care.     Assessment/Plan:     I reviewed the patient and family's understanding of the  seriousness of the illness and its expected prognosis. We discussed the patient's goals of care and treatment preferences. We discussed the patient's chosen code status as listed. I answered all questions and we formulated a plan including recommendations for symptom management and how to best achieve goals of care.    Patient sitting up in chair, daughter at bedside. Patient history obtained, denies shortness of breath or pain during assessment.      Advance Care Planning     Date: 10/03/2024    Power of   I initiated the process of voluntary advance care planning today and explained the importance of this process to the patient.  I introduced the concept of advance directives to the patient, as well. Then the patient received detailed information about the importance of designating a Health Care Power of  (HCPOA). He was also instructed to communicate with this person about their wishes for future healthcare, should he become sick and lose decision-making capacity. The patient has previously appointed a HCPOA. After our discussion, the patient has decided to complete a HCPOA and has appointed his daughter, health care agent:  Ann Marie Bentley  & health care agent number:  [phone] . I encouraged him to communicate with this person about their wishes for future healthcare, should he become sick and lose decision-making capacity.    [facility]  I engaged the patient and family in a voluntary conversation about advance care planning and we specifically addressed what the goals of care would be moving forward, in light of the patient's change in clinical status, specifically current condition, goals of care and medical wishes.  We did specifically address the patient's likely prognosis, which is fair .  We explored the patient's values and preferences for future care.  The patient and family endorses that what is most important right now is to focus on curative/life-prolongation (regardless of treatment  "burdens), pending pathology. We discussed resuscitation status in detail at this time patient wishes to remain FULL CODE.     Accordingly, we have decided that the best plan to meet the patient's goals includes continuing with treatment     Recommendations:     Spoke to Palliative Team Physician/NP - reviewed patient's current status and plan of care in detail. The following recommendations/orders were given:     Review HCPOA, copy of document obtained and scanned to HIM to be uploaded to patient's chart, copy placed on patient's chart at nurses station.     Reviewed Code Status: FULL CODE  Provided ACP and LaPOST document and encourage continue discussion of medial wishes.     Consult Wilfred     Palliative Team will continue to provided emotional support, education, and make adjustments to plan of care to meet patient's needs throughout hospital stay.         Objective:   /70   Pulse 79   Temp 97.8 °F (36.6 °C) (Oral)   Resp 18   Ht 5' 3" (1.6 m)   Wt 82.3 kg (181 lb 8 oz) Comment: patient was 183 at the doctors office yesterday per Ms. Orourke  SpO2 (!) 93%   BMI 32.15 kg/m²      PAINAD: NA    Physical Exam  Vitals and nursing note reviewed.   Cardiovascular:      Rate and Rhythm: Normal rate.   Pulmonary:      Effort: Pulmonary effort is normal.   Neurological:      Mental Status: He is alert and oriented to person, place, and time.        Review of Symptoms      Symptom Assessment (ESAS 0-10 Scale)  Pain:  0  Dyspnea:  0  Anxiety:  0  Nausea:  0  Depression:  0  Anorexia:  0  Fatigue:  0  Insomnia:  0  Restlessness:  0  Agitation:  0         Living Arrangements:  Lives with friend    Psychosocial/Cultural:   See Palliative Psychosocial Note: Yes  Patient is a 79 year old male,  with two adult children, both live locally. Daughter HCPOA. Patient lives with his close friend. Is a retired   **Primary  to Follow**  Palliative Care  Consult: No    Spiritual:  F " - Mery and Belief:  Y - [employer], consulted  I - Importance:  Y  C - Community:  Y  A - Address in Care:  Y      Advance Care Planning   Advance Directives:   Do Not Resuscitate Status: No    Medical Power of : Yes    Agent's Name:  Ann Marie Bentley   Agent's Contact Number:  [phone]    Decision Making:  Family answered questions and Patient answered questions  Goals of Care: The patient and family endorses that what is most important right now is to focus on curative/life-prolongation (regardless of treatment burdens)    Accordingly, we have decided that the best plan to meet the patient's goals includes continuing with treatment             FAMILY CONTACTS:   Ann Marie Bentley [phone]    Caregiver burden formerly assessed: Yes        LOLI Hurt, BSN-RN, CHPN  Palliative Medicine  [facility] - Palliative Team

## 2024-10-04 VITALS
TEMPERATURE: 98 F | DIASTOLIC BLOOD PRESSURE: 63 MMHG | SYSTOLIC BLOOD PRESSURE: 103 MMHG | RESPIRATION RATE: 18 BRPM | HEART RATE: 86 BPM | BODY MASS INDEX: 32.16 KG/M2 | HEIGHT: 63 IN | WEIGHT: 181.5 LBS | OXYGEN SATURATION: 91 %

## 2024-10-04 PROBLEM — J90 PLEURAL EFFUSION: Status: ACTIVE | Noted: 2024-10-04

## 2024-10-04 LAB
ANION GAP SERPL CALC-SCNC: 7 MEQ/L
BASOPHILS # BLD AUTO: 0.06 X10(3)/MCL
BASOPHILS NFR BLD AUTO: 0.5 %
BUN SERPL-MCNC: 12 MG/DL (ref 8.4–25.7)
CALCIUM SERPL-MCNC: 8.9 MG/DL (ref 8.8–10)
CHLORIDE SERPL-SCNC: 102 MMOL/L (ref 98–107)
CO2 SERPL-SCNC: 31 MMOL/L (ref 23–31)
CREAT SERPL-MCNC: 0.75 MG/DL (ref 0.73–1.18)
CREAT/UREA NIT SERPL: 16
EOSINOPHIL # BLD AUTO: 0 X10(3)/MCL (ref 0–0.9)
EOSINOPHIL NFR BLD AUTO: 0 %
ERYTHROCYTE [DISTWIDTH] IN BLOOD BY AUTOMATED COUNT: 12.5 % (ref 11.5–17)
GFR SERPLBLD CREATININE-BSD FMLA CKD-EPI: >60 ML/MIN/1.73/M2
GLUCOSE SERPL-MCNC: 96 MG/DL (ref 82–115)
HCT VFR BLD AUTO: 36.3 % (ref 42–52)
HGB BLD-MCNC: 12.3 G/DL (ref 14–18)
IMM GRANULOCYTES # BLD AUTO: 0.05 X10(3)/MCL (ref 0–0.04)
IMM GRANULOCYTES NFR BLD AUTO: 0.5 %
LYMPHOCYTES # BLD AUTO: 1.35 X10(3)/MCL (ref 0.6–4.6)
LYMPHOCYTES NFR BLD AUTO: 12.3 %
MAGNESIUM SERPL-MCNC: 1.9 MG/DL (ref 1.6–2.6)
MCH RBC QN AUTO: 30.2 PG (ref 27–31)
MCHC RBC AUTO-ENTMCNC: 33.9 G/DL (ref 33–36)
MCV RBC AUTO: 89.2 FL (ref 80–94)
MONOCYTES # BLD AUTO: 0.85 X10(3)/MCL (ref 0.1–1.3)
MONOCYTES NFR BLD AUTO: 7.7 %
NEUTROPHILS # BLD AUTO: 8.69 X10(3)/MCL (ref 2.1–9.2)
NEUTROPHILS NFR BLD AUTO: 79 %
NRBC BLD AUTO-RTO: 0 %
PHOSPHATE SERPL-MCNC: 2.5 MG/DL (ref 2.3–4.7)
PLATELET # BLD AUTO: 202 X10(3)/MCL (ref 130–400)
PMV BLD AUTO: 10.2 FL (ref 7.4–10.4)
POTASSIUM SERPL-SCNC: 3.9 MMOL/L (ref 3.5–5.1)
PSYCHE PATHOLOGY RESULT: NORMAL
RBC # BLD AUTO: 4.07 X10(6)/MCL (ref 4.7–6.1)
SODIUM SERPL-SCNC: 140 MMOL/L (ref 136–145)
WBC # BLD AUTO: 11 X10(3)/MCL (ref 4.5–11.5)

## 2024-10-04 PROCEDURE — 25000242 PHARM REV CODE 250 ALT 637 W/ HCPCS: Performed by: HOSPITALIST

## 2024-10-04 PROCEDURE — 84100 ASSAY OF PHOSPHORUS: CPT | Performed by: INTERNAL MEDICINE

## 2024-10-04 PROCEDURE — 85025 COMPLETE CBC W/AUTO DIFF WBC: CPT | Performed by: INTERNAL MEDICINE

## 2024-10-04 PROCEDURE — 83735 ASSAY OF MAGNESIUM: CPT | Performed by: INTERNAL MEDICINE

## 2024-10-04 PROCEDURE — 94760 N-INVAS EAR/PLS OXIMETRY 1: CPT

## 2024-10-04 PROCEDURE — 36415 COLL VENOUS BLD VENIPUNCTURE: CPT | Performed by: INTERNAL MEDICINE

## 2024-10-04 PROCEDURE — 25000003 PHARM REV CODE 250: Performed by: HOSPITALIST

## 2024-10-04 PROCEDURE — 25000003 PHARM REV CODE 250: Performed by: INTERNAL MEDICINE

## 2024-10-04 PROCEDURE — 99900031 HC PATIENT EDUCATION (STAT)

## 2024-10-04 PROCEDURE — 80048 BASIC METABOLIC PNL TOTAL CA: CPT | Performed by: INTERNAL MEDICINE

## 2024-10-04 PROCEDURE — 99900035 HC TECH TIME PER 15 MIN (STAT)

## 2024-10-04 PROCEDURE — 27000221 HC OXYGEN, UP TO 24 HOURS

## 2024-10-04 PROCEDURE — 94640 AIRWAY INHALATION TREATMENT: CPT

## 2024-10-04 RX ORDER — FUROSEMIDE 20 MG/1
20 TABLET ORAL DAILY
Qty: 30 TABLET | Refills: 0 | Status: SHIPPED | OUTPATIENT
Start: 2024-10-05 | End: 2024-11-04

## 2024-10-04 RX ADMIN — DILTIAZEM HYDROCHLORIDE 120 MG: 60 TABLET, FILM COATED ORAL at 09:10

## 2024-10-04 RX ADMIN — TAMSULOSIN HYDROCHLORIDE 0.4 MG: 0.4 CAPSULE ORAL at 09:10

## 2024-10-04 RX ADMIN — FLUTICASONE PROPIONATE 50 MCG: 50 SPRAY, METERED NASAL at 09:10

## 2024-10-04 RX ADMIN — ESCITALOPRAM OXALATE 5 MG: 5 TABLET, FILM COATED ORAL at 09:10

## 2024-10-04 RX ADMIN — MEMANTINE 10 MG: 5 TABLET ORAL at 09:10

## 2024-10-04 RX ADMIN — LEVETIRACETAM 500 MG: 500 TABLET, FILM COATED ORAL at 09:10

## 2024-10-04 RX ADMIN — IPRATROPIUM BROMIDE AND ALBUTEROL SULFATE 3 ML: .5; 3 SOLUTION RESPIRATORY (INHALATION) at 08:10

## 2024-10-04 RX ADMIN — FUROSEMIDE 20 MG: 20 TABLET ORAL at 09:10

## 2024-10-04 RX ADMIN — IPRATROPIUM BROMIDE 2 SPRAY: 42 SPRAY, METERED NASAL at 09:10

## 2024-10-04 NOTE — PROGRESS NOTES
[facility] - 5th Floor Med Surg  Pulmonary Critical Care Note    Patient Name: Gerald J Lejeune  MRN: [id number]  Admission Date: 10/1/2024  Hospital Length of Stay: 3 days  Code Status: Full Code  Attending Provider: Matias Palacio MD  Primary Care Provider: Shlomo Leach MD     Subjective:     HPI:   This is a 79-year-old male with a past medical history as outlined below who presented to the hospital on 10/01/2024 with increasing shortness of breath.  He was hospitalized 2 weeks ago with new onset seizures and at that time was found to have a right-sided pleural effusion and bulky mediastinal lymphadenopathy.  He underwent a right-sided thoracentesis on 09/21/2024, pleural fluid was lymphocytic exudative and pathology was negative for malignancy.  His Plavix and Xarelto were held in preparation for an EBUS that was scheduled as an outpatient for today but unfortunately was readmitted earlier and found to have re accumulation of pleural fluid.     Hospital Course/Significant events:  10/2 -- repeat right-sided thoracentesis for removal of 1100 cc of pleural fluid and fiberoptic bronchoscopy with endobronchial biopsies of the right upper lobe and EBUS guided biopsies of the right paratracheal 4R node    24 Hour Interval History:  Doing well on 4 L. awaiting pathology from bronchoscopy.     Past Medical History:   Diagnosis Date    Adenopathy     Anesthesia complication     takes longer to wake    Anxiety     BPH (benign prostatic hyperplasia)     COPD (chronic obstructive pulmonary disease)     Dementia     Depression     Hyperlipidemia     Hypertension     Obesity     Oxygen dependent     4L/NC    PAD (peripheral artery disease)     Post-obstructive pneumonia due to foreign body aspiration     Levaquin    Seizures     Shortness of breath     Sleep apnea     CPAP    Walker as ambulation aid        Past Surgical History:   Procedure Laterality Date    BELOW KNEE AMPUTATION OF LOWER EXTREMITY  Right     BRONCHOSCOPY, WITH TRANSBRONCHIAL BIOPSY Right 10/2/2024    Procedure: BRONCHOSCOPY, WITH TRANSBRONCHIAL BIOPSY;  Surgeon: Lee Suarez Jr., MD, [facility];  Location: [facility] BRONCHOSCOPY LAB;  Service: Pulmonary;  Laterality: Right;  RUL    ENDOBRONCHIAL ULTRASOUND N/A 10/2/2024    Procedure: ENDOBRONCHIAL ULTRASOUND (EBUS)  with FLURO;  Surgeon: Lee Suarez Jr., MD, [facility];  Location: [facility] BRONCHOSCOPY LAB;  Service: Pulmonary;  Laterality: N/A;    INSERTION, STENT, BARE METAL, ARTERY, PERIPHERAL  2019    THORACENTESIS  09/21/2024       Social History     Socioeconomic History    Marital status:    Tobacco Use    Smoking status: Former     Types: Cigarettes    Smokeless tobacco: Never   Substance and Sexual Activity    Alcohol use: Yes     Alcohol/week: 2.0 standard drinks of alcohol     Types: 2 Cans of beer per week    Drug use: Never     Social Drivers of Health     Financial Resource Strain: Low Risk  (10/1/2024)    Overall Financial Resource Strain (CARDIA)     Difficulty of Paying Living Expenses: Not hard at all   Food Insecurity: No Food Insecurity (10/1/2024)    Hunger Vital Sign     Worried About Running Out of Food in the Last Year: Never true     Ran Out of Food in the Last Year: Never true   Transportation Needs: No Transportation Needs (10/1/2024)    TRANSPORTATION NEEDS     Transportation : No   Stress: No Stress Concern Present (10/1/2024)    [employer] - Occupational Stress Questionnaire     Feeling of Stress : Only a little   Housing Stability: Low Risk  (10/1/2024)    Housing Stability Vital Sign     Unable to Pay for Housing in the Last Year: No     Homeless in the Last Year: No           Current Outpatient Medications   Medication Instructions    clopidogreL (PLAVIX) 75 mg, Daily    diltiaZEM (CARDIZEM) 120 MG tablet 1 tablet, Daily    EScitalopram oxalate (LEXAPRO) 5 mg, Daily    fluticasone propionate (FLONASE) 50 mcg/actuation nasal spray 1 spray,  Daily    HYDROcodone-acetaminophen (NORCO)  mg per tablet 1 tablet, Oral, Every 6 hours PRN    ipratropium (ATROVENT) 42 mcg (0.06 %) nasal spray 2 sprays, 3 times daily    levETIRAcetam (KEPPRA) 500 mg, Oral, 2 times daily    LORazepam (ATIVAN) 0.5 mg, Oral, Every 12 hours PRN    memantine (NAMENDA) 10 mg, 2 times daily    tamsulosin (FLOMAX) 0.4 mg Cap 1 capsule, Daily    XARELTO 20 mg, Oral, Daily       Current Inpatient Medications   diltiaZEM  120 mg Oral Daily    EScitalopram oxalate  5 mg Oral Daily    fluticasone propionate  1 spray Each Nostril Daily    furosemide  20 mg Oral Daily    ipratropium  2 spray Each Nostril TID    levETIRAcetam  500 mg Oral BID    memantine  10 mg Oral BID    tamsulosin  1 capsule Oral Daily       Current Intravenous Infusions        Review of Systems   Respiratory:  Positive for shortness of breath.           Objective:       Intake/Output Summary (Last 24 hours) at 10/4/2024 0833  Last data filed at 10/3/2024 1407  Gross per 24 hour   Intake 200 ml   Output 200 ml   Net 0 ml         Vital Signs (Most Recent):  Temp: 97.6 °F (36.4 °C) (10/04/24 0744)  Pulse: 86 (10/04/24 0744)  Resp: 18 (10/03/24 1922)  BP: 136/76 (10/04/24 0744)  SpO2: (!) 89 % (10/04/24 0744)  Body mass index is 32.15 kg/m².  Weight: 82.3 kg (181 lb 8 oz) (patient was 183 at the doctors office yesterday per Ms. Haven) Vital Signs (24h Range):  Temp:  [97.6 °F (36.4 °C)-98 °F (36.7 °C)] 97.6 °F (36.4 °C)  Pulse:  [74-86] 86  Resp:  [18-20] 18  SpO2:  [89 %-94 %] 89 %  BP: (106-136)/(61-76) 136/76     Physical Exam  Vitals reviewed.   Constitutional:       Appearance: Normal appearance.   HENT:      Head: Normocephalic and atraumatic.   Cardiovascular:      Rate and Rhythm: Normal rate.      Heart sounds: Normal heart sounds.   Pulmonary:      Effort: Pulmonary effort is normal.      Breath sounds: Normal breath sounds.   Abdominal:      Palpations: Abdomen is soft.   Musculoskeletal:      Cervical back:  "Neck supple.   Neurological:      General: No focal deficit present.      Mental Status: He is alert and oriented to person, place, and time.           Lines/Drains/Airways       Peripheral Intravenous Line  Duration                  Peripheral IV - Single Lumen 10/01/24 1508 18 G Anterior;Distal;Left Upper Arm 2 days                    Significant Labs:    Lab Results   Component Value Date    WBC 11.00 10/04/2024    HGB 12.3 (L) 10/04/2024    HCT 36.3 (L) 10/04/2024    MCV 89.2 10/04/2024     10/04/2024           BMP  Lab Results   Component Value Date     10/04/2024    K 3.9 10/04/2024    CO2 31 10/04/2024    BUN 12.0 10/04/2024    CREATININE 0.75 10/04/2024    CALCIUM 8.9 10/04/2024    AGAP 7.0 10/04/2024         ABG  No results for input(s): "PH", "PO2", "PCO2", "HCO3", "POCBASEDEF" in the last 168 hours.        Significant Imaging:  I have reviewed the pertinent imaging within the past 24 hours.  X-Ray Chest 1 View  Narrative: EXAMINATION:  XR CHEST 1 VIEW    CLINICAL HISTORY:  dyspnea;    TECHNIQUE:  Single frontal view of the chest was performed.    COMPARISON:  10/02/2024 at 10:19 hours    FINDINGS:  LINES AND TUBES: None    MEDIASTINUM AND JABARI: Cardiac silhouette is enlarged.    LUNGS: Persistent right perihilar and basilar opacities.    PLEURA:Trace right pleural effusion, similar to previous.No pneumothorax.    OTHER: No acute osseous abnormality.  Impression: Appearance very similar to prior.    Electronically signed by: Maritza Laguerre  Date:    10/02/2024  Time:    17:41  X-Ray Chest AP Portable  Narrative: EXAMINATION:  XR CHEST AP PORTABLE    CLINICAL HISTORY:  post right thoracentesis;    TECHNIQUE:  Single frontal view of the chest was performed.    COMPARISON:  10/01/2024    FINDINGS:  LINES AND TUBES: None    MEDIASTINUM AND JABARI: Cardiac silhouette is at the upper limits of normal, likely exaggerated by portable technique.    LUNGS: Improved aeration of the right lung following " thoracentesis.  Persistent perihilar opacity.    PLEURA:Small right pleural effusion, significantly improved from previous.No pneumothorax.    BONES: No acute osseous abnormality.  Impression: Interval thoracentesis with significant improvement of aeration of the right lung, persistent perihilar opacity.  No appreciable pneumothorax.    Electronically signed by: Maritza Laguerre  Date:    10/02/2024  Time:    11:40          Assessment/Plan:     Assessment  Large right sided recurrent pleural effusion with mediastinal lymphadenopathy -- s/p thoracentesis on 9/21 and repeat thoracentesis on 10/02 and EBUS      Plan  -cytology from pleural fluid was negative for malignant cells. Awaiting pathology from bronchoscopy.  -Wean o2 as tolerated; He has home oxygen and was on 4L prior to admission  - Will need to follow up in pulmonary clinic in 2 weeks. Can repeat CXR at that time as well to assess for pleural effusion reaccumulation. If right-sided effusion is worse, we can refer for pleurx catheter placement at that time.        CLEMENTINA Mckenzie  Pulmonary Critical Care Medicine  [facility] - 5th Floor Med Surg  DOS: 10/04/2024

## 2024-10-04 NOTE — PLAN OF CARE
Problem: Adult Inpatient Plan of Care  Goal: Plan of Care Review  Outcome: Progressing  Goal: Patient-Specific Goal (Individualized)  Outcome: Progressing  Goal: Absence of Hospital-Acquired Illness or Injury  Outcome: Progressing  Goal: Optimal Comfort and Wellbeing  Outcome: Progressing  Goal: Readiness for Transition of Care  Outcome: Progressing     Problem: Wound  Goal: Optimal Coping  Outcome: Progressing  Goal: Optimal Functional Ability  Outcome: Progressing  Goal: Absence of Infection Signs and Symptoms  Outcome: Progressing  Goal: Improved Oral Intake  Outcome: Progressing  Goal: Optimal Pain Control and Function  Outcome: Progressing  Goal: Skin Health and Integrity  Outcome: Progressing  Goal: Optimal Wound Healing  Outcome: Progressing     Problem: Skin Injury Risk Increased  Goal: Skin Health and Integrity  Outcome: Progressing     Problem: Coping Ineffective  Goal: Effective Coping  Outcome: Progressing

## 2024-10-05 ENCOUNTER — TELEPHONE (OUTPATIENT)
Dept: PULMONOLOGY | Facility: HOSPITAL | Age: 79
End: 2024-10-05
Payer: MEDICARE

## 2024-10-05 LAB — FUNGUS SPEC CULT: NORMAL

## 2024-10-05 NOTE — TELEPHONE ENCOUNTER
Attempted to call patient multiple times with results of pathology. Spoke with Dr. Juana Blood, her office will arrange follow up next week.

## 2024-10-06 ENCOUNTER — PATIENT MESSAGE (OUTPATIENT)
Dept: ADMINISTRATIVE | Facility: OTHER | Age: 79
End: 2024-10-06
Payer: MEDICARE

## 2024-10-06 NOTE — DISCHARGE SUMMARY
[facility] Hospital Medicine Discharge Summary    Admit Date: 10/1/2024  Discharge Date and Time: 10/4/2024, 02:18 pm  Admitting Physician:  Team  Discharging Physician: Matias Palacio MD.  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 Recently hospitalized on 9/19/24 for new onset seizure disorder and found to have  Rt lung collapse due to mucous plugging versus endobronchial lesion, Rt pleural effusion s/p thoracentesis on 9/21/24 with plan for outpatient bronchoscopy, however returned  to the hospital on 10/01/2024 with increasing shortness of breath. Pleural fluid cytology from  09/21/2024  was negative for malignancy. His Plavix and Xarelto were held in preparation for an EBUS that was scheduled as an outpatient for 10/1/24. Pt noted to require higher oxygen flow on admission. Pulmonology was consulted. On 10/2/24 Pt underwent repeat Rt thoracentesis with removal of 1100 cc of serosanguineous thin pleural fluid and Fiberoptic bronchoscopy with endobronchial biopsies right upper lobe, and EBUS guided  biopsies of right paratracheal (4R) adenopathy.  Pt tolerated procedure well without immediate post procedure complications. Oxygen demand decreased to baseline 4 L/min.     Palliative Medicine was consulted to discussed ACP, and GOC. Pt and family elected Full Code status and wishes to undergo cancer directed treatment once biopsy confirmed. Given Pt is currently stable on baseline oxygen, Pulmonology cleared Pt for discharged with plan to call Pt with biopsy result once available and refer Pt to Oncology clinic. I asked Dr. Jerome regarding Pleur X catheter but he suggested if effusion recurs then will consider. Lasix 20 mg po daily added. Pt deemed stable for discharge to home with Home Health.       Pt was seen and examined on the day of discharge  Vitals:  VITAL SIGNS: 24 HRS MIN & MAX LAST   No data recorded 97.8 °F (36.6 °C)   No data recorded 103/63   No data recorded  86   No data recorded 18   No data recorded (!) 91 %       Physical Exam:    General: In no acute distress, afebrile, on NC   Chest: Absent breath sound Rt lung, Left lung is clear   Heart: RRR, +S1, S2, no appreciable murmur  Abdomen: Soft, nontender, BS +  MSK: Warm, no lower extremity edema, no clubbing or cyanosis  Neurologic: Awake and oriented to self, place and person. Move all ext spontaneously       Procedures Performed: No admission procedures for hospital encounter.     Significant Diagnostic Studies: See Full reports for all details    Recent Labs   Lab 10/01/24  1504 10/02/24  0510 10/04/24  0415   WBC 10.97 8.86 11.00   RBC 4.28* 4.16* 4.07*   HGB 12.5* 12.3* 12.3*   HCT 38.1* 36.4* 36.3*   MCV 89.0 87.5 89.2   MCH 29.2 29.6 30.2   MCHC 32.8* 33.8 33.9   RDW 12.6 12.5 12.5    196 202   MPV 10.7* 10.3 10.2       Recent Labs   Lab 10/01/24  1504 10/02/24  0510 10/04/24  0415    140 140   K 4.1 3.9 3.9    100 102   CO2 31 31 31   BUN 9.1 9.2 12.0   CREATININE 0.65* 0.66* 0.75   CALCIUM 8.8 8.8 8.9   MG  --   --   1.90   ALBUMIN 2.8* 2.7*  --    ALKPHOS 75 73  --    ALT 17 15  --    AST 15 12  --    BILITOT 0.4 0.5  --         Microbiology Results (last 7 days)       Procedure Component Value Units Date/Time    Fungal Culture [[id number]] Collected: 10/02/24 1215    Order Status: Sent Specimen: Lymph node 4R Updated: 10/02/24 1226    Mycobacteria and Nocardia Culture [[id number]] Collected: 10/02/24 1215    Order Status: Sent Specimen: Fine Needle Aspirate from Lymph node 4R Updated: 10/02/24 1220             X-Ray Chest 1 View  Narrative: EXAMINATION:  XR CHEST 1 VIEW    CPT 60027    CLINICAL HISTORY:  follow up Rt pleural effusion;    COMPARISON:  October 2, 2024    FINDINGS:  Examination reveals cardiomediastinal silhouette to be unchanged as compared with the previous exam.    Persistent confluent airspace opacities in the right upper lobe slightly improved as compared with the previous exam.    There is blunting of the right costophrenic angle indicating the presence of a right-sided pleural effusion.    No other significant change as compared with the previous exam  Impression: Some improved aeration in the right upper lobe with persistent confluent airspace opacities.    Right-sided pleural effusion unchanged as compared with the previous exam.    No other significant change    Electronically signed by: Cachorro Porter  Date:    10/04/2024  Time:    10:17         Medication List        START taking these medications      clopidogreL 75 mg tablet  Commonly known as: PLAVIX     furosemide 20 MG tablet  Commonly known as: LASIX  Take 1 tablet (20 mg total) by mouth once daily.            CONTINUE taking these medications      ATIVAN 0.5 MG tablet  Generic drug: LORazepam     diltiaZEM 120 MG tablet  Commonly known as: CARDIZEM     EScitalopram oxalate 5 MG Tab  Commonly known as: LEXAPRO     fluticasone propionate 50 mcg/actuation nasal spray  Commonly known as: FLONASE     HYDROcodone-acetaminophen  mg per  tablet  Commonly known as: NORCO     ipratropium 42 mcg (0.06 %) nasal spray  Commonly known as: ATROVENT     levETIRAcetam 500 MG Tab  Commonly known as: KEPPRA  Take 1 tablet (500 mg total) by mouth 2 (two) times daily.     memantine 10 MG Tab  Commonly known as: NAMENDA     tamsulosin 0.4 mg Cap  Commonly known as: FLOMAX     XARELTO 20 mg Tab  Generic drug: rivaroxaban               Where to Get Your Medications        These medications were sent to [facility] [place] - [address]  [address] [address]      Phone: [phone]   furosemide 20 MG tablet          Explained in detail to the patient about the discharge plan, medications, and follow-up visits. Pt understands and agrees with the treatment plan  [place] [facility] [place]charged Condition: stable  Diet-    Medications Per DC med rec  Activities as tolerated   Follow-up Information       Carl Barksdale MD. Schedule an appointment as soon as possible for a visit in 1 week(s).    Specialty: Pulmonary Disease  Why: Pulmonology follow up on lung mass biopsy  Contact information:  [address] [place] [address]  [address]  [facility] [address]  [phone]               [facility] Follow up.    Specialties: Home Health Services, Hospice and Palliative Medicine, Physical Therapy  Why: Home Health will contact you to resume home services  Contact information:  [address]  [address]  [facility] [id number] [phone]                           For further questions contact hospitalist office    Discharge time 33 minutes    For worsening symptoms, chest pain, shortness of breath, increased abdominal pain, high grade fever, stroke or stroke like symptoms, immediately go to the nearest Emergency Room or call 911 as soon as possible.      Matias Gleason M.D on 10/4/2024, 02:18 pm

## 2024-10-07 ENCOUNTER — OFFICE VISIT (OUTPATIENT)
Dept: HEMATOLOGY/ONCOLOGY | Facility: CLINIC | Age: 79
End: 2024-10-07
Payer: MEDICARE

## 2024-10-07 ENCOUNTER — PATIENT MESSAGE (OUTPATIENT)
Dept: ADMINISTRATIVE | Facility: OTHER | Age: 79
End: 2024-10-07
Payer: MEDICARE

## 2024-10-07 VITALS
HEIGHT: 63 IN | WEIGHT: 178.19 LBS | BODY MASS INDEX: 31.57 KG/M2 | RESPIRATION RATE: 18 BRPM | SYSTOLIC BLOOD PRESSURE: 107 MMHG | OXYGEN SATURATION: 91 % | TEMPERATURE: 98 F | HEART RATE: 92 BPM | DIASTOLIC BLOOD PRESSURE: 60 MMHG

## 2024-10-07 DIAGNOSIS — T45.1X5A CHEMOTHERAPY-INDUCED NAUSEA: ICD-10-CM

## 2024-10-07 DIAGNOSIS — C7A.1 SMALL CELL NEUROENDOCRINE CARCINOMA OF LUNG: Primary | ICD-10-CM

## 2024-10-07 DIAGNOSIS — R11.0 CHEMOTHERAPY-INDUCED NAUSEA: ICD-10-CM

## 2024-10-07 PROCEDURE — 99205 OFFICE O/P NEW HI 60 MIN: CPT | Mod: S$PBB,,, | Performed by: STUDENT IN AN ORGANIZED HEALTH CARE EDUCATION/TRAINING PROGRAM

## 2024-10-07 PROCEDURE — 99999 PR PBB SHADOW E&M-EST. PATIENT-LVL V: CPT | Mod: PBBFAC,,, | Performed by: STUDENT IN AN ORGANIZED HEALTH CARE EDUCATION/TRAINING PROGRAM

## 2024-10-07 PROCEDURE — 99215 OFFICE O/P EST HI 40 MIN: CPT | Mod: PBBFAC | Performed by: STUDENT IN AN ORGANIZED HEALTH CARE EDUCATION/TRAINING PROGRAM

## 2024-10-07 RX ORDER — OLANZAPINE 5 MG/1
TABLET ORAL
Qty: 4 TABLET | Refills: 3 | Status: SHIPPED | OUTPATIENT
Start: 2024-10-07

## 2024-10-07 RX ORDER — PROCHLORPERAZINE MALEATE 5 MG
5 TABLET ORAL EVERY 6 HOURS PRN
Qty: 20 TABLET | Refills: 5 | Status: SHIPPED | OUTPATIENT
Start: 2024-10-07

## 2024-10-07 RX ORDER — ONDANSETRON HYDROCHLORIDE 8 MG/1
8 TABLET, FILM COATED ORAL EVERY 8 HOURS PRN
Qty: 30 TABLET | Refills: 2 | Status: SHIPPED | OUTPATIENT
Start: 2024-10-07 | End: 2024-11-06

## 2024-10-07 RX ORDER — DEXAMETHASONE 4 MG/1
8 TABLET ORAL DAILY
Qty: 6 TABLET | Refills: 3 | Status: SHIPPED | OUTPATIENT
Start: 2024-10-07

## 2024-10-07 NOTE — PROGRESS NOTES
Subjective:       Patient ID: Gerald J Lejeune is a 79 y.o. male.    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 He has a 150+ pack year smoking history, quit 1 year prior to presentation, on 3-4L NC at baseline. He presented to [facility] in September '24 for new onset seizures. Workup was done which was unrevealing of etiology, but during that workup he was found to have large volume R pleural effusion, large mediastinal LAD, and R mainstem mucous plugging vs mass obstruction. He has had his R sided pleural effusion drained twice due to quick reaccumulation, cytology negative x2. He underwent EBUS on 10/2 where within the R mainstem bronchus a near complete occlusion due to fungating mass was noted. Biopsy was done with final pathology revealed grade 3 neuroendocrine carcinoma consistent with small cell lung cancer.       I discussed his diagnosis, staging, prognosis and referenced NCCN guidelines when discussing treatment therapy. I explained the common side effects of chemotherapy and immunotherapy in detail including fatigue, nausea, vomiting, constipation, diarrhea, and increased risk of infections. We discussed his median OS with and without treatment. I discussed the role of palliative care as a supplement to treatment to support him and his family with the symptoms of his cancer. They are agreeable to proceed.     Past Medical History:   Diagnosis Date    Adenopathy     Anesthesia complication     takes longer to wake    Anxiety     BPH (benign prostatic hyperplasia)     COPD (chronic obstructive pulmonary disease)     Dementia     Depression     Hyperlipidemia     Hypertension     Obesity     Oxygen dependent     4L/NC    PAD (peripheral artery disease)     Post-obstructive pneumonia due to foreign body aspiration     Levaquin     Seizures     Shortness of breath     Sleep apnea     CPAP    Walker as ambulation aid       Past Surgical History:   Procedure Laterality Date    BELOW KNEE AMPUTATION OF LOWER EXTREMITY Right     BRONCHOSCOPY, WITH TRANSBRONCHIAL BIOPSY Right 10/2/2024    Procedure: BRONCHOSCOPY, WITH TRANSBRONCHIAL BIOPSY;  Surgeon: Lee Suarez Jr., MD, [facility];  Location: [facility] BRONCHOSCOPY LAB;  Service: Pulmonary;  Laterality: Right;  RUL    ENDOBRONCHIAL ULTRASOUND N/A 10/2/2024    Procedure: ENDOBRONCHIAL ULTRASOUND (EBUS)  with FLURO;  Surgeon: Lee Suarez Jr., MD, [facility];  Location: [facility] BRONCHOSCOPY LAB;  Service: Pulmonary;  Laterality: N/A;    INSERTION, STENT, BARE METAL, ARTERY, PERIPHERAL  2019    THORACENTESIS  09/21/2024     Social History     Socioeconomic History    Marital status:    Tobacco Use    Smoking status: Former     Types: Cigarettes    Smokeless tobacco: Never   Substance and Sexual Activity    Alcohol use: Yes     Alcohol/week: 2.0 standard drinks of alcohol     Types: 2 Cans of beer per week    Drug use: Never     Social Drivers of Health     Financial Resource Strain: Low Risk  (10/1/2024)    Overall Financial Resource Strain (CARDIA)     Difficulty of Paying Living Expenses: Not hard at all   Food Insecurity: No Food Insecurity (10/1/2024)    Hunger Vital Sign     Worried About Running Out of Food in the Last Year: Never true     Ran Out of Food in the Last Year: Never true   Transportation Needs: No Transportation Needs (10/1/2024)    TRANSPORTATION NEEDS     Transportation : No   Stress: No Stress Concern Present (10/1/2024)    [employer] - Occupational Stress Questionnaire     Feeling of Stress : Only a little   Housing Stability: Low Risk  (10/1/2024)    Housing Stability Vital Sign     Unable to Pay for Housing in the Last Year: No     Homeless in the Last Year: No      No family history on file.   Review of patient's allergies indicates:  No Known  Allergies   Review of Systems   Constitutional:  Negative for activity change, appetite change, chills, fatigue and fever.   HENT:  Negative for sore throat.    Eyes:  Negative for visual disturbance.   Respiratory:  Negative for cough and shortness of breath.    Cardiovascular:  Negative for chest pain.   Gastrointestinal:  Negative for abdominal pain, constipation, diarrhea, nausea and vomiting.   Endocrine: Negative for polyuria.   Genitourinary:  Negative for dysuria.   Musculoskeletal:  Negative for back pain.   Integumentary:  Negative for rash.   Allergic/Immunologic: Negative for frequent infections.   Neurological:  Negative for weakness and headaches.   Hematological:  Negative for adenopathy. Does not bruise/bleed easily.         Objective:      There were no vitals filed for this visit.    Physical Exam  Constitutional:       General: He is not in acute distress.     Appearance: Normal appearance. He is ill-appearing.   HENT:      Head: Normocephalic and atraumatic.      Nose: Nose normal.      Mouth/Throat:      Mouth: Mucous membranes are moist.      Pharynx: Oropharynx is clear.   Eyes:      Extraocular Movements: Extraocular movements intact.      Conjunctiva/sclera: Conjunctivae normal.      Pupils: Pupils are equal, round, and reactive to light.   Cardiovascular:      Rate and Rhythm: Normal rate and regular rhythm.      Pulses: Normal pulses.      Heart sounds: Normal heart sounds. No murmur heard.  Pulmonary:      Effort: Pulmonary effort is normal. No respiratory distress.      Comments: 3L NC  Coarse minimal BS on R side  Abdominal:      General: There is no distension.      Palpations: Abdomen is soft.      Tenderness: There is no abdominal tenderness.   Musculoskeletal:         General: Deformity (R BKA) present. Normal range of motion.      Cervical back: Normal range of motion and neck supple.      Right lower leg: No edema.      Left lower leg: No edema.   Lymphadenopathy:      Cervical:  No cervical adenopathy.   Skin:     General: Skin is warm and dry.   Neurological:      General: No focal deficit present.      Mental Status: He is alert and oriented to person, place, and time.         LABS AND IMAGING REVIEWED IN EPIC    IMAGIN24 MRI Brain:  Impression:  1.  No acute intra findings or metastatic evidence.  2.  Chronic microangiopathic ischemia and atrophy.      PATHOLOGY:  10/2/24 EBUS:  FINAL DIAGNOSIS   1. LYMPH NODE 4R, EBUS-GUIDED BIOPSY:   METASTATIC SMALL CELL CARCINOMA (HIGH GRADE NEUROENDOCRINE CARCINOMA).   IMMUNOHISTOCHEMICAL STAINS PERFORMED WITH ADEQUATE CONTROLS:   KI-67, LOW MOLECULAR WEIGHT CYTOKERATIN, CD56, SYNAPTOPHYSIN, TTF-1 AND   CHROMOGRANIN:  INCONCLUSIVE DUE TO EXTENSIVE NECROSIS.      2. RIGHT LUNG, UPPER LOBE, BIOPSY:   HIGH GRADE NEUROENDOCRINE CARCINOMA (SMALL CELL CARCINOMA).   IMMUNOHISTOCHEMICAL STAINS PERFORMED WITH ADEQUATE CONTROLS:   CD56, LOW MOLECULAR WEIGHT CYTOKERATIN, SYNAPTOPHYSIN, CHROMOGRANIN AND TTF-1:   STRONGLY POSITIVE IN MALIGNANT CELLS.   KI-67:  SHOWS NEAR 100% NUCLEAR POSITIVITY IN MALIGNANT CELLS.     Assessment:   Extensive Stage R Lung SCLC - with recurrent pleural effusions and near obstructing R mainstem mass. Would be a candidate for palliative chemotherapy and would consider palliative radiation given the near occlusion of his mainstem if no immediate response.     I discussed his diagnosis, stage, and overall prognosis. We discussed the mOS with therapy and without and that the goals of treatment were palliative. He does wish to proceed.       Plan:   - PET/CT ordered today for complete staging  - PICC line placement needed 10/14 as I do not think he is a candidate for intubation/general anesthesia needed for mediport placement at this time  - Plan to proceed with C1D1-3 of Carbo/Etop/Atezolizumab on 10/15 - .   - Referral to palliative care, he has seen them once inpatient  - Chemo education this week  - MRI Brain done  inpatient in September '24  - RTC 10/14 for MD visit, labs same day - CBC, CMP, TSH    Call Daughter with all appts/information    I spent a total of 60 minutes on the day of the visit.This includes face to face time and non-face to face time preparing to see the patient (eg, review of tests), obtaining and/or reviewing separately obtained history, documenting clinical information in the electronic or other health record, independently interpreting results and communicating results to the patient/family/caregiver, or care coordinator.      Elizabeth Kennedy LeJeune, MD  Hematology/Oncology   [facility]      Professional Services   I, Yanelis Galdamez LPN, acted solely as a scribe for and in the presence of Dr. Elizabeth Kennedy LeJeune, who performed these services.

## 2024-10-09 ENCOUNTER — OFFICE VISIT (OUTPATIENT)
Dept: HEMATOLOGY/ONCOLOGY | Facility: CLINIC | Age: 79
End: 2024-10-09
Payer: MEDICARE

## 2024-10-09 ENCOUNTER — CLINICAL SUPPORT (OUTPATIENT)
Dept: HEMATOLOGY/ONCOLOGY | Facility: CLINIC | Age: 79
End: 2024-10-09
Payer: MEDICARE

## 2024-10-09 ENCOUNTER — HOSPITAL ENCOUNTER (OUTPATIENT)
Dept: RADIOLOGY | Facility: HOSPITAL | Age: 79
Discharge: HOME | End: 2024-10-09
Attending: STUDENT IN AN ORGANIZED HEALTH CARE EDUCATION/TRAINING PROGRAM
Payer: MEDICARE

## 2024-10-09 DIAGNOSIS — C7A.1 SMALL CELL NEUROENDOCRINE CARCINOMA OF LUNG: Primary | ICD-10-CM

## 2024-10-09 DIAGNOSIS — C7A.1 SMALL CELL NEUROENDOCRINE CARCINOMA OF LUNG: ICD-10-CM

## 2024-10-09 PROCEDURE — 99999 PR PBB SHADOW E&M-EST. PATIENT-LVL I: CPT | Mod: PBBFAC,,,

## 2024-10-09 PROCEDURE — A9552 F18 FDG: HCPCS | Performed by: STUDENT IN AN ORGANIZED HEALTH CARE EDUCATION/TRAINING PROGRAM

## 2024-10-09 PROCEDURE — 99211 OFF/OP EST MAY X REQ PHY/QHP: CPT | Mod: PBBFAC,25,27

## 2024-10-09 PROCEDURE — 99215 OFFICE O/P EST HI 40 MIN: CPT | Mod: S$PBB,,,

## 2024-10-09 PROCEDURE — 99212 OFFICE O/P EST SF 10 MIN: CPT | Mod: PBBFAC

## 2024-10-09 PROCEDURE — 99999 PR PBB SHADOW E&M-EST. PATIENT-LVL II: CPT | Mod: PBBFAC,,,

## 2024-10-09 PROCEDURE — 78815 PET IMAGE W/CT SKULL-THIGH: CPT | Mod: TC

## 2024-10-09 RX ORDER — FLUDEOXYGLUCOSE F18 500 MCI/ML
10 INJECTION INTRAVENOUS
Status: COMPLETED | OUTPATIENT
Start: 2024-10-09 | End: 2024-10-09

## 2024-10-09 RX ADMIN — FLUDEOXYGLUCOSE F-18 10 MILLICURIE: 500 INJECTION INTRAVENOUS at 01:10

## 2024-10-09 NOTE — NURSING
Oncology Navigation   Intake  Cancer Type: Thoracic  Initial Nurse Navigator Contact: 10/09/24     Treatment  Current Status: Staging work-up       Medical Oncologist: Lejeune  Consult Date: 10/07/24  Chemotherapy: Planned  Chemotherapy Regimen: atezolizumab CARBOplatin etoposide Q3W       Procedures: PET scan; Port / PICC  PET Scan Schedule Date: 10/09/24  Port / PICC Schedule Date: 10/14/24             Support Systems: Children; Spouse/significant other; Family members               Met with patient, daughter, granddaughter, and significant other today following education visit. Introduced self and role of navigation. Assessed for barriers to care. They have no concerns regarding transportation. Patient has a good support system. He denies anxiety/depression. Patient has no further questions or concerns today. They are aware of how to reach navigation should they need to.

## 2024-10-09 NOTE — PROGRESS NOTES
THERAPY EDUCATION: ATEZOLIZUMAB + CARBOPLATIN + ETOPOSIDE  Subjective:      Patient ID: Gerald J Lejeune is a 79 y.o. male.    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 He has a 150+ pack year smoking history, quit 1 year prior to presentation, on 3-4L NC at baseline. He presented to [facility] in September '24 for new onset seizures. Workup was done which was unrevealing of etiology, but during that workup he was found to have large volume R pleural effusion, large mediastinal LAD, and R mainstem mucous plugging vs mass obstruction. He has had his R sided pleural effusion drained twice due to quick reaccumulation, cytology negative x2. He underwent EBUS on 10/2 where within the R mainstem bronchus a near complete occlusion due to fungating mass was noted. Biopsy was done with final pathology revealed grade 3 neuroendocrine carcinoma consistent with small cell lung cancer.       I discussed his diagnosis, staging, prognosis and referenced NCCN guidelines when discussing treatment therapy. I explained the common side effects of chemotherapy and immunotherapy in detail including fatigue, nausea, vomiting, constipation, diarrhea, and increased risk of infections. We discussed his median OS with and without treatment. I discussed the role of palliative care as a supplement to treatment to support him and his family with the symptoms of his cancer. They are agreeable to proceed.     Interval History     10/9/24: Mr. Lejeune presents to the clinic for therapy education accompanied by family. Patient uses a walker as well as 4L NC. Patient states SOB on exertion. Denies fever, chills, N/V/D, constipation, recent infection, chest pain or unexplained bleeding or bruising.      Past Medical  History:   Diagnosis Date    Adenopathy     Anesthesia complication     takes longer to wake    Anxiety     BPH (benign prostatic hyperplasia)     COPD (chronic obstructive pulmonary disease)     Dementia     Depression     Hyperlipidemia     Hypertension     Obesity     Oxygen dependent     4L/NC    PAD (peripheral artery disease)     Post-obstructive pneumonia due to foreign body aspiration     Levaquin    Seizures     Shortness of breath     Sleep apnea     CPAP    Walker as ambulation aid       Past Surgical History:   Procedure Laterality Date    BELOW KNEE AMPUTATION OF LOWER EXTREMITY Right     BRONCHOSCOPY, WITH TRANSBRONCHIAL BIOPSY Right 10/2/2024    Procedure: BRONCHOSCOPY, WITH TRANSBRONCHIAL BIOPSY;  Surgeon: Lee Suarez Jr., MD, [facility];  Location: [facility] BRONCHOSCOPY LAB;  Service: Pulmonary;  Laterality: Right;  RUL    ENDOBRONCHIAL ULTRASOUND N/A 10/2/2024    Procedure: ENDOBRONCHIAL ULTRASOUND (EBUS)  with FLURO;  Surgeon: Lee Suarez Jr., MD, [facility];  Location: [facility] BRONCHOSCOPY LAB;  Service: Pulmonary;  Laterality: N/A;    INSERTION, STENT, BARE METAL, ARTERY, PERIPHERAL  2019    THORACENTESIS  09/21/2024     Social History     Socioeconomic History    Marital status:    Tobacco Use    Smoking status: Former     Types: Cigarettes    Smokeless tobacco: Never   Substance and Sexual Activity    Alcohol use: Yes     Alcohol/week: 2.0 standard drinks of alcohol     Types: 2 Cans of beer per week    Drug use: Never     Social Drivers of Health     Financial Resource Strain: Low Risk  (10/1/2024)    Overall Financial Resource Strain (CARDIA)     Difficulty of Paying Living Expenses: Not hard at all   Food Insecurity: No Food Insecurity (10/1/2024)    Hunger Vital Sign     Worried About Running Out of Food in the Last Year: Never true     Ran Out of Food in the Last Year: Never true   Transportation Needs: No Transportation Needs (10/1/2024)    TRANSPORTATION NEEDS     Transportation : No    Stress: No Stress Concern Present (10/1/2024)    [employer] - Occupational Stress Questionnaire     Feeling of Stress : Only a little   Housing Stability: Low Risk  (10/1/2024)    Housing Stability Vital Sign     Unable to Pay for Housing in the Last Year: No     Homeless in the Last Year: No      No family history on file.   Review of patient's allergies indicates:  No Known Allergies   Review of Systems   Constitutional:  Negative for activity change, appetite change, chills, fatigue and fever.   HENT:  Negative for sore throat.    Eyes:  Negative for visual disturbance.   Respiratory:  Negative for cough and shortness of breath.    Cardiovascular:  Negative for chest pain.   Gastrointestinal:  Negative for abdominal pain, constipation, diarrhea, nausea and vomiting.   Endocrine: Negative for polyuria.   Genitourinary:  Negative for dysuria.   Musculoskeletal:  Negative for back pain.   Integumentary:  Negative for rash.   Allergic/Immunologic: Negative for frequent infections.   Neurological:  Negative for weakness and headaches.   Hematological:  Negative for adenopathy. Does not bruise/bleed easily.       Objective:     There were no vitals filed for this visit.        LABS AND IMAGING REVIEWED IN EPIC    IMAGIN24 MRI Brain:  Impression:  1.  No acute intra findings or metastatic evidence.  2.  Chronic microangiopathic ischemia and atrophy.      PATHOLOGY:  10/2/24 EBUS:  FINAL DIAGNOSIS   1. LYMPH NODE 4R, EBUS-GUIDED BIOPSY:   METASTATIC SMALL CELL CARCINOMA (HIGH GRADE NEUROENDOCRINE CARCINOMA).   IMMUNOHISTOCHEMICAL STAINS PERFORMED WITH ADEQUATE CONTROLS:   KI-67, LOW MOLECULAR WEIGHT CYTOKERATIN, CD56, SYNAPTOPHYSIN, TTF-1 AND   CHROMOGRANIN:  INCONCLUSIVE DUE TO EXTENSIVE NECROSIS.      2. RIGHT LUNG, UPPER LOBE, BIOPSY:   HIGH GRADE NEUROENDOCRINE CARCINOMA (SMALL CELL CARCINOMA).   IMMUNOHISTOCHEMICAL STAINS PERFORMED WITH ADEQUATE CONTROLS:   CD56, LOW  MOLECULAR WEIGHT CYTOKERATIN, SYNAPTOPHYSIN, CHROMOGRANIN AND TTF-1:   STRONGLY POSITIVE IN MALIGNANT CELLS.   KI-67:  SHOWS NEAR 100% NUCLEAR POSITIVITY IN MALIGNANT CELLS.     Assessment:   Extensive Stage R Lung SCLC .   Plan:   -Therapy education completed on 10/9/24.  -PET CT scheduled for 10/9/24.  - PICC line placement scheduled for 10/14/24.  - Plan to proceed with C1D1-3 of Carbo/Etop/Atezolizumab on 10/15 - 17. Patient understood that he would receive premedications given 30 minutes prior to each treatment to help prevent nausea.   -Antiemetic regimen schedule given and calendar made for guidance    Dexamethasone- Take 2 tablets by mouth daily on Days 2-4 of first 4 cycles of each treatment.    Olanzapine- Take 1 tablet by mouth daily in the evening on Days 1-4 of first 4 cycles of each treatment    -Compazine PRN prescribed for at home with instructions to be taken by mouth every 6 hours as needed for nausea.   -OTC Imodium AD recommended for diarrhea (4-5 BMs a day). Take 2 tablets after the first loose bowel movement, and 1 tablet after each loose bowel movement after the first dose has been taken. No more than 4 tablets should be taken in any 24-hour period. If not working, Lomotil can be prescribed as 2nd choice.    -Mouth sore prevention with 1-quart warm water with 1 tsp of baking soda and salt and alcohol-free mouthwash.   -Emphasized adequate hand-hygiene and limited contact with people who are sick.   -Monitor and notify any bleeding in urine, stool, or sputum.  As well as unusual bleeding or bruising and stomach pain.   - Emphasized hydration with 4 16 oz bottles of water a day.    -Importance of moisturizing with fragrance free lotion to prevent skin rash.  -Call clinic if fever >100.4, shakes or chills, shortness of breath, chest pain, uncontrolled vomiting or diarrhea, pain and tingling in the chest or arm, or just not feeling well.    - RTC 10/14 for MD visit, labs same day - CBC, CMP,  TSH.    DISCUSSION:    1.  A total of 60 minutes were spent in counseling today, in which 100% were face-to-face.  At today's chemotherapy teaching session, we discussed the patient's cancer diagnosis as well as planned therapy regimen, protocol, side effects and toxicities.  A handout of each therapeutic agent in the regimen was provided and reviewed in detail.    2.  The following side effects for chemotherapy were discussed but not limited to:    Atezolizumab Side Effects:  Allergic Reactions  Bone Pain  Breathing Problems  Chest Pain  Chills  Cough  Diarrhea  Dizziness  Dry Mouth  Dry Skin  Fever  Flushing  Headache  Injury  Itching  Joint Pain  Muscle Pain  Nausea  Pain  Rash  Swelling  Weakness  Weight Loss    Carboplatin Side Effects:  Allergic Reactions  Breathing Problems  Bruising  Chest Pain  Chills  Cough  Fever  Hair Loss  High Blood Pressure  Infection  Itching  Low Blood Counts  Metallic Taste  Nausea  Nosebleeds  Numbness  Pain  Rash  Swelling  Tingling  Vomiting    Etoposide (Injection) Side Effects:  Allergic Reactions  Anemia  Breathing Problems  Bruising  Chills  Cough  Diarrhea  Feeling Faint  Fever  Hair Loss  Infection  Itching  Low Blood Counts  Mouth Sores  Nausea  Pain  Rash  Swelling  Vomiting                a.  Discussed the risk of infection while on chemotherapy related to pancytopenia, specifically a decrease in their white blood cell count.  Instructed to contact our office for temperature >100.5 F, chills, sudden onset cough or shortness of breath, symptoms of a urinary tract infection.                b.  Discussed the risk of anemia. Instructed to contact our office for dizziness, heart palpitations, or extreme or sudden changes in weakness.                c.  Discussed the risk of thrombocytopenia, which increases the risk of bruising or bleeding.  Instructed the patient to contact our office for spontaneous signs of bleeding, including nose bleeds, bleeding from the gums or mouth,  blood in sputum, urine or stool and unusual or excessive bruising or rash.                d.  Discussed GI side effects including weight changes, changes in appetite, altered sense of taste, stomatitis, nausea, vomiting, diarrhea, constipation, and heartburn.                e.  Discussed  side effects including painful urination, changes in the amount of urination, possible urine color changes.  Discussed fertility issues and to prevent  pregnancy if of child bearing age.                f.  Discussed neurological side effects including the risk of peripheral neuropathy, either temporary or permanent.                g.  Discussed the potential for skin, hair, and nail changes.       3.  Instructed to contact our office for discussion of medication changes, the addition of vitamin and/or herbal supplementation as they may interact with some chemotherapy agents.    4.  Discussed dietary modifications and the need to maintain adequate caloric intake and proper oral hydration.  Recommended 64 ounces of fluid per day.    5.  Discussed anti-emetic protocol and bowel regimen protocol.      6. The following side effects for immunotherapy were discussed but not limited to:    ·       We reviewed that side effects and toxicities typically occur after 4-10 weeks of treatment, and include but are not limited to:    ·       Discussed the risk of immune-mediated pneumonitis, including interstitial lung disease.  Instructed to contact our office for new or worsening chest pain, severe cough or shortness of breath.    ·       Discussed the risk of immune-mediated colitis and diarrhea. Instructed to contact our office for diarrhea she cannot control or that results in =4 bowel movements per day.    ·       Discussed the risk of immune mediated hepatitis. Instructed to contact office for right upper quadrant pain, yellowing of skin or eyes, concentrated yellow urine, severe nausea and vomiting, or severe abdominal pain.    ·        Discussed the risk of immune-mediated nephritis. Instructed patient on possible symptoms of decreased urine output, blood in the urine or very dark urine, overall swelling, or very high blood pressure.    ·       Discussed the risk of immune-mediated cardiomyopathy, including symptoms of edema, including periorbital edema, generalized edema, peripheral edema, and pulmonary edema.    ·       Discussed the risk of immune-mediated adrenal insufficiency or hypophysitis, including symptoms of headache, visual disturbances, fatigue, weight loss, and hypotension.    ·       Discussed immune-mediated thyroiditis, which can result in either hypothyroid or hyperthyroid. These will be managed according to symptoms, without a dose adjustment needed.    ·       Discussed the risk of rashes including an immune-mediated rash, James-Aman syndrome (SJS), and toxic epidermal necrolysis (TENS). Also possible are vitiligo/skin hypopigmentation.  Instructed to contact our office for any new onset moderate to severe rash.    ·       Discussed the risk of immune-mediated encephalitis, including symptoms of altered mental status, headache, fever, confusion, agitation or hallucinations, seizures, loss of sensation or paralysis, muscle weakness, double vision, problems with speech or hearing, or loss on consciousness.    ·       Discussed musculoskeletal side effects of muscle and joint pain.    ·       Discussed respiratory side effects of cough and dyspnea, increased risk of upper respiratory infections.    ·       Discussed GI side effects of nausea and vomiting, and to a lesser extent abdominal pain, decreased appetite, wt loss, and constipation.    ·       Discussed possible side effects of edema, including periorbital edema, generalized edema, peripheral edema, and pulmonary edema.    ·       Discussed general side effects of fatigue and fever.    ·       Discussed possible electrolyte abnormalities.    ·       Discussed possible  hematologic toxicities.    ·       Instructed to contact our office for discussion of medication changes, vaccination, the addition of vitamin and/or herbal supplementation as they may interact with some immunotherapy agents.    ·       Discussed dietary modifications and the need to maintain adequate caloric intake and proper oral hydration.  Recommended at least 64-80 oz of fluid per day.    ·       Discussed anti-emetic protocol and bowel regimen protocol.    ·       Office contact information given including after hours number.  Discussed there is an oncologist on call 24/7, 365 days, including weekends.  Provided business card with direct telephone number  to use as necessary during business hours and after hours    ·       In summary, the patient is in agreement with the treatment plan.  Questions appeared to be answered to their satisfaction. Consented the patient to the treatment plan and the patient was educated on the planned duration of the treatment and schedule of the treatment administration.  Copy scanned into the chart.    Office contact information given including after hours number.  Discussed there is an oncologist on call 24/7, 365 days including weekends.  Provided primary nurse's information .    All questions answered to the satisfaction of the patient and family.     Follow up appointments given to patient.     JUAN F MCRAE-ALBERTO  PATIENT EDUCATOR  [facility]

## 2024-10-10 DIAGNOSIS — C7A.1 SMALL CELL NEUROENDOCRINE CARCINOMA OF LUNG: Primary | ICD-10-CM

## 2024-10-10 DIAGNOSIS — Z79.899 LONG-TERM CURRENT USE OF HIGH RISK MEDICATION OTHER THAN ANTICOAGULANT: ICD-10-CM

## 2024-10-10 NOTE — H&P (VIEW-ONLY)
Subjective:       Patient ID: Gerald J Lejeune is a 79 y.o. male.    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 He has a 150+ pack year smoking history, quit 1 year prior to presentation, on 3-4L NC at baseline. He presented to [facility] in September '24 for new onset seizures. Workup was done which was unrevealing of etiology, but during that workup he was found to have large volume R pleural effusion, large mediastinal LAD, and R mainstem mucous plugging vs mass obstruction. He has had his R sided pleural effusion drained twice due to quick reaccumulation, cytology negative x2. He underwent EBUS on 10/2 where within the R mainstem bronchus a near complete occlusion due to fungating mass was noted. Biopsy was done with final pathology revealed grade 3 neuroendocrine carcinoma consistent with small cell lung cancer.     I discussed his diagnosis, staging, prognosis and referenced NCCN guidelines when discussing treatment therapy. I explained the common side effects of chemotherapy and immunotherapy in detail including fatigue, nausea, vomiting, constipation, diarrhea, and increased risk of infections. We discussed his median OS with and without treatment. I discussed the role of palliative care as a supplement to treatment to support him and his family with the symptoms of his cancer. They are agreeable to proceed.     October '24 PET with evidence of extensive disease to R lobe with pleural effusion and hypermetabolic LAD. No evidence of disease outside of the chest. Plan to proceed with 1st line carboplatin AUC 4, Etoposide 80mg/m2, and Atezolizumab 1200mg Q3w x 4 cycles in the palliative setting then repeat PET to assess response. Chemotherapy dose reduced due to patients age, comorbidities, and  performance status.     Interval History:   Patient presents to clinic today for MD follow up appointment and labs for toxicity check and scan results prior to C1 Carbo/Etop/Atezolizumab on 10/15.  He states he is doing okay today.  Reports intermittent episodes of short term memory loss and confusion.  Cough with pink tinged mucous since last night.  Complains of increased dyspnea.  Discussed labs and scan in detail with patient and family.  Otherwise, he has no further complaints or concerns.     Past Medical History:   Diagnosis Date    Adenopathy     Anesthesia complication     takes longer to wake    Anxiety     BPH (benign prostatic hyperplasia)     COPD (chronic obstructive pulmonary disease)     Dementia     Depression     Hyperlipidemia     Hypertension     Obesity     Oxygen dependent     4L/NC    PAD (peripheral artery disease)     Post-obstructive pneumonia due to foreign body aspiration     Levaquin    Seizures     Shortness of breath     Sleep apnea     CPAP    Walker as ambulation aid       Past Surgical History:   Procedure Laterality Date    BELOW KNEE AMPUTATION OF LOWER EXTREMITY Right     BRONCHOSCOPY, WITH TRANSBRONCHIAL BIOPSY Right 10/2/2024    Procedure: BRONCHOSCOPY, WITH TRANSBRONCHIAL BIOPSY;  Surgeon: Lee Suarez Jr., MD, [facility];  Location: [facility] BRONCHOSCOPY LAB;  Service: Pulmonary;  Laterality: Right;  RUL    ENDOBRONCHIAL ULTRASOUND N/A 10/2/2024    Procedure: ENDOBRONCHIAL ULTRASOUND (EBUS)  with FLURO;  Surgeon: Lee Suarez Jr., MD, [facility];  Location: [facility] BRONCHOSCOPY LAB;  Service: Pulmonary;  Laterality: N/A;    INSERTION, STENT, BARE METAL, ARTERY, PERIPHERAL  2019    THORACENTESIS  09/21/2024     Social History     Socioeconomic History    Marital status:    Tobacco Use    Smoking status: Former     Types: Cigarettes    Smokeless tobacco: Never   Substance and Sexual Activity    Alcohol use: Yes     Alcohol/week: 2.0 standard drinks of  alcohol     Types: 2 Cans of beer per week    Drug use: Never     Social Drivers of Health     Financial Resource Strain: Low Risk  (10/1/2024)    Overall Financial Resource Strain (CARDIA)     Difficulty of Paying Living Expenses: Not hard at all   Food Insecurity: No Food Insecurity (10/1/2024)    Hunger Vital Sign     Worried About Running Out of Food in the Last Year: Never true     Ran Out of Food in the Last Year: Never true   Transportation Needs: No Transportation Needs (10/1/2024)    TRANSPORTATION NEEDS     Transportation : No   Stress: No Stress Concern Present (10/1/2024)    [employer] [employer] - Occupational Stress Questionnaire     Feeling of Stress : Only a little   Housing Stability: Low Risk  (10/1/2024)    Housing Stability Vital Sign     Unable to Pay for Housing in the Last Year: No     Homeless in the Last Year: No      No family history on file.   Review of patient's allergies indicates:  No Known Allergies   Review of Systems   Constitutional:  Negative for activity change, appetite change, chills, fatigue, fever and unexpected weight change.   HENT:  Negative for mouth sores and sore throat.    Eyes:  Negative for visual disturbance.   Respiratory:  Negative for cough and shortness of breath.    Cardiovascular:  Negative for chest pain.   Gastrointestinal:  Negative for abdominal pain, constipation, diarrhea, nausea and vomiting.   Endocrine: Negative for polyuria.   Genitourinary:  Negative for dysuria and frequency.   Musculoskeletal:  Negative for back pain.   Integumentary:  Negative for rash.   Allergic/Immunologic: Negative for frequent infections.   Neurological:  Negative for weakness and headaches.   Hematological:  Negative for adenopathy. Does not bruise/bleed easily.   Psychiatric/Behavioral:  The patient is nervous/anxious.          Objective:      Vitals:    10/14/24 1426   BP: 127/67   Pulse: 76   Resp: 18   Temp: 97.6 °F (36.4 °C)       Physical  Exam  Constitutional:       General: He is not in acute distress.     Appearance: Normal appearance. He is ill-appearing.   HENT:      Head: Normocephalic and atraumatic.      Nose: Nose normal.      Mouth/Throat:      Mouth: Mucous membranes are moist.      Pharynx: Oropharynx is clear.   Eyes:      Extraocular Movements: Extraocular movements intact.      Conjunctiva/sclera: Conjunctivae normal.      Pupils: Pupils are equal, round, and reactive to light.   Cardiovascular:      Rate and Rhythm: Normal rate and regular rhythm.      Pulses: Normal pulses.      Heart sounds: Normal heart sounds. No murmur heard.  Pulmonary:      Effort: Pulmonary effort is normal. No respiratory distress.      Comments: 3L NC  Coarse minimal BS on R side  Abdominal:      General: There is no distension.      Palpations: Abdomen is soft.      Tenderness: There is no abdominal tenderness.   Musculoskeletal:         General: Deformity (R BKA) present. Normal range of motion.      Cervical back: Normal range of motion and neck supple.      Right lower leg: No edema.      Left lower leg: No edema.   Lymphadenopathy:      Cervical: No cervical adenopathy.   Skin:     General: Skin is warm and dry.   Neurological:      General: No focal deficit present.      Mental Status: He is alert and oriented to person, place, and time.       LABS AND IMAGING REVIEWED IN EPIC    IMAGIN24 MRI Brain:  Impression:  1.  No acute intra findings or metastatic evidence.  2.  Chronic microangiopathic ischemia and atrophy.    24 CT Chest:  IMPRESSION  1. Right near obstructive mucous plugging versus endobronchial lesion with almost complete consolidative collapse of the right lung.  Underlying malignancy is of concern given associated mediastinal adenopathy.  Superimposed infectious process not excluded.  2. Incidental nonobstructing right nephrolithiasis.  3. Additional chronic secondary findings discussed above.    10/9/24  PET/CT:  Impression:  Large conglomerate right hilar/perihilar mass with moderate FDG avidity.  There are postobstructive changes.  Small right pleural effusion with FDG avid pleural nodule dependently at the right hemithorax.  Hypermetabolic right hilar, mediastinal and right supraclavicular lymph nodes.      PATHOLOGY:  10/2/24 EBUS:  FINAL DIAGNOSIS   1. LYMPH NODE 4R, EBUS-GUIDED BIOPSY:   METASTATIC SMALL CELL CARCINOMA (HIGH GRADE NEUROENDOCRINE CARCINOMA).   IMMUNOHISTOCHEMICAL STAINS PERFORMED WITH ADEQUATE CONTROLS:   KI-67, LOW MOLECULAR WEIGHT CYTOKERATIN, CD56, SYNAPTOPHYSIN, TTF-1 AND   CHROMOGRANIN:  INCONCLUSIVE DUE TO EXTENSIVE NECROSIS.      2. RIGHT LUNG, UPPER LOBE, BIOPSY:   HIGH GRADE NEUROENDOCRINE CARCINOMA (SMALL CELL CARCINOMA).   IMMUNOHISTOCHEMICAL STAINS PERFORMED WITH ADEQUATE CONTROLS:   CD56, LOW MOLECULAR WEIGHT CYTOKERATIN, SYNAPTOPHYSIN, CHROMOGRANIN AND TTF-1:   STRONGLY POSITIVE IN MALIGNANT CELLS.   KI-67:  SHOWS NEAR 100% NUCLEAR POSITIVITY IN MALIGNANT CELLS.     Assessment:   Extensive Stage R Lung SCLC - with recurrent pleural effusions and near obstructing R mainstem mass. Would be a candidate for palliative chemotherapy and would consider palliative radiation given the near occlusion of his mainstem if no immediate response.     I discussed his diagnosis, stage, and overall prognosis. We discussed the goals with therapy and without and that the goals of treatment were palliative. He does wish to proceed.     Plan:   - Toxicity reviewed; okay to proceed with C1D1-3 of Carbo/Etop/Atezolizumab on 10/15 - 17. Dose reduce Carboplatin to AUC 4 and Etoposide to 80mg/m2 due to patient tolerance concerns.   - US Thoracentesis ASAP due to progressive reaccumulation  - RTC on 10/23 for NP visit, labs same day - CBC, CMP, TSH    Call Daughter with all appts/information    I spent a total of 40 minutes on the day of the visit.This includes face to face time and non-face to face time  preparing to see the patient (eg, review of tests), obtaining and/or reviewing separately obtained history, documenting clinical information in the electronic or other health record, independently interpreting results and communicating results to the patient/family/caregiver, or care coordinator.      Elizabeth Kennedy LeJeune, MD  Hematology/Oncology   [facility]      Professional Services   I, Yanelis Galdamez LPN, acted solely as a scribe for and in the presence of Dr. Elizabeth Kennedy LeJeune, who performed these services.

## 2024-10-10 NOTE — PROGRESS NOTES
Subjective:       Patient ID: Gerald J Lejeune is a 79 y.o. male.    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 He has a 150+ pack year smoking history, quit 1 year prior to presentation, on 3-4L NC at baseline. He presented to [facility] in September '24 for new onset seizures. Workup was done which was unrevealing of etiology, but during that workup he was found to have large volume R pleural effusion, large mediastinal LAD, and R mainstem mucous plugging vs mass obstruction. He has had his R sided pleural effusion drained twice due to quick reaccumulation, cytology negative x2. He underwent EBUS on 10/2 where within the R mainstem bronchus a near complete occlusion due to fungating mass was noted. Biopsy was done with final pathology revealed grade 3 neuroendocrine carcinoma consistent with small cell lung cancer.     I discussed his diagnosis, staging, prognosis and referenced NCCN guidelines when discussing treatment therapy. I explained the common side effects of chemotherapy and immunotherapy in detail including fatigue, nausea, vomiting, constipation, diarrhea, and increased risk of infections. We discussed his median OS with and without treatment. I discussed the role of palliative care as a supplement to treatment to support him and his family with the symptoms of his cancer. They are agreeable to proceed.     October '24 PET with evidence of extensive disease to R lobe with pleural effusion and hypermetabolic LAD. No evidence of disease outside of the chest. Plan to proceed with 1st line carboplatin AUC 4, Etoposide 80mg/m2, and Atezolizumab 1200mg Q3w x 4 cycles in the palliative setting then repeat PET to assess response. Chemotherapy dose reduced due to patients age, comorbidities, and  performance status.     Interval History:   Patient presents to clinic today for MD follow up appointment and labs for toxicity check and scan results prior to C1 Carbo/Etop/Atezolizumab on 10/15.  He states he is doing okay today.  Reports intermittent episodes of short term memory loss and confusion.  Cough with pink tinged mucous since last night.  Complains of increased dyspnea.  Discussed labs and scan in detail with patient and family.  Otherwise, he has no further complaints or concerns.     Past Medical History:   Diagnosis Date    Adenopathy     Anesthesia complication     takes longer to wake    Anxiety     BPH (benign prostatic hyperplasia)     COPD (chronic obstructive pulmonary disease)     Dementia     Depression     Hyperlipidemia     Hypertension     Obesity     Oxygen dependent     4L/NC    PAD (peripheral artery disease)     Post-obstructive pneumonia due to foreign body aspiration     Levaquin    Seizures     Shortness of breath     Sleep apnea     CPAP    Walker as ambulation aid       Past Surgical History:   Procedure Laterality Date    BELOW KNEE AMPUTATION OF LOWER EXTREMITY Right     BRONCHOSCOPY, WITH TRANSBRONCHIAL BIOPSY Right 10/2/2024    Procedure: BRONCHOSCOPY, WITH TRANSBRONCHIAL BIOPSY;  Surgeon: Lee Suarez Jr., MD, [facility];  Location: [facility] BRONCHOSCOPY LAB;  Service: Pulmonary;  Laterality: Right;  RUL    ENDOBRONCHIAL ULTRASOUND N/A 10/2/2024    Procedure: ENDOBRONCHIAL ULTRASOUND (EBUS)  with FLURO;  Surgeon: Lee Suarez Jr., MD, [facility];  Location: [facility] BRONCHOSCOPY LAB;  Service: Pulmonary;  Laterality: N/A;    INSERTION, STENT, BARE METAL, ARTERY, PERIPHERAL  2019    THORACENTESIS  09/21/2024     Social History     Socioeconomic History    Marital status:    Tobacco Use    Smoking status: Former     Types: Cigarettes    Smokeless tobacco: Never   Substance and Sexual Activity    Alcohol use: Yes     Alcohol/week: 2.0 standard drinks of  alcohol     Types: 2 Cans of beer per week    Drug use: Never     Social Drivers of Health     Financial Resource Strain: Low Risk  (10/1/2024)    Overall Financial Resource Strain (CARDIA)     Difficulty of Paying Living Expenses: Not hard at all   Food Insecurity: No Food Insecurity (10/1/2024)    Hunger Vital Sign     Worried About Running Out of Food in the Last Year: Never true     Ran Out of Food in the Last Year: Never true   Transportation Needs: No Transportation Needs (10/1/2024)    TRANSPORTATION NEEDS     Transportation : No   Stress: No Stress Concern Present (10/1/2024)    [employer] [place] - Occupational Stress Questionnaire     Feeling of Stress : Only a little   Housing Stability: Low Risk  (10/1/2024)    Housing Stability Vital Sign     Unable to Pay for Housing in the Last Year: No     Homeless in the Last Year: No      No family history on file.   Review of patient's allergies indicates:  No Known Allergies   Review of Systems   Constitutional:  Negative for activity change, appetite change, chills, fatigue, fever and unexpected weight change.   HENT:  Negative for mouth sores and sore throat.    Eyes:  Negative for visual disturbance.   Respiratory:  Negative for cough and shortness of breath.    Cardiovascular:  Negative for chest pain.   Gastrointestinal:  Negative for abdominal pain, constipation, diarrhea, nausea and vomiting.   Endocrine: Negative for polyuria.   Genitourinary:  Negative for dysuria and frequency.   Musculoskeletal:  Negative for back pain.   Integumentary:  Negative for rash.   Allergic/Immunologic: Negative for frequent infections.   Neurological:  Negative for weakness and headaches.   Hematological:  Negative for adenopathy. Does not bruise/bleed easily.   Psychiatric/Behavioral:  The patient is nervous/anxious.          Objective:      Vitals:    10/14/24 1426   BP: 127/67   Pulse: 76   Resp: 18   Temp: 97.6 °F (36.4 °C)       Physical  Exam  Constitutional:       General: He is not in acute distress.     Appearance: Normal appearance. He is ill-appearing.   HENT:      Head: Normocephalic and atraumatic.      Nose: Nose normal.      Mouth/Throat:      Mouth: Mucous membranes are moist.      Pharynx: Oropharynx is clear.   Eyes:      Extraocular Movements: Extraocular movements intact.      Conjunctiva/sclera: Conjunctivae normal.      Pupils: Pupils are equal, round, and reactive to light.   Cardiovascular:      Rate and Rhythm: Normal rate and regular rhythm.      Pulses: Normal pulses.      Heart sounds: Normal heart sounds. No murmur heard.  Pulmonary:      Effort: Pulmonary effort is normal. No respiratory distress.      Comments: 3L NC  Coarse minimal BS on R side  Abdominal:      General: There is no distension.      Palpations: Abdomen is soft.      Tenderness: There is no abdominal tenderness.   Musculoskeletal:         General: Deformity (R BKA) present. Normal range of motion.      Cervical back: Normal range of motion and neck supple.      Right lower leg: No edema.      Left lower leg: No edema.   Lymphadenopathy:      Cervical: No cervical adenopathy.   Skin:     General: Skin is warm and dry.   Neurological:      General: No focal deficit present.      Mental Status: He is alert and oriented to person, place, and time.       LABS AND IMAGING REVIEWED IN EPIC    IMAGIN24 MRI Brain:  Impression:  1.  No acute intra findings or metastatic evidence.  2.  Chronic microangiopathic ischemia and atrophy.    24 CT Chest:  IMPRESSION  1. Right near obstructive mucous plugging versus endobronchial lesion with almost complete consolidative collapse of the right lung.  Underlying malignancy is of concern given associated mediastinal adenopathy.  Superimposed infectious process not excluded.  2. Incidental nonobstructing right nephrolithiasis.  3. Additional chronic secondary findings discussed above.    10/9/24  PET/CT:  Impression:  Large conglomerate right hilar/perihilar mass with moderate FDG avidity.  There are postobstructive changes.  Small right pleural effusion with FDG avid pleural nodule dependently at the right hemithorax.  Hypermetabolic right hilar, mediastinal and right supraclavicular lymph nodes.      PATHOLOGY:  10/2/24 EBUS:  FINAL DIAGNOSIS   1. LYMPH NODE 4R, EBUS-GUIDED BIOPSY:   METASTATIC SMALL CELL CARCINOMA (HIGH GRADE NEUROENDOCRINE CARCINOMA).   IMMUNOHISTOCHEMICAL STAINS PERFORMED WITH ADEQUATE CONTROLS:   KI-67, LOW MOLECULAR WEIGHT CYTOKERATIN, CD56, SYNAPTOPHYSIN, TTF-1 AND   CHROMOGRANIN:  INCONCLUSIVE DUE TO EXTENSIVE NECROSIS.      2. RIGHT LUNG, UPPER LOBE, BIOPSY:   HIGH GRADE NEUROENDOCRINE CARCINOMA (SMALL CELL CARCINOMA).   IMMUNOHISTOCHEMICAL STAINS PERFORMED WITH ADEQUATE CONTROLS:   CD56, LOW MOLECULAR WEIGHT CYTOKERATIN, SYNAPTOPHYSIN, CHROMOGRANIN AND TTF-1:   STRONGLY POSITIVE IN MALIGNANT CELLS.   KI-67:  SHOWS NEAR 100% NUCLEAR POSITIVITY IN MALIGNANT CELLS.     Assessment:   Extensive Stage R Lung SCLC - with recurrent pleural effusions and near obstructing R mainstem mass. Would be a candidate for palliative chemotherapy and would consider palliative radiation given the near occlusion of his mainstem if no immediate response.     I discussed his diagnosis, stage, and overall prognosis. We discussed the goals with therapy and without and that the goals of treatment were palliative. He does wish to proceed.     Plan:   - Toxicity reviewed; okay to proceed with C1D1-3 of Carbo/Etop/Atezolizumab on 10/15 - 17. Dose reduce Carboplatin to AUC 4 and Etoposide to 80mg/m2 due to patient tolerance concerns.   - US Thoracentesis ASAP due to progressive reaccumulation  - RTC on 10/23 for NP visit, labs same day - CBC, CMP, TSH    Call Daughter with all appts/information    I spent a total of 40 minutes on the day of the visit.This includes face to face time and non-face to face time  preparing to see the patient (eg, review of tests), obtaining and/or reviewing separately obtained history, documenting clinical information in the electronic or other health record, independently interpreting results and communicating results to the patient/family/caregiver, or care coordinator.      Elizabeth Kennedy LeJeune, MD  Hematology/Oncology   Cancer Center [facility]      Professional Services   I, Yanelis Galdamez LPN, acted solely as a scribe for and in the presence of Dr. Elizabeth Kennedy LeJeune, who performed these services.

## 2024-10-11 NOTE — PROGRESS NOTES
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 Regular diet as tolerated    Nutrition Assessment    10/9/24: This is a 79 y.o.male with a medical diagnosis of lung CA. He reports good appetite and po intake. No c/o n/v/c/d. Wt has been stable.     Nutrition Factors Affecting Intake  none identified    PMHx: COPD, HLD, HTN, PAD, R BKA    Allergies: Patient has no known allergies.    Current Medications:    Current Outpatient Medications:     clopidogreL (PLAVIX) 75 mg tablet, Take 75 mg by mouth once daily., Disp: , Rfl:     dexAMETHasone (DECADRON) 4 MG Tab, Take 2 tablets (8 mg total) by mouth once daily. on days 2-4 of each chemotherapy cycle, Disp: 6 tablet, Rfl: 3    diltiaZEM (CARDIZEM) 120 MG tablet, Take 1 tablet by mouth once daily., Disp: , Rfl:     EScitalopram oxalate (LEXAPRO) 5 MG Tab, Take 5 mg by mouth once daily., Disp: , Rfl:     fluticasone propionate (FLONASE) 50 mcg/actuation nasal spray, 1 spray by Each Nostril route once daily., Disp: , Rfl:     furosemide (LASIX) 20 MG tablet, Take 1 tablet (20 mg total) by mouth once daily., Disp: 30 tablet, Rfl: 0    HYDROcodone-acetaminophen (NORCO)  mg per tablet, Take 1 tablet by mouth every 6 (six) hours as needed for Pain., Disp: , Rfl:     ipratropium (ATROVENT) 42 mcg (0.06 %) nasal spray, 2 sprays by Each Nostril route 3 (three) times daily., Disp: , Rfl:     levETIRAcetam (KEPPRA) 500 MG Tab, Take 1 tablet (500 mg total) by mouth 2 (two) times daily., Disp: 180 tablet, Rfl: 0    memantine (NAMENDA) 10 MG Tab, Take 10 mg by mouth 2 (two) times daily., Disp: , Rfl:     OLANZapine (ZYPREXA) 5 MG tablet, Take 1 tablet by mouth nightly on days 1-4 of  "each chemotherapy cycle., Disp: 4 tablet, Rfl: 3    ondansetron (ZOFRAN) 8 MG tablet, Take 1 tablet (8 mg total) by mouth every 8 (eight) hours as needed for Nausea., Disp: 30 tablet, Rfl: 2    prochlorperazine (COMPAZINE) 5 MG tablet, Take 1 tablet (5 mg total) by mouth every 6 (six) hours as needed for Nausea., Disp: 20 tablet, Rfl: 5    XARELTO 20 mg Tab, Take 20 mg by mouth once daily., Disp: , Rfl:     Labs: no new    Anthropometrics    Height:   Ht Readings from Last 1 Encounters:   10/07/24 5' 3" (1.6 m)      Weight:   Wt Readings from Last 1 Encounters:   10/07/24 80.8 kg (178 lb 3.2 oz)        Usual Body Weight: 80.8 kg (178 lb)  % Weight Change: 0    BMI: 31.5 (obese I)    Ideal Weight: 56.3 kg (124 lb)  % Ideal Weight: 144%      Nutrition Diagnosis    PES: Food and nutrition related knowledge deficit related to chronic illness as evidenced by lack of prior exposure to oncology nutrition. (resolved)     Nutrition Risk  low    Nutrition Intervention    Interventions(treatment strategy):  general/healthful diet and purpose of nutrition education    Education Provided: food safety guidelines and general healthy diet  Teaching Method: explanation and printed materials  Comprehension: verbalizes understanding  Barriers to Learning: none evident  Expected Compliance: good  Comments: All questions were answered and dietitian's contact information was provided.      Nutrition Monitoring and Evaluation    Ongoing monitoring not warranted at this time. Please consult RD prn.        Tammy Martin, MS, RD, , LDN                                                                                                                                                                                                                                                                                  "

## 2024-10-14 ENCOUNTER — OFFICE VISIT (OUTPATIENT)
Dept: HEMATOLOGY/ONCOLOGY | Facility: CLINIC | Age: 79
End: 2024-10-14
Payer: MEDICARE

## 2024-10-14 ENCOUNTER — HOSPITAL ENCOUNTER (OUTPATIENT)
Facility: HOSPITAL | Age: 79
Discharge: HOME | End: 2024-10-14
Attending: STUDENT IN AN ORGANIZED HEALTH CARE EDUCATION/TRAINING PROGRAM | Admitting: STUDENT IN AN ORGANIZED HEALTH CARE EDUCATION/TRAINING PROGRAM
Payer: MEDICARE

## 2024-10-14 VITALS
RESPIRATION RATE: 18 BRPM | DIASTOLIC BLOOD PRESSURE: 67 MMHG | HEART RATE: 76 BPM | TEMPERATURE: 98 F | BODY MASS INDEX: 31.96 KG/M2 | OXYGEN SATURATION: 92 % | WEIGHT: 180.38 LBS | HEIGHT: 63 IN | SYSTOLIC BLOOD PRESSURE: 127 MMHG

## 2024-10-14 DIAGNOSIS — C7A.1 SMALL CELL NEUROENDOCRINE CARCINOMA OF LUNG: ICD-10-CM

## 2024-10-14 DIAGNOSIS — Z79.899 LONG-TERM CURRENT USE OF HIGH RISK MEDICATION OTHER THAN ANTICOAGULANT: ICD-10-CM

## 2024-10-14 PROCEDURE — 36569 INSJ PICC 5 YR+ W/O IMAGING: CPT

## 2024-10-14 PROCEDURE — C1751 CATH, INF, PER/CENT/MIDLINE: HCPCS

## 2024-10-14 PROCEDURE — 99215 OFFICE O/P EST HI 40 MIN: CPT | Mod: PBBFAC | Performed by: STUDENT IN AN ORGANIZED HEALTH CARE EDUCATION/TRAINING PROGRAM

## 2024-10-14 PROCEDURE — 36573 INSJ PICC RS&I 5 YR+: CPT

## 2024-10-14 PROCEDURE — 99215 OFFICE O/P EST HI 40 MIN: CPT | Mod: S$PBB,,, | Performed by: STUDENT IN AN ORGANIZED HEALTH CARE EDUCATION/TRAINING PROGRAM

## 2024-10-14 PROCEDURE — 99999 PR PBB SHADOW E&M-EST. PATIENT-LVL V: CPT | Mod: PBBFAC,,, | Performed by: STUDENT IN AN ORGANIZED HEALTH CARE EDUCATION/TRAINING PROGRAM

## 2024-10-14 RX ORDER — DIPHENHYDRAMINE HYDROCHLORIDE 50 MG/ML
50 INJECTION INTRAMUSCULAR; INTRAVENOUS ONCE AS NEEDED
Status: CANCELLED | OUTPATIENT
Start: 2024-10-15

## 2024-10-14 RX ORDER — SODIUM CHLORIDE 0.9 % (FLUSH) 0.9 %
10 SYRINGE (ML) INJECTION
Status: CANCELLED | OUTPATIENT
Start: 2024-10-16

## 2024-10-14 RX ORDER — HEPARIN 100 UNIT/ML
500 SYRINGE INTRAVENOUS
Status: CANCELLED | OUTPATIENT
Start: 2024-10-16

## 2024-10-14 RX ORDER — EPINEPHRINE 0.3 MG/.3ML
0.3 INJECTION SUBCUTANEOUS ONCE AS NEEDED
Status: CANCELLED | OUTPATIENT
Start: 2024-10-16

## 2024-10-14 RX ORDER — PROCHLORPERAZINE EDISYLATE 5 MG/ML
5 INJECTION INTRAMUSCULAR; INTRAVENOUS ONCE AS NEEDED
Status: CANCELLED | OUTPATIENT
Start: 2024-10-15

## 2024-10-14 RX ORDER — DIPHENHYDRAMINE HYDROCHLORIDE 50 MG/ML
50 INJECTION INTRAMUSCULAR; INTRAVENOUS ONCE AS NEEDED
Status: CANCELLED | OUTPATIENT
Start: 2024-10-16

## 2024-10-14 RX ORDER — HEPARIN 100 UNIT/ML
500 SYRINGE INTRAVENOUS
Status: CANCELLED | OUTPATIENT
Start: 2024-10-17

## 2024-10-14 RX ORDER — SODIUM CHLORIDE 0.9 % (FLUSH) 0.9 %
10 SYRINGE (ML) INJECTION
Status: CANCELLED | OUTPATIENT
Start: 2024-10-17

## 2024-10-14 RX ORDER — SODIUM CHLORIDE 0.9 % (FLUSH) 0.9 %
10 SYRINGE (ML) INJECTION EVERY 6 HOURS
Status: DISCONTINUED | OUTPATIENT
Start: 2024-10-14 | End: 2024-10-14 | Stop reason: HOSPADM

## 2024-10-14 RX ORDER — SODIUM CHLORIDE 0.9 % (FLUSH) 0.9 %
10 SYRINGE (ML) INJECTION
Status: CANCELLED | OUTPATIENT
Start: 2024-10-15

## 2024-10-14 RX ORDER — PROCHLORPERAZINE EDISYLATE 5 MG/ML
5 INJECTION INTRAMUSCULAR; INTRAVENOUS ONCE AS NEEDED
Status: CANCELLED | OUTPATIENT
Start: 2024-10-16

## 2024-10-14 RX ORDER — PROCHLORPERAZINE EDISYLATE 5 MG/ML
5 INJECTION INTRAMUSCULAR; INTRAVENOUS ONCE AS NEEDED
Status: CANCELLED | OUTPATIENT
Start: 2024-10-17

## 2024-10-14 RX ORDER — HEPARIN 100 UNIT/ML
500 SYRINGE INTRAVENOUS
Status: CANCELLED | OUTPATIENT
Start: 2024-10-15

## 2024-10-14 RX ORDER — DIPHENHYDRAMINE HYDROCHLORIDE 50 MG/ML
50 INJECTION INTRAMUSCULAR; INTRAVENOUS ONCE AS NEEDED
Status: CANCELLED | OUTPATIENT
Start: 2024-10-17

## 2024-10-14 RX ORDER — EPINEPHRINE 0.3 MG/.3ML
0.3 INJECTION SUBCUTANEOUS ONCE AS NEEDED
Status: CANCELLED | OUTPATIENT
Start: 2024-10-17

## 2024-10-14 RX ORDER — SODIUM CHLORIDE 0.9 % (FLUSH) 0.9 %
10 SYRINGE (ML) INJECTION
Status: DISCONTINUED | OUTPATIENT
Start: 2024-10-14 | End: 2024-10-14 | Stop reason: HOSPADM

## 2024-10-14 RX ORDER — EPINEPHRINE 0.3 MG/.3ML
0.3 INJECTION SUBCUTANEOUS ONCE AS NEEDED
Status: CANCELLED | OUTPATIENT
Start: 2024-10-15

## 2024-10-14 NOTE — DISCHARGE INSTRUCTIONS
PICC/MIDLINE instructions:    A chest x-ray will be taken to confirm placement for your PICC line. If you received a midline, no x-ray is needed.    You did not receive anesthesia for this procedure, so you may resume normal activities.  No blood pressures or needle sticks to affected extremity.  Keep line clean and dry.  Okay to drive and resume regular diet. Nausea is not an expected finding after this procedure.  Report to ER with any SUDDEN bleeding from site, signs of infection, shortness of breath or severe discomfort.

## 2024-10-14 NOTE — PROCEDURES
Gerald J Lejeune is a 79 y.o. male patient.         PICC  Date/Time: 10/14/2024 10:50 AM  Performed by: Natasha Jasso RN  Consent Done: Yes  Time out: Immediately prior to procedure a time out was called to verify the correct patient, procedure, equipment, support staff and site/side marked as required  Indications: med administration and vascular access  Anesthesia: local infiltration  Local anesthetic: lidocaine 1% without epinephrine  Anesthetic Total (mL): 4  Preparation: skin prepped with ChloraPrep  Skin prep agent dried: skin prep agent completely dried prior to procedure  Sterile barriers: all five maximum sterile barriers used - cap, mask, sterile gown, sterile gloves, and large sterile sheet  Hand hygiene: hand hygiene performed prior to central venous catheter insertion  Location details: left basilic  Catheter type: double lumen  Catheter size: 5 Fr  Catheter Length: 44cm    Ultrasound guidance: yes  Vessel Caliber: medium and patent, compressibility normal  Needle advanced into vessel with real time Ultrasound guidance.  Guidewire confirmed in vessel.  Sterile sheath used.  Number of attempts: 1  Post-procedure: sterile dressing applied, blood return through all ports and chlorhexidine patch    Assessment: placement verified by x-ray  Complications: none          Natasha Jasso RN  10/14/2024

## 2024-10-15 ENCOUNTER — INFUSION (OUTPATIENT)
Dept: INFUSION THERAPY | Facility: HOSPITAL | Age: 79
End: 2024-10-15
Attending: STUDENT IN AN ORGANIZED HEALTH CARE EDUCATION/TRAINING PROGRAM
Payer: MEDICARE

## 2024-10-15 VITALS
HEART RATE: 72 BPM | SYSTOLIC BLOOD PRESSURE: 110 MMHG | TEMPERATURE: 98 F | OXYGEN SATURATION: 90 % | DIASTOLIC BLOOD PRESSURE: 69 MMHG | WEIGHT: 178.13 LBS | BODY MASS INDEX: 31.55 KG/M2

## 2024-10-15 DIAGNOSIS — C7A.1 SMALL CELL NEUROENDOCRINE CARCINOMA OF LUNG: Primary | ICD-10-CM

## 2024-10-15 PROCEDURE — 96375 TX/PRO/DX INJ NEW DRUG ADDON: CPT

## 2024-10-15 PROCEDURE — 25000003 PHARM REV CODE 250: Performed by: STUDENT IN AN ORGANIZED HEALTH CARE EDUCATION/TRAINING PROGRAM

## 2024-10-15 PROCEDURE — 63600175 PHARM REV CODE 636 W HCPCS: Performed by: STUDENT IN AN ORGANIZED HEALTH CARE EDUCATION/TRAINING PROGRAM

## 2024-10-15 PROCEDURE — A4216 STERILE WATER/SALINE, 10 ML: HCPCS | Performed by: STUDENT IN AN ORGANIZED HEALTH CARE EDUCATION/TRAINING PROGRAM

## 2024-10-15 PROCEDURE — 96417 CHEMO IV INFUS EACH ADDL SEQ: CPT

## 2024-10-15 PROCEDURE — 96413 CHEMO IV INFUSION 1 HR: CPT

## 2024-10-15 PROCEDURE — 96367 TX/PROPH/DG ADDL SEQ IV INF: CPT

## 2024-10-15 RX ORDER — HEPARIN 100 UNIT/ML
500 SYRINGE INTRAVENOUS
Status: DISCONTINUED | OUTPATIENT
Start: 2024-10-15 | End: 2024-10-15 | Stop reason: HOSPADM

## 2024-10-15 RX ORDER — EPINEPHRINE 0.3 MG/.3ML
0.3 INJECTION SUBCUTANEOUS ONCE AS NEEDED
Status: DISCONTINUED | OUTPATIENT
Start: 2024-10-15 | End: 2024-10-15 | Stop reason: HOSPADM

## 2024-10-15 RX ORDER — PROCHLORPERAZINE EDISYLATE 5 MG/ML
5 INJECTION INTRAMUSCULAR; INTRAVENOUS ONCE AS NEEDED
Status: DISCONTINUED | OUTPATIENT
Start: 2024-10-15 | End: 2024-10-15 | Stop reason: HOSPADM

## 2024-10-15 RX ORDER — SODIUM CHLORIDE 0.9 % (FLUSH) 0.9 %
10 SYRINGE (ML) INJECTION
Status: DISCONTINUED | OUTPATIENT
Start: 2024-10-15 | End: 2024-10-15 | Stop reason: HOSPADM

## 2024-10-15 RX ORDER — DIPHENHYDRAMINE HYDROCHLORIDE 50 MG/ML
50 INJECTION INTRAMUSCULAR; INTRAVENOUS ONCE AS NEEDED
Status: DISCONTINUED | OUTPATIENT
Start: 2024-10-15 | End: 2024-10-15 | Stop reason: HOSPADM

## 2024-10-15 RX ADMIN — Medication 10 ML: at 12:10

## 2024-10-15 RX ADMIN — HEPARIN 500 UNITS: 100 SYRINGE at 12:10

## 2024-10-15 RX ADMIN — APREPITANT 130 MG: 130 INJECTION, EMULSION INTRAVENOUS at 10:10

## 2024-10-15 RX ADMIN — DEXAMETHASONE SODIUM PHOSPHATE 0.25 MG: 4 INJECTION, SOLUTION INTRA-ARTICULAR; INTRALESIONAL; INTRAMUSCULAR; INTRAVENOUS; SOFT TISSUE at 10:10

## 2024-10-15 RX ADMIN — CARBOPLATIN 505 MG: 10 INJECTION, SOLUTION INTRAVENOUS at 10:10

## 2024-10-15 RX ADMIN — ATEZOLIZUMAB 1200 MG: 1200 INJECTION, SOLUTION INTRAVENOUS at 08:10

## 2024-10-15 RX ADMIN — SODIUM CHLORIDE: 9 INJECTION, SOLUTION INTRAVENOUS at 08:10

## 2024-10-15 RX ADMIN — SODIUM CHLORIDE 152 MG: 0.9 INJECTION, SOLUTION INTRAVENOUS at 11:10

## 2024-10-16 ENCOUNTER — INFUSION (OUTPATIENT)
Dept: INFUSION THERAPY | Facility: HOSPITAL | Age: 79
End: 2024-10-16
Attending: STUDENT IN AN ORGANIZED HEALTH CARE EDUCATION/TRAINING PROGRAM
Payer: MEDICARE

## 2024-10-16 VITALS
OXYGEN SATURATION: 91 % | RESPIRATION RATE: 18 BRPM | HEART RATE: 69 BPM | TEMPERATURE: 98 F | SYSTOLIC BLOOD PRESSURE: 109 MMHG | DIASTOLIC BLOOD PRESSURE: 67 MMHG

## 2024-10-16 DIAGNOSIS — C7A.1 SMALL CELL NEUROENDOCRINE CARCINOMA OF LUNG: Primary | ICD-10-CM

## 2024-10-16 LAB — FUNGUS SPEC CULT: NORMAL

## 2024-10-16 PROCEDURE — 25000003 PHARM REV CODE 250: Performed by: STUDENT IN AN ORGANIZED HEALTH CARE EDUCATION/TRAINING PROGRAM

## 2024-10-16 PROCEDURE — 63600175 PHARM REV CODE 636 W HCPCS: Performed by: STUDENT IN AN ORGANIZED HEALTH CARE EDUCATION/TRAINING PROGRAM

## 2024-10-16 PROCEDURE — A4216 STERILE WATER/SALINE, 10 ML: HCPCS | Performed by: STUDENT IN AN ORGANIZED HEALTH CARE EDUCATION/TRAINING PROGRAM

## 2024-10-16 PROCEDURE — 96413 CHEMO IV INFUSION 1 HR: CPT

## 2024-10-16 RX ORDER — HEPARIN 100 UNIT/ML
500 SYRINGE INTRAVENOUS
Status: DISCONTINUED | OUTPATIENT
Start: 2024-10-16 | End: 2024-10-16 | Stop reason: HOSPADM

## 2024-10-16 RX ORDER — SODIUM CHLORIDE 0.9 % (FLUSH) 0.9 %
10 SYRINGE (ML) INJECTION
Status: DISCONTINUED | OUTPATIENT
Start: 2024-10-16 | End: 2024-10-16 | Stop reason: HOSPADM

## 2024-10-16 RX ORDER — EPINEPHRINE 0.3 MG/.3ML
0.3 INJECTION SUBCUTANEOUS ONCE AS NEEDED
Status: DISCONTINUED | OUTPATIENT
Start: 2024-10-16 | End: 2024-10-16 | Stop reason: HOSPADM

## 2024-10-16 RX ORDER — DIPHENHYDRAMINE HYDROCHLORIDE 50 MG/ML
50 INJECTION INTRAMUSCULAR; INTRAVENOUS ONCE AS NEEDED
Status: DISCONTINUED | OUTPATIENT
Start: 2024-10-16 | End: 2024-10-16 | Stop reason: HOSPADM

## 2024-10-16 RX ORDER — PROCHLORPERAZINE EDISYLATE 5 MG/ML
5 INJECTION INTRAMUSCULAR; INTRAVENOUS ONCE AS NEEDED
Status: DISCONTINUED | OUTPATIENT
Start: 2024-10-16 | End: 2024-10-16 | Stop reason: HOSPADM

## 2024-10-16 RX ADMIN — SODIUM CHLORIDE: 9 INJECTION, SOLUTION INTRAVENOUS at 01:10

## 2024-10-16 RX ADMIN — ETOPOSIDE 152 MG: 20 INJECTION INTRAVENOUS at 01:10

## 2024-10-16 RX ADMIN — Medication 10 ML: at 02:10

## 2024-10-16 NOTE — PLAN OF CARE
Plan of care reviewed with patient and S/O; both in agreement. C1D2 completed; patient tolerated well. Appts reviewed and printed.

## 2024-10-17 ENCOUNTER — INFUSION (OUTPATIENT)
Dept: INFUSION THERAPY | Facility: HOSPITAL | Age: 79
End: 2024-10-17
Attending: STUDENT IN AN ORGANIZED HEALTH CARE EDUCATION/TRAINING PROGRAM
Payer: MEDICARE

## 2024-10-17 ENCOUNTER — TELEPHONE (OUTPATIENT)
Dept: HEMATOLOGY/ONCOLOGY | Facility: CLINIC | Age: 79
End: 2024-10-17
Payer: MEDICARE

## 2024-10-17 VITALS
OXYGEN SATURATION: 92 % | SYSTOLIC BLOOD PRESSURE: 112 MMHG | TEMPERATURE: 98 F | DIASTOLIC BLOOD PRESSURE: 66 MMHG | HEART RATE: 67 BPM | BODY MASS INDEX: 31.56 KG/M2 | RESPIRATION RATE: 18 BRPM | HEIGHT: 63 IN | WEIGHT: 178.13 LBS

## 2024-10-17 DIAGNOSIS — C7A.1 SMALL CELL NEUROENDOCRINE CARCINOMA OF LUNG: Primary | ICD-10-CM

## 2024-10-17 PROCEDURE — A4216 STERILE WATER/SALINE, 10 ML: HCPCS | Performed by: STUDENT IN AN ORGANIZED HEALTH CARE EDUCATION/TRAINING PROGRAM

## 2024-10-17 PROCEDURE — 25000003 PHARM REV CODE 250: Performed by: STUDENT IN AN ORGANIZED HEALTH CARE EDUCATION/TRAINING PROGRAM

## 2024-10-17 PROCEDURE — 63600175 PHARM REV CODE 636 W HCPCS: Performed by: STUDENT IN AN ORGANIZED HEALTH CARE EDUCATION/TRAINING PROGRAM

## 2024-10-17 PROCEDURE — 96413 CHEMO IV INFUSION 1 HR: CPT

## 2024-10-17 RX ORDER — HEPARIN 100 UNIT/ML
500 SYRINGE INTRAVENOUS
Status: DISCONTINUED | OUTPATIENT
Start: 2024-10-17 | End: 2024-10-17 | Stop reason: HOSPADM

## 2024-10-17 RX ORDER — DIPHENHYDRAMINE HYDROCHLORIDE 50 MG/ML
50 INJECTION INTRAMUSCULAR; INTRAVENOUS ONCE AS NEEDED
Status: DISCONTINUED | OUTPATIENT
Start: 2024-10-17 | End: 2024-10-17 | Stop reason: HOSPADM

## 2024-10-17 RX ORDER — PROCHLORPERAZINE EDISYLATE 5 MG/ML
5 INJECTION INTRAMUSCULAR; INTRAVENOUS ONCE AS NEEDED
Status: DISCONTINUED | OUTPATIENT
Start: 2024-10-17 | End: 2024-10-17 | Stop reason: HOSPADM

## 2024-10-17 RX ORDER — EPINEPHRINE 0.3 MG/.3ML
0.3 INJECTION SUBCUTANEOUS ONCE AS NEEDED
Status: DISCONTINUED | OUTPATIENT
Start: 2024-10-17 | End: 2024-10-17 | Stop reason: HOSPADM

## 2024-10-17 RX ORDER — SODIUM CHLORIDE 0.9 % (FLUSH) 0.9 %
10 SYRINGE (ML) INJECTION
Status: DISCONTINUED | OUTPATIENT
Start: 2024-10-17 | End: 2024-10-17 | Stop reason: HOSPADM

## 2024-10-17 RX ADMIN — HEPARIN 500 UNITS: 100 SYRINGE at 03:10

## 2024-10-17 RX ADMIN — SODIUM CHLORIDE: 9 INJECTION, SOLUTION INTRAVENOUS at 02:10

## 2024-10-17 RX ADMIN — ETOPOSIDE 152 MG: 20 INJECTION INTRAVENOUS at 02:10

## 2024-10-17 RX ADMIN — Medication 10 ML: at 03:10

## 2024-10-17 NOTE — PLAN OF CARE
Plan of care reviewed with patient/significant other;both in agreement. C1D3 completed. Appts reviewed; received copy at 10/16/24 visit.

## 2024-10-17 NOTE — TELEPHONE ENCOUNTER
Received call from patient's significant other inquiring about transportation for future appointments. Patient's insurance does not provide transportation and patient lives outside of [facility]. Contacted [facility] and was redirected to [facility]. They are able to provide transportation from [place] to [place] round [facility] for $35. Patient's significant other was given this information. Phone number [phone]. She was advised to contact them 48 hours in advance.

## 2024-10-18 ENCOUNTER — HOSPITAL ENCOUNTER (OUTPATIENT)
Dept: RADIOLOGY | Facility: HOSPITAL | Age: 79
Discharge: HOME | End: 2024-10-18
Attending: STUDENT IN AN ORGANIZED HEALTH CARE EDUCATION/TRAINING PROGRAM
Payer: MEDICARE

## 2024-10-18 DIAGNOSIS — C7A.1 SMALL CELL NEUROENDOCRINE CARCINOMA OF LUNG: ICD-10-CM

## 2024-10-18 PROCEDURE — 32555 ASPIRATE PLEURA W/ IMAGING: CPT

## 2024-10-18 RX ORDER — LIDOCAINE HYDROCHLORIDE 10 MG/ML
INJECTION, SOLUTION INFILTRATION; PERINEURAL
Status: DISCONTINUED
Start: 2024-10-18 | End: 2024-10-19 | Stop reason: HOSPADM

## 2024-10-18 NOTE — DISCHARGE SUMMARY
Radiology Post-Procedure Note    Pre Op Diagnosis: Right Pleural Effusion    Post Op Diagnosis: Same    Secondary Diagnoses:   Problem List Items Addressed This Visit       Small cell neuroendocrine carcinoma of lung    Relevant Orders    US Thoracentesis with Imaging, Aspiration Only        Procedure: US Guided Right Thoracentesis    Procedure performed by: Dillan Collado MD    Assistant: None    Written Informed Consent Obtained: Yes    Specimen Removed: None    Estimated Blood Loss: <10 cc    Condition: Stable    Outcome: The patient tolerated the procedure well and was without complications.    For further details please see the imaging report associated.    Disposition: Home or Self Care    Follow Up: With primary care provider    Discharge Instructions:  No discharge procedures on file.       Time Spent On Discharge: 2 minutes

## 2024-10-18 NOTE — INTERVAL H&P NOTE
The patient has been examined and the H&P has been reviewed:    I concur with the findings and no changes have occurred since H&P was written. 78 yo M with Right side Lung Cancer and Right Pleural Effusion presents for thoracentesis.        There are no hospital problems to display for this patient.

## 2024-10-22 NOTE — PROGRESS NOTES
Subjective:       Patient ID: Gerald J Lejeune is a 79 y.o. male.    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 He has a 150+ pack year smoking history, quit 1 year prior to presentation, on 3-4L NC at baseline. He presented to [facility] in September '24 for new onset seizures. Workup was done which was unrevealing of etiology, but during that workup he was found to have large volume R pleural effusion, large mediastinal LAD, and R mainstem mucous plugging vs mass obstruction. He has had his R sided pleural effusion drained twice due to quick reaccumulation, cytology negative x2. He underwent EBUS on 10/2 where within the R mainstem bronchus a near complete occlusion due to fungating mass was noted. Biopsy was done with final pathology revealed grade 3 neuroendocrine carcinoma consistent with small cell lung cancer.     His diagnosis, staging, and prognosis. The NCCN guidelines when discussing treatment therapy were referenced. The common side effects of chemotherapy and immunotherapy in detail including fatigue, nausea, vomiting, constipation, diarrhea, and increased risk of infections were explained. His median OS with and without treatment was discussed.  The role of palliative care as a supplement to treatment to support him and his family with the symptoms of his cancer was also discussed. They were agreeable to proceed.     October '24 PET with evidence of extensive disease to R lobe with pleural effusion and hypermetabolic LAD. No evidence of disease outside of the chest. He have since transitioned into 1st line carboplatin AUC 4, Etoposide 80mg/m2, and Atezolizumab 1200mg Q3w x 4 cycles in the palliative setting then repeat PET to assess response. Chemotherapy dose reduced due to patients  age, comorbidities, and performance status.     Interval History:   Patient presents to clinic today for NP follow up appointment and labs for toxicity check after C1 Carbo/Etop/Atezolizumab on 10/15.  Thoracentesis on 10/18 with 0.9ml removed.   He states he is doing okay today.  Reports a few mouth sores that resolved with salt water gargles.   He has intermittent episodes of short term memory loss and confusion.  SOB have improves since thoracentesis. He wears home o2 and feels he  He has left lower ext swelling with +1 pitting edema.   He denies any pain. He is currently taking Lasix.   Discussed labs and in detail with patient and family.  Otherwise, he has no further complaints or concerns.     Past Medical History:   Diagnosis Date    Adenopathy     Anesthesia complication     takes longer to wake    Anxiety     BPH (benign prostatic hyperplasia)     COPD (chronic obstructive pulmonary disease)     Dementia     Depression     Hyperlipidemia     Hypertension     Obesity     Oxygen dependent     4L/NC    PAD (peripheral artery disease)     Post-obstructive pneumonia due to foreign body aspiration     Levaquin    Seizures     Shortness of breath     Sleep apnea     CPAP    Walker as ambulation aid       Past Surgical History:   Procedure Laterality Date    BELOW KNEE AMPUTATION OF LOWER EXTREMITY Right     BRONCHOSCOPY, WITH TRANSBRONCHIAL BIOPSY Right 10/2/2024    Procedure: BRONCHOSCOPY, WITH TRANSBRONCHIAL BIOPSY;  Surgeon: Lee Suarez Jr., MD, [facility];  Location: [facility] BRONCHOSCOPY LAB;  Service: Pulmonary;  Laterality: Right;  RUL    ENDOBRONCHIAL ULTRASOUND N/A 10/2/2024    Procedure: ENDOBRONCHIAL ULTRASOUND (EBUS)  with FLURO;  Surgeon: Lee Suarez Jr., MD, [facility];  Location: [facility] BRONCHOSCOPY LAB;  Service: Pulmonary;  Laterality: N/A;    INSERTION, STENT, BARE METAL, ARTERY, PERIPHERAL  2019    PERIPHERALLY INSERTED CENTRAL CATHETER INSERTION N/A 10/14/2024    Procedure: Insertion-picc;  Surgeon:  Lejeune, Elizabeth Kennedy, MD;  Location: [facility];  Service: Oncology;  Laterality: N/A;    THORACENTESIS  09/21/2024     Social History     Socioeconomic History    Marital status:    Tobacco Use    Smoking status: Former     Types: Cigarettes    Smokeless tobacco: Never   Substance and Sexual Activity    Alcohol use: Yes     Alcohol/week: 2.0 standard drinks of alcohol     Types: 2 Cans of beer per week    Drug use: Never     Social Drivers of Health     Financial Resource Strain: Low Risk  (10/1/2024)    Overall Financial Resource Strain (CARDIA)     Difficulty of Paying Living Expenses: Not hard at all   Food Insecurity: No Food Insecurity (10/1/2024)    Hunger Vital Sign     Worried About Running Out of Food in the Last Year: Never true     Ran Out of Food in the Last Year: Never true   Transportation Needs: No Transportation Needs (10/1/2024)    TRANSPORTATION NEEDS     Transportation : No   Stress: No Stress Concern Present (10/1/2024)    [employer] - Occupational Stress Questionnaire     Feeling of Stress : Only a little   Housing Stability: Low Risk  (10/1/2024)    Housing Stability Vital Sign     Unable to Pay for Housing in the Last Year: No     Homeless in the Last Year: No      No family history on file.   Review of patient's allergies indicates:  No Known Allergies   Review of Systems   Constitutional:  Negative for activity change, appetite change, chills, fatigue, fever and unexpected weight change.   HENT:  Negative for mouth sores and sore throat.    Eyes:  Negative for visual disturbance.   Respiratory:  Negative for cough and shortness of breath.    Cardiovascular:  Negative for chest pain.   Gastrointestinal:  Negative for abdominal pain, constipation, diarrhea, nausea and vomiting.   Endocrine: Negative for polyuria.   Genitourinary:  Negative for dysuria and frequency.   Musculoskeletal:  Negative for back pain.   Integumentary:  Negative for rash.    Allergic/Immunologic: Negative for frequent infections.   Neurological:  Negative for weakness and headaches.   Hematological:  Negative for adenopathy. Does not bruise/bleed easily.   Psychiatric/Behavioral:  The patient is nervous/anxious.          Objective:      Vitals:    10/23/24 0901   BP: 118/68   Pulse: 74   Resp: 16   Temp: 97.7 °F (36.5 °C)     Physical Exam  Constitutional:       General: He is not in acute distress.     Appearance: Normal appearance. He is ill-appearing.   HENT:      Head: Normocephalic and atraumatic.      Nose: Nose normal.      Mouth/Throat:      Mouth: Mucous membranes are moist.      Pharynx: Oropharynx is clear.   Eyes:      Extraocular Movements: Extraocular movements intact.      Conjunctiva/sclera: Conjunctivae normal.      Pupils: Pupils are equal, round, and reactive to light.   Cardiovascular:      Rate and Rhythm: Normal rate and regular rhythm.      Pulses: Normal pulses.      Heart sounds: Normal heart sounds. No murmur heard.  Pulmonary:      Effort: Pulmonary effort is normal. No respiratory distress.      Comments: 3L NC  Coarse minimal BS on R side  Abdominal:      General: There is no distension.      Palpations: Abdomen is soft.      Tenderness: There is no abdominal tenderness.   Musculoskeletal:         General: Deformity (R BKA) present. Normal range of motion.      Cervical back: Normal range of motion and neck supple.      Right lower leg: No edema.      Left lower leg: No edema.   Lymphadenopathy:      Cervical: No cervical adenopathy.   Skin:     General: Skin is warm and dry.   Neurological:      General: No focal deficit present.      Mental Status: He is alert and oriented to person, place, and time.       LABS AND IMAGING REVIEWED IN EPIC    IMAGIN24 MRI Brain:  Impression:  1.  No acute intra findings or metastatic evidence.  2.  Chronic microangiopathic ischemia and atrophy.    24 CT Chest:  IMPRESSION  1. Right near obstructive mucous  plugging versus endobronchial lesion with almost complete consolidative collapse of the right lung.  Underlying malignancy is of concern given associated mediastinal adenopathy.  Superimposed infectious process not excluded.  2. Incidental nonobstructing right nephrolithiasis.  3. Additional chronic secondary findings discussed above.    10/9/24 PET/CT:  Impression:  Large conglomerate right hilar/perihilar mass with moderate FDG avidity.  There are postobstructive changes.  Small right pleural effusion with FDG avid pleural nodule dependently at the right hemithorax.  Hypermetabolic right hilar, mediastinal and right supraclavicular lymph nodes.      PATHOLOGY:  10/2/24 EBUS:  FINAL DIAGNOSIS   1. LYMPH NODE 4R, EBUS-GUIDED BIOPSY:   METASTATIC SMALL CELL CARCINOMA (HIGH GRADE NEUROENDOCRINE CARCINOMA).   IMMUNOHISTOCHEMICAL STAINS PERFORMED WITH ADEQUATE CONTROLS:   KI-67, LOW MOLECULAR WEIGHT CYTOKERATIN, CD56, SYNAPTOPHYSIN, TTF-1 AND   CHROMOGRANIN:  INCONCLUSIVE DUE TO EXTENSIVE NECROSIS.      2. RIGHT LUNG, UPPER LOBE, BIOPSY:   HIGH GRADE NEUROENDOCRINE CARCINOMA (SMALL CELL CARCINOMA).   IMMUNOHISTOCHEMICAL STAINS PERFORMED WITH ADEQUATE CONTROLS:   CD56, LOW MOLECULAR WEIGHT CYTOKERATIN, SYNAPTOPHYSIN, CHROMOGRANIN AND TTF-1:   STRONGLY POSITIVE IN MALIGNANT CELLS.   KI-67:  SHOWS NEAR 100% NUCLEAR POSITIVITY IN MALIGNANT CELLS.     Assessment:   Extensive Stage R Lung SCLC - with recurrent pleural effusions and near obstructing R mainstem mass. Would be a candidate for palliative chemotherapy and would consider palliative radiation given the near occlusion of his mainstem if no immediate response.     I discussed his diagnosis, stage, and overall prognosis. We discussed the goals with therapy and without and that the goals of treatment were palliative. He does wish to proceed.     Plan:   - Plan for C2D1-3 of Carbo/Etop/Atezolizumab on 11/5-7. Dose reduce Carboplatin to AUC 4 and Etoposide to 80mg/m2 due  to patient tolerance concerns.   - Okay to increase Lasix 20mg po daily to 2 tabs for 2-3 days for left lower ext edema  - Compression socks to left lower ext.  - RTC on 11/4 for NP visit, labs same day - CBC, CMP, TSH    Call Daughter with all appts/information    I spent a total of 40 minutes on the day of the visit.This includes face to face time and non-face to face time preparing to see the patient (eg, review of tests), obtaining and/or reviewing separately obtained history, documenting clinical information in the electronic or other health record, independently interpreting results and communicating results to the patient/family/caregiver, or care coordinator.      CLEMENTINA Don-ALBERTO  Hematology/Oncology   [facility]

## 2024-10-23 ENCOUNTER — OFFICE VISIT (OUTPATIENT)
Dept: HEMATOLOGY/ONCOLOGY | Facility: CLINIC | Age: 79
End: 2024-10-23
Payer: MEDICARE

## 2024-10-23 ENCOUNTER — INFUSION (OUTPATIENT)
Dept: INFUSION THERAPY | Facility: HOSPITAL | Age: 79
End: 2024-10-23
Attending: STUDENT IN AN ORGANIZED HEALTH CARE EDUCATION/TRAINING PROGRAM
Payer: MEDICARE

## 2024-10-23 VITALS
WEIGHT: 177.38 LBS | RESPIRATION RATE: 16 BRPM | HEIGHT: 63 IN | SYSTOLIC BLOOD PRESSURE: 118 MMHG | HEART RATE: 74 BPM | OXYGEN SATURATION: 92 % | BODY MASS INDEX: 31.43 KG/M2 | DIASTOLIC BLOOD PRESSURE: 68 MMHG | TEMPERATURE: 98 F

## 2024-10-23 VITALS
OXYGEN SATURATION: 96 % | HEART RATE: 76 BPM | RESPIRATION RATE: 18 BRPM | WEIGHT: 178.13 LBS | SYSTOLIC BLOOD PRESSURE: 123 MMHG | HEIGHT: 63 IN | BODY MASS INDEX: 31.56 KG/M2 | TEMPERATURE: 98 F | DIASTOLIC BLOOD PRESSURE: 70 MMHG

## 2024-10-23 DIAGNOSIS — C7A.1 SMALL CELL NEUROENDOCRINE CARCINOMA OF LUNG: Primary | ICD-10-CM

## 2024-10-23 DIAGNOSIS — E87.6 HYPOKALEMIA: Primary | ICD-10-CM

## 2024-10-23 DIAGNOSIS — Z79.899 LONG-TERM CURRENT USE OF HIGH RISK MEDICATION OTHER THAN ANTICOAGULANT: ICD-10-CM

## 2024-10-23 PROCEDURE — A4216 STERILE WATER/SALINE, 10 ML: HCPCS | Performed by: STUDENT IN AN ORGANIZED HEALTH CARE EDUCATION/TRAINING PROGRAM

## 2024-10-23 PROCEDURE — 63600175 PHARM REV CODE 636 W HCPCS: Performed by: STUDENT IN AN ORGANIZED HEALTH CARE EDUCATION/TRAINING PROGRAM

## 2024-10-23 PROCEDURE — 99999 PR PBB SHADOW E&M-EST. PATIENT-LVL IV: CPT | Mod: PBBFAC,,,

## 2024-10-23 PROCEDURE — 99215 OFFICE O/P EST HI 40 MIN: CPT | Mod: S$PBB,,,

## 2024-10-23 PROCEDURE — 25000003 PHARM REV CODE 250: Performed by: STUDENT IN AN ORGANIZED HEALTH CARE EDUCATION/TRAINING PROGRAM

## 2024-10-23 PROCEDURE — 96523 IRRIG DRUG DELIVERY DEVICE: CPT

## 2024-10-23 PROCEDURE — 99214 OFFICE O/P EST MOD 30 MIN: CPT | Mod: PBBFAC,25

## 2024-10-23 RX ORDER — HEPARIN 100 UNIT/ML
500 SYRINGE INTRAVENOUS
Status: DISCONTINUED | OUTPATIENT
Start: 2024-10-23 | End: 2024-10-23 | Stop reason: HOSPADM

## 2024-10-23 RX ORDER — HEPARIN 100 UNIT/ML
500 SYRINGE INTRAVENOUS
Status: COMPLETED | OUTPATIENT
Start: 2024-10-23 | End: 2024-10-23

## 2024-10-23 RX ORDER — SODIUM CHLORIDE 0.9 % (FLUSH) 0.9 %
10 SYRINGE (ML) INJECTION
OUTPATIENT
Start: 2024-10-23

## 2024-10-23 RX ORDER — HEPARIN 100 UNIT/ML
500 SYRINGE INTRAVENOUS
OUTPATIENT
Start: 2024-10-23

## 2024-10-23 RX ORDER — SODIUM CHLORIDE 0.9 % (FLUSH) 0.9 %
10 SYRINGE (ML) INJECTION
Status: COMPLETED | OUTPATIENT
Start: 2024-10-23 | End: 2024-10-23

## 2024-10-23 RX ORDER — SODIUM CHLORIDE 0.9 % (FLUSH) 0.9 %
10 SYRINGE (ML) INJECTION
Status: DISCONTINUED | OUTPATIENT
Start: 2024-10-23 | End: 2024-10-23 | Stop reason: HOSPADM

## 2024-10-23 RX ORDER — POTASSIUM CHLORIDE 20 MEQ/1
20 TABLET, EXTENDED RELEASE ORAL 2 TIMES DAILY
Qty: 14 TABLET | Refills: 0 | Status: SHIPPED | OUTPATIENT
Start: 2024-10-23 | End: 2024-10-30

## 2024-10-23 RX ADMIN — Medication 10 ML: at 08:10

## 2024-10-23 RX ADMIN — HEPARIN 500 UNITS: 100 SYRINGE at 08:10

## 2024-10-23 NOTE — PLAN OF CARE
PICC care dressing change completed; tolerated well; next appointments discussed with patient; discharged home in stable condition

## 2024-10-30 ENCOUNTER — INFUSION (OUTPATIENT)
Dept: INFUSION THERAPY | Facility: HOSPITAL | Age: 79
End: 2024-10-30
Attending: STUDENT IN AN ORGANIZED HEALTH CARE EDUCATION/TRAINING PROGRAM
Payer: MEDICARE

## 2024-10-30 VITALS
WEIGHT: 180.38 LBS | HEIGHT: 63 IN | HEART RATE: 67 BPM | BODY MASS INDEX: 31.96 KG/M2 | TEMPERATURE: 98 F | OXYGEN SATURATION: 95 % | SYSTOLIC BLOOD PRESSURE: 118 MMHG | RESPIRATION RATE: 18 BRPM | DIASTOLIC BLOOD PRESSURE: 68 MMHG

## 2024-10-30 DIAGNOSIS — C7A.1 SMALL CELL NEUROENDOCRINE CARCINOMA OF LUNG: Primary | ICD-10-CM

## 2024-10-30 PROCEDURE — 63600175 PHARM REV CODE 636 W HCPCS: Performed by: STUDENT IN AN ORGANIZED HEALTH CARE EDUCATION/TRAINING PROGRAM

## 2024-10-30 PROCEDURE — 96523 IRRIG DRUG DELIVERY DEVICE: CPT

## 2024-10-30 PROCEDURE — A4216 STERILE WATER/SALINE, 10 ML: HCPCS | Performed by: STUDENT IN AN ORGANIZED HEALTH CARE EDUCATION/TRAINING PROGRAM

## 2024-10-30 PROCEDURE — 25000003 PHARM REV CODE 250: Performed by: STUDENT IN AN ORGANIZED HEALTH CARE EDUCATION/TRAINING PROGRAM

## 2024-10-30 RX ORDER — SODIUM CHLORIDE 0.9 % (FLUSH) 0.9 %
10 SYRINGE (ML) INJECTION
Status: COMPLETED | OUTPATIENT
Start: 2024-10-30 | End: 2024-10-30

## 2024-10-30 RX ORDER — SODIUM CHLORIDE 0.9 % (FLUSH) 0.9 %
10 SYRINGE (ML) INJECTION
OUTPATIENT
Start: 2024-10-30

## 2024-10-30 RX ORDER — HEPARIN 100 UNIT/ML
500 SYRINGE INTRAVENOUS
OUTPATIENT
Start: 2024-10-30

## 2024-10-30 RX ORDER — HEPARIN 100 UNIT/ML
500 SYRINGE INTRAVENOUS
Status: COMPLETED | OUTPATIENT
Start: 2024-10-30 | End: 2024-10-30

## 2024-10-30 RX ORDER — HEPARIN 100 UNIT/ML
500 SYRINGE INTRAVENOUS
Status: DISCONTINUED | OUTPATIENT
Start: 2024-10-30 | End: 2024-10-30 | Stop reason: HOSPADM

## 2024-10-30 RX ADMIN — SODIUM CHLORIDE, PRESERVATIVE FREE 10 ML: 5 INJECTION INTRAVENOUS at 09:10

## 2024-10-30 RX ADMIN — HEPARIN 500 UNITS: 100 SYRINGE at 09:10

## 2024-11-04 ENCOUNTER — OFFICE VISIT (OUTPATIENT)
Dept: HEMATOLOGY/ONCOLOGY | Facility: CLINIC | Age: 79
End: 2024-11-04
Payer: MEDICARE

## 2024-11-04 ENCOUNTER — LAB VISIT (OUTPATIENT)
Dept: LAB | Facility: HOSPITAL | Age: 79
End: 2024-11-04
Attending: STUDENT IN AN ORGANIZED HEALTH CARE EDUCATION/TRAINING PROGRAM
Payer: MEDICARE

## 2024-11-04 VITALS
SYSTOLIC BLOOD PRESSURE: 104 MMHG | RESPIRATION RATE: 18 BRPM | TEMPERATURE: 98 F | HEIGHT: 63 IN | OXYGEN SATURATION: 91 % | DIASTOLIC BLOOD PRESSURE: 61 MMHG | WEIGHT: 178.31 LBS | HEART RATE: 71 BPM | BODY MASS INDEX: 31.59 KG/M2

## 2024-11-04 DIAGNOSIS — C7A.1 SMALL CELL NEUROENDOCRINE CARCINOMA OF LUNG: ICD-10-CM

## 2024-11-04 DIAGNOSIS — Z79.899 LONG-TERM CURRENT USE OF HIGH RISK MEDICATION OTHER THAN ANTICOAGULANT: ICD-10-CM

## 2024-11-04 DIAGNOSIS — C7A.1 SMALL CELL NEUROENDOCRINE CARCINOMA OF LUNG: Primary | ICD-10-CM

## 2024-11-04 LAB
ALBUMIN SERPL-MCNC: 3 G/DL (ref 3.4–4.8)
ALBUMIN/GLOB SERPL: 0.9 RATIO (ref 1.1–2)
ALP SERPL-CCNC: 95 UNIT/L (ref 40–150)
ALT SERPL-CCNC: 12 UNIT/L (ref 0–55)
ANION GAP SERPL CALC-SCNC: 10 MEQ/L
AST SERPL-CCNC: 15 UNIT/L (ref 5–34)
BASOPHILS # BLD AUTO: 0.02 X10(3)/MCL
BASOPHILS NFR BLD AUTO: 0.8 %
BILIRUB SERPL-MCNC: 0.3 MG/DL
BUN SERPL-MCNC: 7.5 MG/DL (ref 8.4–25.7)
CALCIUM SERPL-MCNC: 9.2 MG/DL (ref 8.8–10)
CHLORIDE SERPL-SCNC: 106 MMOL/L (ref 98–107)
CO2 SERPL-SCNC: 25 MMOL/L (ref 23–31)
CREAT SERPL-MCNC: 0.69 MG/DL (ref 0.72–1.25)
CREAT/UREA NIT SERPL: 11
EOSINOPHIL # BLD AUTO: 0 X10(3)/MCL (ref 0–0.9)
EOSINOPHIL NFR BLD AUTO: 0 %
ERYTHROCYTE [DISTWIDTH] IN BLOOD BY AUTOMATED COUNT: 14.5 % (ref 11.5–17)
GFR SERPLBLD CREATININE-BSD FMLA CKD-EPI: >60 ML/MIN/1.73/M2
GLOBULIN SER-MCNC: 3.5 GM/DL (ref 2.4–3.5)
GLUCOSE SERPL-MCNC: 85 MG/DL (ref 82–115)
HCT VFR BLD AUTO: 36.3 % (ref 42–52)
HGB BLD-MCNC: 12.1 G/DL (ref 14–18)
IMM GRANULOCYTES # BLD AUTO: 0.03 X10(3)/MCL (ref 0–0.04)
IMM GRANULOCYTES NFR BLD AUTO: 1.1 %
LYMPHOCYTES # BLD AUTO: 1.12 X10(3)/MCL (ref 0.6–4.6)
LYMPHOCYTES NFR BLD AUTO: 42.7 %
MAGNESIUM SERPL-MCNC: 2.1 MG/DL (ref 1.6–2.6)
MCH RBC QN AUTO: 29.4 PG (ref 27–31)
MCHC RBC AUTO-ENTMCNC: 33.3 G/DL (ref 33–36)
MCV RBC AUTO: 88.1 FL (ref 80–94)
MONOCYTES # BLD AUTO: 0.68 X10(3)/MCL (ref 0.1–1.3)
MONOCYTES NFR BLD AUTO: 26 %
NEUTROPHILS # BLD AUTO: 0.77 X10(3)/MCL (ref 2.1–9.2)
NEUTROPHILS NFR BLD AUTO: 29.4 %
PLATELET # BLD AUTO: 232 X10(3)/MCL (ref 130–400)
PMV BLD AUTO: 9.1 FL (ref 7.4–10.4)
POTASSIUM SERPL-SCNC: 4.3 MMOL/L (ref 3.5–5.1)
PROT SERPL-MCNC: 6.5 GM/DL (ref 5.8–7.6)
RBC # BLD AUTO: 4.12 X10(6)/MCL (ref 4.7–6.1)
SODIUM SERPL-SCNC: 141 MMOL/L (ref 136–145)
TSH SERPL-ACNC: 0.77 UIU/ML (ref 0.35–4.94)
WBC # BLD AUTO: 2.62 X10(3)/MCL (ref 4.5–11.5)

## 2024-11-04 PROCEDURE — 80053 COMPREHEN METABOLIC PANEL: CPT

## 2024-11-04 PROCEDURE — 99214 OFFICE O/P EST MOD 30 MIN: CPT | Mod: PBBFAC

## 2024-11-04 PROCEDURE — 84443 ASSAY THYROID STIM HORMONE: CPT

## 2024-11-04 PROCEDURE — 85025 COMPLETE CBC W/AUTO DIFF WBC: CPT

## 2024-11-04 PROCEDURE — 99999 PR PBB SHADOW E&M-EST. PATIENT-LVL IV: CPT | Mod: PBBFAC,,,

## 2024-11-04 PROCEDURE — 36415 COLL VENOUS BLD VENIPUNCTURE: CPT

## 2024-11-04 PROCEDURE — 99215 OFFICE O/P EST HI 40 MIN: CPT | Mod: S$PBB,,,

## 2024-11-04 PROCEDURE — 83735 ASSAY OF MAGNESIUM: CPT

## 2024-11-04 NOTE — PROGRESS NOTES
Received INR results from 9/6 of 2.5. Called pt confirmed he is currently still taking 5 mg daily as directed. Pt denies any missed or extra doses. Pt denies any s/s of bleeding or any other negative s/s.    Subjective:       Patient ID: Gerald J Lejeune is a 79 y.o. male.    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 He has a 150+ pack year smoking history, quit 1 year prior to presentation, on 3-4L NC at baseline. He presented to [facility] in September '24 for new onset seizures. Workup was done which was unrevealing of etiology, but during that workup he was found to have large volume R pleural effusion, large mediastinal LAD, and R mainstem mucous plugging vs mass obstruction. He has had his R sided pleural effusion drained twice due to quick reaccumulation, cytology negative x2. He underwent EBUS on 10/2 where within the R mainstem bronchus a near complete occlusion due to fungating mass was noted. Biopsy was done with final pathology revealed grade 3 neuroendocrine carcinoma consistent with small cell lung cancer.     His diagnosis, staging, and prognosis. The NCCN guidelines when discussing treatment therapy were referenced. The common side effects of chemotherapy and immunotherapy in detail including fatigue, nausea, vomiting, constipation, diarrhea, and increased risk of infections were explained. His median OS with and without treatment was discussed.  The role of palliative care as a supplement to treatment to support him and his family with the symptoms of his cancer was also discussed. They were agreeable to proceed.     October '24 PET with evidence of extensive disease to R lobe with pleural effusion and hypermetabolic LAD. No evidence of disease outside of the chest. He have since transitioned into 1st line carboplatin AUC 4, Etoposide 80mg/m2, and Atezolizumab 1200mg Q3w x 4 cycles in the palliative setting then repeat PET to assess response. Chemotherapy dose reduced due to patients  age, comorbidities, and performance status.     Interval History:   Patient presents to clinic today for NP follow up appointment and labs for toxicity check after C2 Carbo/Etop/Atezolizumab on 11/5.  Overall he is states he is doing okay today.  He has intermittent episodes of short term memory loss and confusion.  SOB remains unchanged; no worsening.  He wears home o2 intermittently  Swelling to his left lower ext remains but is improved.   Appetite stable despite changes in taste.   Discussed labs and in detail with patient and family.  Otherwise, he has no further complaints or concerns.     Past Medical History:   Diagnosis Date    Adenopathy     Anesthesia complication     takes longer to wake    Anxiety     BPH (benign prostatic hyperplasia)     COPD (chronic obstructive pulmonary disease)     Dementia     Depression     Hyperlipidemia     Hypertension     Obesity     Oxygen dependent     4L/NC    PAD (peripheral artery disease)     Post-obstructive pneumonia due to foreign body aspiration     Levaquin    Seizures     Shortness of breath     Sleep apnea     CPAP    Walker as ambulation aid       Past Surgical History:   Procedure Laterality Date    BELOW KNEE AMPUTATION OF LOWER EXTREMITY Right     BRONCHOSCOPY, WITH TRANSBRONCHIAL BIOPSY Right 10/2/2024    Procedure: BRONCHOSCOPY, WITH TRANSBRONCHIAL BIOPSY;  Surgeon: Lee Suarez Jr., MD, [facility];  Location: [facility] BRONCHOSCOPY LAB;  Service: Pulmonary;  Laterality: Right;  RUL    ENDOBRONCHIAL ULTRASOUND N/A 10/2/2024    Procedure: ENDOBRONCHIAL ULTRASOUND (EBUS)  with FLURO;  Surgeon: Lee Suarez Jr., MD, [facility];  Location: [facility] BRONCHOSCOPY LAB;  Service: Pulmonary;  Laterality: N/A;    INSERTION, STENT, BARE METAL, ARTERY, PERIPHERAL  2019    PERIPHERALLY INSERTED CENTRAL CATHETER INSERTION N/A 10/14/2024    Procedure: Insertion-picc;  Surgeon: Lejeune, Elizabeth Kennedy, MD;  Location: [facility] OR;  Service: Oncology;  Laterality: N/A;    THORACENTESIS   2024     Social History     Socioeconomic History    Marital status:    Tobacco Use    Smoking status: Former     Current packs/day: 0.00     Types: Cigarettes     Quit date: 2022     Years since quittin.7    Smokeless tobacco: Never   Substance and Sexual Activity    Alcohol use: Not Currently     Alcohol/week: 2.0 standard drinks of alcohol     Types: 2 Cans of beer per week    Drug use: Never    Sexual activity: Not Currently     Partners: Female     Birth control/protection: None     Social Drivers of Health     Financial Resource Strain: Low Risk  (10/1/2024)    Overall Financial Resource Strain (CARDIA)     Difficulty of Paying Living Expenses: Not hard at all   Food Insecurity: No Food Insecurity (10/1/2024)    Hunger Vital Sign     Worried About Running Out of Food in the Last Year: Never true     Ran Out of Food in the Last Year: Never true   Transportation Needs: No Transportation Needs (10/1/2024)    TRANSPORTATION NEEDS     Transportation : No   Stress: No Stress Concern Present (10/1/2024)    Citizen of [place] of Occupational Health - Occupational Stress Questionnaire     Feeling of Stress : Only a little   Housing Stability: Low Risk  (10/1/2024)    Housing Stability Vital Sign     Unable to Pay for Housing in the Last Year: No     Homeless in the Last Year: No      No family history on file.   Review of patient's allergies indicates:  No Known Allergies   Review of Systems   Constitutional:  Negative for activity change, appetite change, chills, fatigue, fever and unexpected weight change.   HENT:  Negative for mouth sores and sore throat.    Eyes:  Negative for visual disturbance.   Respiratory:  Negative for cough and shortness of breath.    Cardiovascular:  Negative for chest pain.   Gastrointestinal:  Negative for abdominal pain, constipation, diarrhea, nausea and vomiting.   Endocrine: Negative for polyuria.   Genitourinary:  Negative for dysuria and frequency.    Musculoskeletal:  Negative for back pain.   Integumentary:  Negative for rash.   Allergic/Immunologic: Negative for frequent infections.   Neurological:  Negative for weakness and headaches.   Hematological:  Negative for adenopathy. Does not bruise/bleed easily.   Psychiatric/Behavioral:  The patient is nervous/anxious.          Objective:      Vitals:    24 0918   BP: 104/61   Pulse: 71   Resp: 18   Temp: 97.7 °F (36.5 °C)       Physical Exam  Constitutional:       General: He is not in acute distress.     Appearance: Normal appearance. He is ill-appearing.   HENT:      Head: Normocephalic and atraumatic.      Nose: Nose normal.      Mouth/Throat:      Mouth: Mucous membranes are moist.      Pharynx: Oropharynx is clear.   Eyes:      Extraocular Movements: Extraocular movements intact.      Conjunctiva/sclera: Conjunctivae normal.      Pupils: Pupils are equal, round, and reactive to light.   Cardiovascular:      Rate and Rhythm: Normal rate and regular rhythm.      Pulses: Normal pulses.      Heart sounds: Normal heart sounds. No murmur heard.  Pulmonary:      Effort: Pulmonary effort is normal. No respiratory distress.      Comments: 3L NC  Coarse minimal BS on R side  Abdominal:      General: There is no distension.      Palpations: Abdomen is soft.      Tenderness: There is no abdominal tenderness.   Musculoskeletal:         General: Deformity (R BKA) present. Normal range of motion.      Cervical back: Normal range of motion and neck supple.      Right lower leg: No edema.      Left lower leg: No edema.   Lymphadenopathy:      Cervical: No cervical adenopathy.   Skin:     General: Skin is warm and dry.   Neurological:      General: No focal deficit present.      Mental Status: He is alert and oriented to person, place, and time.         LABS AND IMAGING REVIEWED IN EPIC    IMAGIN24 MRI Brain:  Impression:  1.  No acute intra findings or metastatic evidence.  2.  Chronic microangiopathic  ischemia and atrophy.    9/20/24 CT Chest:  IMPRESSION  1. Right near obstructive mucous plugging versus endobronchial lesion with almost complete consolidative collapse of the right lung.  Underlying malignancy is of concern given associated mediastinal adenopathy.  Superimposed infectious process not excluded.  2. Incidental nonobstructing right nephrolithiasis.  3. Additional chronic secondary findings discussed above.    10/9/24 PET/CT:  Impression:  Large conglomerate right hilar/perihilar mass with moderate FDG avidity.  There are postobstructive changes.  Small right pleural effusion with FDG avid pleural nodule dependently at the right hemithorax.  Hypermetabolic right hilar, mediastinal and right supraclavicular lymph nodes.      PATHOLOGY:  10/2/24 EBUS:  FINAL DIAGNOSIS   1. LYMPH NODE 4R, EBUS-GUIDED BIOPSY:   METASTATIC SMALL CELL CARCINOMA (HIGH GRADE NEUROENDOCRINE CARCINOMA).   IMMUNOHISTOCHEMICAL STAINS PERFORMED WITH ADEQUATE CONTROLS:   KI-67, LOW MOLECULAR WEIGHT CYTOKERATIN, CD56, SYNAPTOPHYSIN, TTF-1 AND   CHROMOGRANIN:  INCONCLUSIVE DUE TO EXTENSIVE NECROSIS.      2. RIGHT LUNG, UPPER LOBE, BIOPSY:   HIGH GRADE NEUROENDOCRINE CARCINOMA (SMALL CELL CARCINOMA).   IMMUNOHISTOCHEMICAL STAINS PERFORMED WITH ADEQUATE CONTROLS:   CD56, LOW MOLECULAR WEIGHT CYTOKERATIN, SYNAPTOPHYSIN, CHROMOGRANIN AND TTF-1:   STRONGLY POSITIVE IN MALIGNANT CELLS.   KI-67:  SHOWS NEAR 100% NUCLEAR POSITIVITY IN MALIGNANT CELLS.     Assessment:   Extensive Stage R Lung SCLC - with recurrent pleural effusions and near obstructing R mainstem mass. Would be a candidate for palliative chemotherapy and would consider palliative radiation given the near occlusion of his mainstem if no immediate response.     His diagnosis, stage, and overall prognosis was discussed. We discussed the goals with therapy and without and that the goals of treatment were palliative. He does wish to proceed.     Plan:   -Toxicity reviewed; will  hold C2D1-3 of Carbo/Etop/Atezolizumab on 11/5-7 given Neutropenic. . Dose already reduce Carboplatin to AUC 4 and Etoposide to 80mg/m2 due to patient tolerance concerns.  - Neutropenic precaution education provided  - Plan for C2 in 1 week  - RTC on 11/11 for NP visit, labs same day - CBC, CMP, TSH    Call Daughter with all appts/information    I spent a total of 40 minutes on the day of the visit.This includes face to face time and non-face to face time preparing to see the patient (eg, review of tests), obtaining and/or reviewing separately obtained history, documenting clinical information in the electronic or other health record, independently interpreting results and communicating results to the patient/family/caregiver, or care coordinator.      CLEMENTINA Don-ALBERTO  Hematology/Oncology   Can[facility] Center [facility]

## 2024-11-08 ENCOUNTER — INFUSION (OUTPATIENT)
Dept: INFUSION THERAPY | Facility: HOSPITAL | Age: 79
End: 2024-11-08
Attending: STUDENT IN AN ORGANIZED HEALTH CARE EDUCATION/TRAINING PROGRAM
Payer: MEDICARE

## 2024-11-08 VITALS
TEMPERATURE: 98 F | SYSTOLIC BLOOD PRESSURE: 115 MMHG | OXYGEN SATURATION: 92 % | DIASTOLIC BLOOD PRESSURE: 67 MMHG | HEART RATE: 72 BPM

## 2024-11-08 DIAGNOSIS — C7A.1 SMALL CELL NEUROENDOCRINE CARCINOMA OF LUNG: Primary | ICD-10-CM

## 2024-11-08 PROCEDURE — 63600175 PHARM REV CODE 636 W HCPCS: Performed by: STUDENT IN AN ORGANIZED HEALTH CARE EDUCATION/TRAINING PROGRAM

## 2024-11-08 PROCEDURE — 25000003 PHARM REV CODE 250: Performed by: STUDENT IN AN ORGANIZED HEALTH CARE EDUCATION/TRAINING PROGRAM

## 2024-11-08 PROCEDURE — 96523 IRRIG DRUG DELIVERY DEVICE: CPT

## 2024-11-08 PROCEDURE — A4216 STERILE WATER/SALINE, 10 ML: HCPCS | Performed by: STUDENT IN AN ORGANIZED HEALTH CARE EDUCATION/TRAINING PROGRAM

## 2024-11-08 RX ORDER — HEPARIN 100 UNIT/ML
500 SYRINGE INTRAVENOUS
OUTPATIENT
Start: 2024-11-08

## 2024-11-08 RX ORDER — HEPARIN 100 UNIT/ML
500 SYRINGE INTRAVENOUS
Status: DISCONTINUED | OUTPATIENT
Start: 2024-11-08 | End: 2024-11-08 | Stop reason: HOSPADM

## 2024-11-08 RX ORDER — SODIUM CHLORIDE 0.9 % (FLUSH) 0.9 %
10 SYRINGE (ML) INJECTION
Status: COMPLETED | OUTPATIENT
Start: 2024-11-08 | End: 2024-11-08

## 2024-11-08 RX ORDER — SODIUM CHLORIDE 0.9 % (FLUSH) 0.9 %
10 SYRINGE (ML) INJECTION
OUTPATIENT
Start: 2024-11-08

## 2024-11-08 RX ORDER — HEPARIN 100 UNIT/ML
500 SYRINGE INTRAVENOUS
Status: COMPLETED | OUTPATIENT
Start: 2024-11-08 | End: 2024-11-08

## 2024-11-08 RX ADMIN — SODIUM CHLORIDE, PRESERVATIVE FREE 10 ML: 5 INJECTION INTRAVENOUS at 10:11

## 2024-11-08 RX ADMIN — HEPARIN 500 UNITS: 100 SYRINGE at 10:11

## 2024-11-11 ENCOUNTER — OFFICE VISIT (OUTPATIENT)
Dept: HEMATOLOGY/ONCOLOGY | Facility: CLINIC | Age: 79
End: 2024-11-11
Payer: MEDICARE

## 2024-11-11 ENCOUNTER — HOSPITAL ENCOUNTER (OUTPATIENT)
Dept: RADIOLOGY | Facility: HOSPITAL | Age: 79
Discharge: HOME | End: 2024-11-11
Payer: MEDICARE

## 2024-11-11 VITALS
TEMPERATURE: 98 F | OXYGEN SATURATION: 89 % | BODY MASS INDEX: 31.52 KG/M2 | SYSTOLIC BLOOD PRESSURE: 117 MMHG | WEIGHT: 177.88 LBS | HEIGHT: 63 IN | DIASTOLIC BLOOD PRESSURE: 68 MMHG | RESPIRATION RATE: 18 BRPM | HEART RATE: 69 BPM

## 2024-11-11 DIAGNOSIS — Z79.899 LONG-TERM CURRENT USE OF HIGH RISK MEDICATION OTHER THAN ANTICOAGULANT: ICD-10-CM

## 2024-11-11 DIAGNOSIS — R06.02 SOB (SHORTNESS OF BREATH): ICD-10-CM

## 2024-11-11 DIAGNOSIS — C7A.1 SMALL CELL NEUROENDOCRINE CARCINOMA OF LUNG: Primary | ICD-10-CM

## 2024-11-11 PROCEDURE — 99215 OFFICE O/P EST HI 40 MIN: CPT | Mod: S$PBB,,,

## 2024-11-11 PROCEDURE — 99999 PR PBB SHADOW E&M-EST. PATIENT-LVL V: CPT | Mod: PBBFAC,,,

## 2024-11-11 PROCEDURE — 71046 X-RAY EXAM CHEST 2 VIEWS: CPT | Mod: TC

## 2024-11-11 PROCEDURE — 99215 OFFICE O/P EST HI 40 MIN: CPT | Mod: PBBFAC,25

## 2024-11-11 RX ORDER — DIPHENHYDRAMINE HYDROCHLORIDE 50 MG/ML
50 INJECTION INTRAMUSCULAR; INTRAVENOUS ONCE AS NEEDED
Status: CANCELLED | OUTPATIENT
Start: 2024-11-12

## 2024-11-11 RX ORDER — PROCHLORPERAZINE EDISYLATE 5 MG/ML
5 INJECTION INTRAMUSCULAR; INTRAVENOUS ONCE AS NEEDED
Status: CANCELLED | OUTPATIENT
Start: 2024-11-14

## 2024-11-11 RX ORDER — EPINEPHRINE 0.3 MG/.3ML
0.3 INJECTION SUBCUTANEOUS ONCE AS NEEDED
Status: CANCELLED | OUTPATIENT
Start: 2024-11-12

## 2024-11-11 RX ORDER — DIPHENHYDRAMINE HYDROCHLORIDE 50 MG/ML
50 INJECTION INTRAMUSCULAR; INTRAVENOUS ONCE AS NEEDED
Status: CANCELLED | OUTPATIENT
Start: 2024-11-14

## 2024-11-11 RX ORDER — EPINEPHRINE 0.3 MG/.3ML
0.3 INJECTION SUBCUTANEOUS ONCE AS NEEDED
Status: CANCELLED | OUTPATIENT
Start: 2024-11-14

## 2024-11-11 RX ORDER — DIPHENHYDRAMINE HYDROCHLORIDE 50 MG/ML
50 INJECTION INTRAMUSCULAR; INTRAVENOUS ONCE AS NEEDED
Status: CANCELLED | OUTPATIENT
Start: 2024-11-13

## 2024-11-11 RX ORDER — HEPARIN 100 UNIT/ML
500 SYRINGE INTRAVENOUS
Status: CANCELLED | OUTPATIENT
Start: 2024-11-14

## 2024-11-11 RX ORDER — SODIUM CHLORIDE 0.9 % (FLUSH) 0.9 %
10 SYRINGE (ML) INJECTION
Status: CANCELLED | OUTPATIENT
Start: 2024-11-14

## 2024-11-11 RX ORDER — HEPARIN 100 UNIT/ML
500 SYRINGE INTRAVENOUS
Status: CANCELLED | OUTPATIENT
Start: 2024-11-12

## 2024-11-11 RX ORDER — EPINEPHRINE 0.3 MG/.3ML
0.3 INJECTION SUBCUTANEOUS ONCE AS NEEDED
Status: CANCELLED | OUTPATIENT
Start: 2024-11-13

## 2024-11-11 RX ORDER — SODIUM CHLORIDE 0.9 % (FLUSH) 0.9 %
10 SYRINGE (ML) INJECTION
Status: CANCELLED | OUTPATIENT
Start: 2024-11-12

## 2024-11-11 RX ORDER — SODIUM CHLORIDE 0.9 % (FLUSH) 0.9 %
10 SYRINGE (ML) INJECTION
Status: CANCELLED | OUTPATIENT
Start: 2024-11-13

## 2024-11-11 RX ORDER — PROCHLORPERAZINE EDISYLATE 5 MG/ML
5 INJECTION INTRAMUSCULAR; INTRAVENOUS ONCE AS NEEDED
Status: CANCELLED | OUTPATIENT
Start: 2024-11-13

## 2024-11-11 RX ORDER — PROCHLORPERAZINE EDISYLATE 5 MG/ML
5 INJECTION INTRAMUSCULAR; INTRAVENOUS ONCE AS NEEDED
Status: CANCELLED | OUTPATIENT
Start: 2024-11-12

## 2024-11-11 RX ORDER — HEPARIN 100 UNIT/ML
500 SYRINGE INTRAVENOUS
Status: CANCELLED | OUTPATIENT
Start: 2024-11-13

## 2024-11-11 NOTE — PROGRESS NOTES
Subjective:       Patient ID: Gerald J Lejeune is a 79 y.o. male.    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 He has a 150+ pack year smoking history, quit 1 year prior to presentation, on 3-4L NC at baseline. He presented to [facility] in September '24 for new onset seizures. Workup was done which was unrevealing of etiology, but during that workup he was found to have large volume R pleural effusion, large mediastinal LAD, and R mainstem mucous plugging vs mass obstruction. He has had his R sided pleural effusion drained twice due to quick reaccumulation, cytology negative x2. He underwent EBUS on 10/2 where within the R mainstem bronchus a near complete occlusion due to fungating mass was noted. Biopsy was done with final pathology revealed grade 3 neuroendocrine carcinoma consistent with small cell lung cancer.     His diagnosis, staging, and prognosis. The NCCN guidelines when discussing treatment therapy were referenced. The common side effects of chemotherapy and immunotherapy in detail including fatigue, nausea, vomiting, constipation, diarrhea, and increased risk of infections were explained. His median OS with and without treatment was discussed.  The role of palliative care as a supplement to treatment to support him and his family with the symptoms of his cancer was also discussed. They were agreeable to proceed.     October '24 PET with evidence of extensive disease to R lobe with pleural effusion and hypermetabolic LAD. No evidence of disease outside of the chest. He have since transitioned into 1st line carboplatin AUC 4, Etoposide 80mg/m2, and Atezolizumab 1200mg Q3w x 4 cycles in the palliative setting then repeat PET to assess response. Chemotherapy dose reduced due to patients  age, comorbidities, and performance status.     Interval History:   Patient presents to clinic today for NP follow up appointment and labs for toxicity check after C2 Carbo/Etop/Atezolizumab on 11/5.  C2 delayed secondary to neutropenia  Overall he is states he is doing okay today.  Reports SOB on exertion. His family expresses concern a reoccurrence of fluid to his lungs.  He have edema to left lower ext; not worsening.   Appetite stable despite changes in taste.   Discussed labs and in detail with patient and family.  Otherwise, he has no further complaints or concerns.     Past Medical History:   Diagnosis Date    Adenopathy     Anesthesia complication     takes longer to wake    Anxiety     BPH (benign prostatic hyperplasia)     COPD (chronic obstructive pulmonary disease)     Dementia     Depression     Hyperlipidemia     Hypertension     Obesity     Oxygen dependent     4L/NC    PAD (peripheral artery disease)     Post-obstructive pneumonia due to foreign body aspiration     Levaquin    Seizures     Shortness of breath     Sleep apnea     CPAP    Walker as ambulation aid       Past Surgical History:   Procedure Laterality Date    BELOW KNEE AMPUTATION OF LOWER EXTREMITY Right     BRONCHOSCOPY, WITH TRANSBRONCHIAL BIOPSY Right 10/2/2024    Procedure: BRONCHOSCOPY, WITH TRANSBRONCHIAL BIOPSY;  Surgeon: Lee Suarez Jr., MD, [facility];  Location: [facility] BRONCHOSCOPY LAB;  Service: Pulmonary;  Laterality: Right;  RUL    ENDOBRONCHIAL ULTRASOUND N/A 10/2/2024    Procedure: ENDOBRONCHIAL ULTRASOUND (EBUS)  with FLURO;  Surgeon: Lee Suarez Jr., MD, [facility];  Location: [facility] BRONCHOSCOPY LAB;  Service: Pulmonary;  Laterality: N/A;    INSERTION, STENT, BARE METAL, ARTERY, PERIPHERAL  2019    PERIPHERALLY INSERTED CENTRAL CATHETER INSERTION N/A 10/14/2024    Procedure: Insertion-picc;  Surgeon: Lejeune, Elizabeth Kennedy, MD;  Location: [facility] OR;  Service: Oncology;  Laterality: N/A;    THORACENTESIS  09/21/2024     Social  History     Socioeconomic History    Marital status:    Tobacco Use    Smoking status: Former     Current packs/day: 0.00     Types: Cigarettes     Quit date: 2022     Years since quittin.7    Smokeless tobacco: Never   Substance and Sexual Activity    Alcohol use: Not Currently     Alcohol/week: 2.0 standard drinks of alcohol     Types: 2 Cans of beer per week    Drug use: Never    Sexual activity: Not Currently     Partners: Female     Birth control/protection: None     Social Drivers of Health     Financial Resource Strain: Low Risk  (11/10/2024)    Overall Financial Resource Strain (CARDIA)     Difficulty of Paying Living Expenses: Not hard at all   Food Insecurity: No Food Insecurity (11/10/2024)    Hunger Vital Sign     Worried About Running Out of Food in the Last Year: Never true     Ran Out of Food in the Last Year: Never true   Transportation Needs: No Transportation Needs (10/1/2024)    TRANSPORTATION NEEDS     Transportation : No   Physical Activity: Inactive (11/10/2024)    Exercise Vital Sign     Days of Exercise per Week: 0 days     Minutes of Exercise per Session: 10 min   Stress: No Stress Concern Present (11/10/2024)    [place] of Occupational Health - Occupational Stress Questionnaire     Feeling of Stress : Only a little   Housing Stability: Low Risk  (11/10/2024)    Housing Stability Vital Sign     Unable to Pay for Housing in the Last Year: No     Homeless in the Last Year: No      No family history on file.   Review of patient's allergies indicates:  No Known Allergies   Review of Systems   Constitutional:  Negative for activity change, appetite change, chills, fatigue, fever and unexpected weight change.   HENT:  Negative for mouth sores and sore throat.    Eyes:  Negative for visual disturbance.   Respiratory:  Negative for cough and shortness of breath.    Cardiovascular:  Negative for chest pain.   Gastrointestinal:  Negative for abdominal pain, constipation,  diarrhea, nausea and vomiting.   Endocrine: Negative for polyuria.   Genitourinary:  Negative for dysuria and frequency.   Musculoskeletal:  Negative for back pain.   Integumentary:  Negative for rash.   Allergic/Immunologic: Negative for frequent infections.   Neurological:  Negative for weakness and headaches.   Hematological:  Negative for adenopathy. Does not bruise/bleed easily.   Psychiatric/Behavioral:  The patient is nervous/anxious.          Objective:      Vitals:    11/11/24 1055   BP: 117/68   Pulse: 69   Resp: 18   Temp: 97.7 °F (36.5 °C)       Physical Exam  Constitutional:       General: He is not in acute distress.     Appearance: Normal appearance. He is ill-appearing.   HENT:      Head: Normocephalic and atraumatic.      Nose: Nose normal.      Mouth/Throat:      Mouth: Mucous membranes are moist.      Pharynx: Oropharynx is clear.   Eyes:      Extraocular Movements: Extraocular movements intact.      Conjunctiva/sclera: Conjunctivae normal.      Pupils: Pupils are equal, round, and reactive to light.   Cardiovascular:      Rate and Rhythm: Normal rate and regular rhythm.      Pulses: Normal pulses.      Heart sounds: Normal heart sounds. No murmur heard.  Pulmonary:      Effort: Pulmonary effort is normal. No respiratory distress.      Comments: 3L NC  Coarse minimal BS on R side  Abdominal:      General: There is no distension.      Palpations: Abdomen is soft.      Tenderness: There is no abdominal tenderness.   Musculoskeletal:         General: Deformity (R BKA) present. Normal range of motion.      Cervical back: Normal range of motion and neck supple.      Right lower leg: No edema.      Left lower leg: No edema.   Lymphadenopathy:      Cervical: No cervical adenopathy.   Skin:     General: Skin is warm and dry.   Neurological:      General: No focal deficit present.      Mental Status: He is alert and oriented to person, place, and time.       LABS AND IMAGING REVIEWED IN  EPIC    IMAGIN24 MRI Brain:  Impression:  1.  No acute intra findings or metastatic evidence.  2.  Chronic microangiopathic ischemia and atrophy.    24 CT Chest:  IMPRESSION  1. Right near obstructive mucous plugging versus endobronchial lesion with almost complete consolidative collapse of the right lung.  Underlying malignancy is of concern given associated mediastinal adenopathy.  Superimposed infectious process not excluded.  2. Incidental nonobstructing right nephrolithiasis.  3. Additional chronic secondary findings discussed above.    10/9/24 PET/CT:  Impression:  Large conglomerate right hilar/perihilar mass with moderate FDG avidity.  There are postobstructive changes.  Small right pleural effusion with FDG avid pleural nodule dependently at the right hemithorax.  Hypermetabolic right hilar, mediastinal and right supraclavicular lymph nodes.      PATHOLOGY:  10/2/24 EBUS:  FINAL DIAGNOSIS   1. LYMPH NODE 4R, EBUS-GUIDED BIOPSY:   METASTATIC SMALL CELL CARCINOMA (HIGH GRADE NEUROENDOCRINE CARCINOMA).   IMMUNOHISTOCHEMICAL STAINS PERFORMED WITH ADEQUATE CONTROLS:   KI-67, LOW MOLECULAR WEIGHT CYTOKERATIN, CD56, SYNAPTOPHYSIN, TTF-1 AND   CHROMOGRANIN:  INCONCLUSIVE DUE TO EXTENSIVE NECROSIS.      2. RIGHT LUNG, UPPER LOBE, BIOPSY:   HIGH GRADE NEUROENDOCRINE CARCINOMA (SMALL CELL CARCINOMA).   IMMUNOHISTOCHEMICAL STAINS PERFORMED WITH ADEQUATE CONTROLS:   CD56, LOW MOLECULAR WEIGHT CYTOKERATIN, SYNAPTOPHYSIN, CHROMOGRANIN AND TTF-1:   STRONGLY POSITIVE IN MALIGNANT CELLS.   KI-67:  SHOWS NEAR 100% NUCLEAR POSITIVITY IN MALIGNANT CELLS.     Assessment:   Extensive Stage R Lung SCLC - with recurrent pleural effusions and near obstructing R mainstem mass. Would be a candidate for palliative chemotherapy and would consider palliative radiation given the near occlusion of his mainstem if no immediate response.     His diagnosis, stage, and overall prognosis was discussed. We discussed the goals with  therapy and without and that the goals of treatment were palliative. He does wish to proceed.     Plan:   -Toxicity reviewed; will proceed with C2D1-3 of Carbo/Etop/Atezolizumab on 11/12-11-14 given recent Neutropenia. Will add growth factor to D3 for this cycle going forward. Dose already reduce Carboplatin to AUC 4 and Etoposide to 80mg/m2 due to patient tolerance concerns.  - Please wear compression socks  - CXR today  - RTC on 12/1 for MD visit, labs same day - CBC, CMP, TSH    Call Daughter with all appts/information    I spent a total of 40 minutes on the day of the visit.This includes face to face time and non-face to face time preparing to see the patient (eg, review of tests), obtaining and/or reviewing separately obtained history, documenting clinical information in the electronic or other health record, independently interpreting results and communicating results to the patient/family/caregiver, or care coordinator.      CLEMENTINA Don-ALBERTO  Hematology/Oncology   Cancer Center [facility]

## 2024-11-12 ENCOUNTER — INFUSION (OUTPATIENT)
Dept: INFUSION THERAPY | Facility: HOSPITAL | Age: 79
End: 2024-11-12
Attending: STUDENT IN AN ORGANIZED HEALTH CARE EDUCATION/TRAINING PROGRAM
Payer: MEDICARE

## 2024-11-12 VITALS
OXYGEN SATURATION: 94 % | HEART RATE: 72 BPM | RESPIRATION RATE: 18 BRPM | TEMPERATURE: 98 F | BODY MASS INDEX: 31.52 KG/M2 | SYSTOLIC BLOOD PRESSURE: 116 MMHG | DIASTOLIC BLOOD PRESSURE: 73 MMHG | WEIGHT: 177.88 LBS | HEIGHT: 63 IN

## 2024-11-12 DIAGNOSIS — C7A.1 SMALL CELL NEUROENDOCRINE CARCINOMA OF LUNG: Primary | ICD-10-CM

## 2024-11-12 PROCEDURE — 25000003 PHARM REV CODE 250

## 2024-11-12 PROCEDURE — 96375 TX/PRO/DX INJ NEW DRUG ADDON: CPT

## 2024-11-12 PROCEDURE — A4216 STERILE WATER/SALINE, 10 ML: HCPCS

## 2024-11-12 PROCEDURE — 96413 CHEMO IV INFUSION 1 HR: CPT

## 2024-11-12 PROCEDURE — 96417 CHEMO IV INFUS EACH ADDL SEQ: CPT

## 2024-11-12 PROCEDURE — 63600175 PHARM REV CODE 636 W HCPCS

## 2024-11-12 RX ORDER — EPINEPHRINE 0.3 MG/.3ML
0.3 INJECTION SUBCUTANEOUS ONCE AS NEEDED
Status: DISCONTINUED | OUTPATIENT
Start: 2024-11-12 | End: 2024-11-12 | Stop reason: HOSPADM

## 2024-11-12 RX ORDER — PROCHLORPERAZINE EDISYLATE 5 MG/ML
5 INJECTION INTRAMUSCULAR; INTRAVENOUS ONCE AS NEEDED
Status: DISCONTINUED | OUTPATIENT
Start: 2024-11-12 | End: 2024-11-12 | Stop reason: HOSPADM

## 2024-11-12 RX ORDER — HEPARIN 100 UNIT/ML
500 SYRINGE INTRAVENOUS
Status: DISCONTINUED | OUTPATIENT
Start: 2024-11-12 | End: 2024-11-12 | Stop reason: HOSPADM

## 2024-11-12 RX ORDER — SODIUM CHLORIDE 0.9 % (FLUSH) 0.9 %
10 SYRINGE (ML) INJECTION
Status: DISCONTINUED | OUTPATIENT
Start: 2024-11-12 | End: 2024-11-12 | Stop reason: HOSPADM

## 2024-11-12 RX ORDER — DIPHENHYDRAMINE HYDROCHLORIDE 50 MG/ML
50 INJECTION INTRAMUSCULAR; INTRAVENOUS ONCE AS NEEDED
Status: DISCONTINUED | OUTPATIENT
Start: 2024-11-12 | End: 2024-11-12 | Stop reason: HOSPADM

## 2024-11-12 RX ADMIN — ETOPOSIDE 152 MG: 20 INJECTION INTRAVENOUS at 10:11

## 2024-11-12 RX ADMIN — SODIUM CHLORIDE, PRESERVATIVE FREE 10 ML: 5 INJECTION INTRAVENOUS at 11:11

## 2024-11-12 RX ADMIN — SODIUM CHLORIDE 0.25 MG: 9 INJECTION, SOLUTION INTRAVENOUS at 09:11

## 2024-11-12 RX ADMIN — ATEZOLIZUMAB 1200 MG: 1200 INJECTION, SOLUTION INTRAVENOUS at 09:11

## 2024-11-12 RX ADMIN — SODIUM CHLORIDE: 9 INJECTION, SOLUTION INTRAVENOUS at 08:11

## 2024-11-12 RX ADMIN — APREPITANT 130 MG: 130 INJECTION, EMULSION INTRAVENOUS at 09:11

## 2024-11-12 RX ADMIN — HEPARIN 500 UNITS: 100 SYRINGE at 11:11

## 2024-11-12 RX ADMIN — CARBOPLATIN 555 MG: 10 INJECTION, SOLUTION INTRAVENOUS at 10:11

## 2024-11-12 NOTE — PLAN OF CARE
Plan of care reviewed with patient/SO; both in agreement. C2D1 completed. Appts reviewed and printed for patient.

## 2024-11-12 NOTE — PROGRESS NOTES
Palliative Clinic    Patient ID: [id number]     Chief Complaint: Establish Care      HPI:     Gerald J Lejeune is a 79 y.o. male here today for the purpose of establishing care with palliative medicine clinic.  He has a PMHx of  Obesity, PAD s/p right BKA in 2019, ADAM, Chronic hypoxic respiratory failure and COPD. He was hospitalized 9/19-9/24/24 for new onset seizures. He was found to have a large right pleural effusion,  large mediastinal LAD, and R mainstem mucous plugging vs mass obstruction. He has had his R sided pleural effusion drained twice due to quick reaccumulation, cytology negative x2.  He was admitted on 10/1-10/4/24 for right sided large pleural effusion s/p thoracentesis on 10/2/24. 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 He underwent right sided thoracentesis on 10/18/24.      In October he underwent PET-CT scan with evidence of extensive disease to right lobe with pleural effusion and hypermetabolic LAD.  Of disease outside of the chest.  He was started on first-line carboplatin AUC 4, Etoposide 80mg/m2, and Atezolizumab 1200mg Q3w x 4 cycles in the palliative setting then repeat PET to assess response. Chemotherapy dose reduced due to patients age, comorbidities, and performance status. He began C2 of treatment on 11/12/24 with added growth factor due to recent neutropenia.    Symptom review.    Pain. Per , patient appears to have received Norco 10 mg tablets #80 monthly for an extended period of time.  Azul verifies that patient experienced pain in the right lower extremity after the patient which occurred 5 days ago secondary to severe peripheral vascular disease.  Since that time he has required this medication monthly.   Does not take it typically more than 2 pills per day according to family.  He has not required greater frequency or have required any dosing adjustments since the start of this medication.  He also has some pain in the neck intermittently which he believed with massage and his current pain medication.  This was more recent onset was not described as severe.  It occurs intermittently. Patient will continue to follow up with PCP to fill Norco 10 mg for now.    Rash. He developed a red, raised rash over his abdomen after the most recent chemo treatment. It is pruritic. He used an OTC hydrocortisone cream which was somewhat beneficial for the symptoms.    LE edema. No related symptoms. Previously treated by cardiologist with lasix. He is trying to get an appt at present.    Denies anxiety, constipation, nausea, shortness of breath greater than baseline.  He has had some intermittent confusion difficulty with short-term memory.  His daughter feels that this is primarily related to cancer and worsened with recent chemotherapy.  Memory changes have been present since April of 2024.      Advance Care Planning     Date: 11/12/2024    Code Status  In light of the patients advanced and life limiting illness,I engaged the the patient and family in a voluntary conversation about the patient's preferences for care  at the very end of life. The patient wishes to have a natural, peaceful death.  Along those lines, the patient does not wish to have CPR or other invasive treatments performed when his heart and/or breathing stops. I communicated to the patient and family that a DNR order would be placed in his medical record to reflect this preference and LaPOST form was completed to reflect other EOL preferences of the patient such as A. DNR, B. Selective treatment options, C. No artificial nutrition by tube.    A total of 20 min was spent on advance care planning, goals of care discussion, emotional support, formulating and  communicating prognosis and exploring burden/benefit of various approaches of treatment. This discussion occurred on a fully voluntary basis with the verbal consent of the patient and/or family.     [facility]  I engaged the patient and family in a voluntary conversation about advance care planning and we specifically addressed what the goals of care would be moving forward, in light of the patient's change in clinical status, specifically recent diagnosis with metastatic lung cancer.  The patient verifies the goal to continue with aggressive treatment measures including chemoimmunotherapy.  I explained the purpose of palliative care outpatient to address symptom management involving his current illness and the treatment to ensure his ability to improve quality of life and continue with goal of aggressive treatment for his cancer.  I reviewed the future option of hospice care should his condition decline and he either is no longer a candidate for aggressive treatment elects against such treatment.    Hospice review:     I reviewed the benefits of home hospice care, including aggressive symptom-based management with the intent to avoid future hospitalizations which may increase increase the risk of having a negative effect on her quality of life. I reviewed the benefit of regular visits by an assigned RN and 24/7 on call RN to address uncontrolled symptoms which may precipitate into a symptom crisis. This may prevent unwanted ER visits or hospitalizations ordinarily necessary to manage symptoms of an advanced illness. I also reviewed benefit of regular CNA visits and the option of  and  visitations. I explained that all medications related to her diagnosis and symptom related medications would be covered by hospice, including oxygen, a hospital bed and other durable medical equipment.     The family understands that this patient has a illness which is treatable but not curable.  The option of hospice in  the future should the patient's goals change his condition should decline.   We explored the patient's values and preferences for future care.  The patient and family endorses that what is most important right now is to focus on spending time at home, avoiding the hospital, remaining as independent as possible, symptom/pain control, quality of life, even if it means sacrificing a little time, improvement in condition but with limits to invasive therapies, and comfort and QOL     Accordingly, we have decided that the best plan to meet the patient's goals includes continuing with treatment    A total of 30 min was spent on advance care planning, goals of care discussion, emotional support, formulating and communicating prognosis and exploring burden/benefit of various approaches of treatment. This discussion occurred on a fully voluntary basis with the verbal consent of the patient and/or family.     Hospice  I did explain the role for hospice care at this stage of the patient's illness, including its ability to help the patient live with the best quality of life possible.  We will notbe making a hospice referral.        Advance Directives:   LaPOST: Yes    Do Not Resuscitate Status: Yes    Medical Power of : Yes      Decision Making:  Patient answered questions and Family answered questions  Goals of Care: What is most important right now is to focus on curative/life-prolongation (regardless of treatment burdens). Accordingly, we have decided that the best plan to meet the patient's goals includes continuing with treatment.          Artifical nutrition:  I reviewed the patient's wishes concerning the option for or against a future placement of a PEG for the purpose of long term artificial nutrition. I reviewed the benefits and risks. At this time the patient elects against PEG placement in such an event.    Assessment/Plan:       1. Counseling regarding advance care planning and goals of care  See above  discussion    2. Dermatitis due to drugs or medicines  Add triamcinolone 0.1% ointment, apply BID to effected area of abdomen x 14 days, prn rash. Can't rule out a tinea. If no improvement, will consider other treatment options.    3. Memory impairment  Discussed possible unmasked, undiagnosed dementia, worsened with new diagnoses and treatment. Family will monitor.    4. Small cell neuroendocrine carcinoma of lung  Follow up with oncology  -     Ambulatory referral/consult to CLINIC Palliative Care    Other orders  -     triamcinolone acetonide 0.1% (KENALOG) 0.1 % ointment; Apply topically 2 (two) times daily. Apply small amount to effective area of rash x 2 weeks prn  Dispense: 90 g; Refill: 0                 History:     Past Medical History:   Diagnosis Date    Adenopathy     Anesthesia complication     takes longer to wake    Anxiety     BPH (benign prostatic hyperplasia)     COPD (chronic obstructive pulmonary disease)     Dementia     Depression     Hyperlipidemia     Hypertension     Obesity     Oxygen dependent     4L/NC    PAD (peripheral artery disease)     Post-obstructive pneumonia due to foreign body aspiration     Levaquin    Seizures     Shortness of breath     Sleep apnea     CPAP    Walker as ambulation aid         Past Surgical History:   Procedure Laterality Date    BELOW KNEE AMPUTATION OF LOWER EXTREMITY Right     BRONCHOSCOPY, WITH TRANSBRONCHIAL BIOPSY Right 10/2/2024    Procedure: BRONCHOSCOPY, WITH TRANSBRONCHIAL BIOPSY;  Surgeon: Lee Suarez Jr., MD, [facility];  Location: [facility] BRONCHOSCOPY LAB;  Service: Pulmonary;  Laterality: Right;  RUL    ENDOBRONCHIAL ULTRASOUND N/A 10/2/2024    Procedure: ENDOBRONCHIAL ULTRASOUND (EBUS)  with FLURO;  Surgeon: Lee Suarez Jr., MD, [facility];  Location: [facility] BRONCHOSCOPY LAB;  Service: Pulmonary;  Laterality: N/A;    INSERTION, STENT, BARE METAL, ARTERY, PERIPHERAL  2019    PERIPHERALLY INSERTED CENTRAL CATHETER INSERTION N/A 10/14/2024    Procedure:  Insertion-picc;  Surgeon: Lejeune, Elizabeth Kennedy, MD;  Location: [facility] OR;  Service: Oncology;  Laterality: N/A;    THORACENTESIS  2024        Social History     Tobacco Use    Smoking status: Former     Current packs/day: 0.00     Types: Cigarettes     Quit date: 2022     Years since quittin.7    Smokeless tobacco: Never   Substance and Sexual Activity    Alcohol use: Not Currently     Alcohol/week: 2.0 standard drinks of alcohol     Types: 2 Cans of beer per week    Drug use: Never    Sexual activity: Not Currently     Partners: Female     Birth control/protection: None        Current Outpatient Medications   Medication Instructions    clopidogreL (PLAVIX) 75 mg, Daily    dexAMETHasone (DECADRON) 8 mg, Oral, Daily, on days 2-4 of each chemotherapy cycle    diltiaZEM (CARDIZEM) 120 MG tablet 1 tablet, Daily    EScitalopram oxalate (LEXAPRO) 5 mg, Daily    fluticasone propionate (FLONASE) 50 mcg/actuation nasal spray 1 spray, Daily    furosemide (LASIX) 20 mg, Oral, Daily    HYDROcodone-acetaminophen (NORCO)  mg per tablet 1 tablet, Every 6 hours PRN    ipratropium (ATROVENT) 42 mcg (0.06 %) nasal spray 2 sprays, 3 times daily    levETIRAcetam (KEPPRA) 500 mg, Oral, 2 times daily    memantine (NAMENDA) 10 mg, 2 times daily    OLANZapine (ZYPREXA) 5 MG tablet Take 1 tablet by mouth nightly on days 1-4 of each chemotherapy cycle.    prochlorperazine (COMPAZINE) 5 mg, Oral, Every 6 hours PRN    triamcinolone acetonide 0.1% (KENALOG) 0.1 % ointment Topical (Top), 2 times daily, Apply small amount to effective area of rash x 2 weeks prn    XARELTO 20 mg, Daily       Review of patient's allergies indicates:  No Known Allergies     Patient Care Team:  Shlomo Leach MD as PCP - General (Family Medicine)     Subjective:     Review of Systems   All other systems reviewed and are negative.      12 point review of systems conducted, negative except as stated in the history of present illness. See  "[facility] for details.    Objective:     Visit Vitals  /72 (BP Location: Right arm, Patient Position: Sitting)   Pulse 72   Temp 97.8 °F (36.6 °C) (Oral)   Resp 16   Ht 5' 3" (1.6 m)   Wt 81.7 kg (180 lb 3.2 oz)   SpO2 (!) 94%   BMI 31.92 kg/m²      Vitals:    11/13/24 1000   PainSc: 0-No pain   PainLoc: Neck       Physical Exam  Vitals reviewed.   Constitutional:       Appearance: Normal appearance. He is not ill-appearing or toxic-appearing.   HENT:      Head: Normocephalic.      Right Ear: External ear normal.      Left Ear: External ear normal.      Nose: Nose normal.      Mouth/Throat:      Mouth: Mucous membranes are moist.      Pharynx: Oropharynx is clear.   Eyes:      Extraocular Movements: Extraocular movements intact.      Conjunctiva/sclera: Conjunctivae normal.      Pupils: Pupils are equal, round, and reactive to light.   Cardiovascular:      Rate and Rhythm: Normal rate and regular rhythm.      Pulses: Normal pulses.      Heart sounds: Normal heart sounds.   Pulmonary:      Effort: Pulmonary effort is normal.   Abdominal:      General: Abdomen is flat. Bowel sounds are normal. There is no distension.      Palpations: Abdomen is soft.      Tenderness: There is no abdominal tenderness.   Musculoskeletal:      Right lower leg: Edema present.      Left lower leg: Edema present.      Comments: 2+ pitting edema to BLE   Skin:     General: Skin is warm.      Comments: Raised red rash over his mid abdomen, about 7x3 cm in diameter.   Neurological:      General: No focal deficit present.      Mental Status: He is alert and oriented to person, place, and time.   Psychiatric:         Mood and Affect: Mood normal.         Behavior: Behavior normal.         Review of Symptoms      Symptom Assessment (ESAS 0-10 Scale)  Pain:  0  Dyspnea:  0  Anxiety:  0  Nausea:  0  Depression:  0  Anorexia:  0  Fatigue:  0  Insomnia:  0  Restlessness:  0  Agitation:  0         Pain Assessment:  OME in 24 hours:  " 20  Location(s):      Performance Status:  40    Living Arrangements:  Lives with friend    Psychosocial/Cultural:   See Palliative Psychosocial Note: Yes  Patient lives with girlfriend. His wife  in the past couple of years. He has a daughter and a son.  **Primary  to Follow**  Palliative Care  Consult: No        Labs Reviewed:     Chemistry:  Lab Results   Component Value Date     2024    K 4.2 2024    BUN 4.4 (L) 2024    CREATININE 0.62 (L) 2024    EGFRNORACEVR >60 2024    GLUCOSE 95 2024    CALCIUM 9.3 2024    ALKPHOS 84 2024    LABPROT 6.5 2024    ALBUMIN 2.8 (L) 2024    AST 13 2024    ALT 11 2024        Hematology:  Lab Results   Component Value Date    WBC 8.90 2024    HGB 12.0 (L) 2024    HCT 35.2 (L) 2024     2024       Urine:  Lab Results   Component Value Date    APPEARANCEUA Clear 10/01/2024    SGUA 1.007 10/01/2024    PROTEINUA Negative 10/01/2024    KETONESUA Negative 10/01/2024    LEUKOCYTESUR Negative 10/01/2024    RBCUA 6-10 (A) 10/01/2024    WBCUA 0-5 10/01/2024    BACTERIA None Seen 10/01/2024    SQEPUA None Seen 10/01/2024            I have reviewed the narcotic prescription data via .    Follow up in about 1 month (around 2024) for Follow Up, With MD. In addition to their scheduled follow up, the patient has also been instructed to follow up on as needed basis.     Future Appointments   Date Time Provider Department Center   2024 10:00 AM CHAIR 23, [facility]   2024  2:30 PM Emily Winchester FNP [facility] [address]   2024  9:50 AM LAB, [facility] OLB LAB [facility]   2024 10:20 AM [facility]   12/3/2024 10:00 AM CHAIR 36, [facility] CHEMOTHERAPY INFUSION [facility] CHEMO [facility]   2024 10:00 AM CHAIR 13, [facility] CHEMOTHERAPY INFUSION [facility] CHEMO  [facility]   12/5/2024 10:00 AM CHAIR 15, [facility] CHEMOTHERAPY INFUSION [facility] CHEMO [facility]   12/18/2024  9:30 AM Matthias Diaz MD [facility] OP PAL [facility]   4/15/2025  1:00 PM Dillan Kirkpatrick MD [facility] GBR Silvano Diaz MD    > 50% of 60 min of encounter was spent in chart review, face to face discussion of goals of care, symptom assessment, coordination of care and emotional support.     An additional 22 min of time was spent in Advanced Care Planning discussion.

## 2024-11-13 ENCOUNTER — INFUSION (OUTPATIENT)
Dept: INFUSION THERAPY | Facility: HOSPITAL | Age: 79
End: 2024-11-13
Attending: STUDENT IN AN ORGANIZED HEALTH CARE EDUCATION/TRAINING PROGRAM
Payer: MEDICARE

## 2024-11-13 ENCOUNTER — OFFICE VISIT (OUTPATIENT)
Dept: PALLIATIVE MEDICINE | Facility: CLINIC | Age: 79
End: 2024-11-13
Payer: MEDICARE

## 2024-11-13 VITALS
TEMPERATURE: 98 F | OXYGEN SATURATION: 93 % | DIASTOLIC BLOOD PRESSURE: 67 MMHG | RESPIRATION RATE: 16 BRPM | HEART RATE: 64 BPM | SYSTOLIC BLOOD PRESSURE: 114 MMHG

## 2024-11-13 VITALS
RESPIRATION RATE: 16 BRPM | HEIGHT: 63 IN | OXYGEN SATURATION: 94 % | SYSTOLIC BLOOD PRESSURE: 119 MMHG | WEIGHT: 180.19 LBS | DIASTOLIC BLOOD PRESSURE: 72 MMHG | TEMPERATURE: 98 F | HEART RATE: 72 BPM | BODY MASS INDEX: 31.93 KG/M2

## 2024-11-13 DIAGNOSIS — C7A.1 SMALL CELL NEUROENDOCRINE CARCINOMA OF LUNG: Primary | ICD-10-CM

## 2024-11-13 DIAGNOSIS — Z71.89 COUNSELING REGARDING ADVANCE CARE PLANNING AND GOALS OF CARE: Primary | ICD-10-CM

## 2024-11-13 DIAGNOSIS — C7A.1 SMALL CELL NEUROENDOCRINE CARCINOMA OF LUNG: ICD-10-CM

## 2024-11-13 DIAGNOSIS — R41.3 MEMORY IMPAIRMENT: ICD-10-CM

## 2024-11-13 DIAGNOSIS — L27.0 DERMATITIS DUE TO DRUGS OR MEDICINES: ICD-10-CM

## 2024-11-13 PROCEDURE — 25000003 PHARM REV CODE 250

## 2024-11-13 PROCEDURE — A4216 STERILE WATER/SALINE, 10 ML: HCPCS

## 2024-11-13 PROCEDURE — 99497 ADVNCD CARE PLAN 30 MIN: CPT | Mod: PBBFAC | Performed by: INTERNAL MEDICINE

## 2024-11-13 PROCEDURE — 96413 CHEMO IV INFUSION 1 HR: CPT

## 2024-11-13 PROCEDURE — 63600175 PHARM REV CODE 636 W HCPCS

## 2024-11-13 PROCEDURE — 99999 PR PBB SHADOW E&M-EST. PATIENT-LVL V: CPT | Mod: PBBFAC,,, | Performed by: INTERNAL MEDICINE

## 2024-11-13 PROCEDURE — 99215 OFFICE O/P EST HI 40 MIN: CPT | Mod: PBBFAC | Performed by: INTERNAL MEDICINE

## 2024-11-13 RX ORDER — TRIAMCINOLONE ACETONIDE 1 MG/G
OINTMENT TOPICAL 2 TIMES DAILY
Qty: 90 G | Refills: 0 | Status: SHIPPED | OUTPATIENT
Start: 2024-11-13

## 2024-11-13 RX ORDER — SODIUM CHLORIDE 0.9 % (FLUSH) 0.9 %
10 SYRINGE (ML) INJECTION
Status: DISCONTINUED | OUTPATIENT
Start: 2024-11-13 | End: 2024-11-13 | Stop reason: HOSPADM

## 2024-11-13 RX ORDER — DIPHENHYDRAMINE HYDROCHLORIDE 50 MG/ML
50 INJECTION INTRAMUSCULAR; INTRAVENOUS ONCE AS NEEDED
Status: DISCONTINUED | OUTPATIENT
Start: 2024-11-13 | End: 2024-11-13 | Stop reason: HOSPADM

## 2024-11-13 RX ORDER — EPINEPHRINE 0.3 MG/.3ML
0.3 INJECTION SUBCUTANEOUS ONCE AS NEEDED
Status: DISCONTINUED | OUTPATIENT
Start: 2024-11-13 | End: 2024-11-13 | Stop reason: HOSPADM

## 2024-11-13 RX ORDER — PROCHLORPERAZINE EDISYLATE 5 MG/ML
5 INJECTION INTRAMUSCULAR; INTRAVENOUS ONCE AS NEEDED
Status: DISCONTINUED | OUTPATIENT
Start: 2024-11-13 | End: 2024-11-13 | Stop reason: HOSPADM

## 2024-11-13 RX ORDER — HEPARIN 100 UNIT/ML
500 SYRINGE INTRAVENOUS
Status: DISCONTINUED | OUTPATIENT
Start: 2024-11-13 | End: 2024-11-13 | Stop reason: HOSPADM

## 2024-11-13 RX ADMIN — HEPARIN 500 UNITS: 100 SYRINGE at 12:11

## 2024-11-13 RX ADMIN — ETOPOSIDE 152 MG: 20 INJECTION INTRAVENOUS at 11:11

## 2024-11-13 RX ADMIN — SODIUM CHLORIDE: 9 INJECTION, SOLUTION INTRAVENOUS at 11:11

## 2024-11-13 RX ADMIN — Medication 10 ML: at 12:11

## 2024-11-14 ENCOUNTER — INFUSION (OUTPATIENT)
Dept: INFUSION THERAPY | Facility: HOSPITAL | Age: 79
End: 2024-11-14
Attending: STUDENT IN AN ORGANIZED HEALTH CARE EDUCATION/TRAINING PROGRAM
Payer: MEDICARE

## 2024-11-14 VITALS
SYSTOLIC BLOOD PRESSURE: 107 MMHG | DIASTOLIC BLOOD PRESSURE: 67 MMHG | TEMPERATURE: 97 F | RESPIRATION RATE: 18 BRPM | OXYGEN SATURATION: 91 % | HEART RATE: 77 BPM

## 2024-11-14 DIAGNOSIS — C7A.1 SMALL CELL NEUROENDOCRINE CARCINOMA OF LUNG: Primary | ICD-10-CM

## 2024-11-14 PROCEDURE — 63600175 PHARM REV CODE 636 W HCPCS

## 2024-11-14 PROCEDURE — A4216 STERILE WATER/SALINE, 10 ML: HCPCS

## 2024-11-14 PROCEDURE — 96413 CHEMO IV INFUSION 1 HR: CPT

## 2024-11-14 PROCEDURE — 96377 APPLICATON ON-BODY INJECTOR: CPT

## 2024-11-14 PROCEDURE — 25000003 PHARM REV CODE 250

## 2024-11-14 RX ORDER — PROCHLORPERAZINE EDISYLATE 5 MG/ML
5 INJECTION INTRAMUSCULAR; INTRAVENOUS ONCE AS NEEDED
Status: DISCONTINUED | OUTPATIENT
Start: 2024-11-14 | End: 2024-11-14 | Stop reason: HOSPADM

## 2024-11-14 RX ORDER — EPINEPHRINE 0.3 MG/.3ML
0.3 INJECTION SUBCUTANEOUS ONCE AS NEEDED
Status: DISCONTINUED | OUTPATIENT
Start: 2024-11-14 | End: 2024-11-14 | Stop reason: HOSPADM

## 2024-11-14 RX ORDER — DIPHENHYDRAMINE HYDROCHLORIDE 50 MG/ML
50 INJECTION INTRAMUSCULAR; INTRAVENOUS ONCE AS NEEDED
Status: DISCONTINUED | OUTPATIENT
Start: 2024-11-14 | End: 2024-11-14 | Stop reason: HOSPADM

## 2024-11-14 RX ORDER — HEPARIN 100 UNIT/ML
500 SYRINGE INTRAVENOUS
Status: DISCONTINUED | OUTPATIENT
Start: 2024-11-14 | End: 2024-11-14 | Stop reason: HOSPADM

## 2024-11-14 RX ORDER — SODIUM CHLORIDE 0.9 % (FLUSH) 0.9 %
10 SYRINGE (ML) INJECTION
Status: DISCONTINUED | OUTPATIENT
Start: 2024-11-14 | End: 2024-11-14 | Stop reason: HOSPADM

## 2024-11-14 RX ADMIN — SODIUM CHLORIDE: 9 INJECTION, SOLUTION INTRAVENOUS at 10:11

## 2024-11-14 RX ADMIN — HEPARIN 500 UNITS: 100 SYRINGE at 11:11

## 2024-11-14 RX ADMIN — Medication 10 ML: at 11:11

## 2024-11-14 RX ADMIN — PEGFILGRASTIM 6 MG: KIT SUBCUTANEOUS at 11:11

## 2024-11-14 RX ADMIN — ETOPOSIDE 152 MG: 20 INJECTION INTRAVENOUS at 10:11

## 2024-11-14 NOTE — PLAN OF CARE
C2 D3 Etoposide/Neulasta OBI given + PICC Care completed. Tolerated well. Next appts given to pt. Dc'd home in stable condition.

## 2024-11-20 ENCOUNTER — INFUSION (OUTPATIENT)
Dept: INFUSION THERAPY | Facility: HOSPITAL | Age: 79
End: 2024-11-20
Attending: STUDENT IN AN ORGANIZED HEALTH CARE EDUCATION/TRAINING PROGRAM
Payer: MEDICARE

## 2024-11-20 VITALS
HEIGHT: 63 IN | RESPIRATION RATE: 18 BRPM | TEMPERATURE: 98 F | BODY MASS INDEX: 31.92 KG/M2 | SYSTOLIC BLOOD PRESSURE: 121 MMHG | DIASTOLIC BLOOD PRESSURE: 69 MMHG | HEART RATE: 85 BPM | WEIGHT: 180.13 LBS

## 2024-11-20 DIAGNOSIS — C7A.1 SMALL CELL NEUROENDOCRINE CARCINOMA OF LUNG: Primary | ICD-10-CM

## 2024-11-20 PROCEDURE — 63600175 PHARM REV CODE 636 W HCPCS: Performed by: STUDENT IN AN ORGANIZED HEALTH CARE EDUCATION/TRAINING PROGRAM

## 2024-11-20 PROCEDURE — 96523 IRRIG DRUG DELIVERY DEVICE: CPT

## 2024-11-20 RX ORDER — SODIUM CHLORIDE 0.9 % (FLUSH) 0.9 %
10 SYRINGE (ML) INJECTION
Status: DISCONTINUED | OUTPATIENT
Start: 2024-11-20 | End: 2024-11-20 | Stop reason: HOSPADM

## 2024-11-20 RX ORDER — SODIUM CHLORIDE 0.9 % (FLUSH) 0.9 %
10 SYRINGE (ML) INJECTION
OUTPATIENT
Start: 2024-11-20

## 2024-11-20 RX ORDER — HEPARIN 100 UNIT/ML
500 SYRINGE INTRAVENOUS
Status: COMPLETED | OUTPATIENT
Start: 2024-11-20 | End: 2024-11-20

## 2024-11-20 RX ORDER — HEPARIN 100 UNIT/ML
500 SYRINGE INTRAVENOUS
Status: DISCONTINUED | OUTPATIENT
Start: 2024-11-20 | End: 2024-11-20 | Stop reason: HOSPADM

## 2024-11-20 RX ORDER — HEPARIN 100 UNIT/ML
500 SYRINGE INTRAVENOUS
OUTPATIENT
Start: 2024-11-20

## 2024-11-20 RX ADMIN — HEPARIN 500 UNITS: 100 SYRINGE at 09:11

## 2024-11-25 NOTE — PROGRESS NOTES
Subjective:       Patient ID: Gerald J Lejeune is a 79 y.o. male.    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 He has a 150+ pack year smoking history, quit 1 year prior to presentation, on 3-4L NC at baseline. He presented to [facility] in September '24 for new onset seizures. Workup was done which was unrevealing of etiology, but during that workup he was found to have large volume R pleural effusion, large mediastinal LAD, and R mainstem mucous plugging vs mass obstruction. He has had his R sided pleural effusion drained twice due to quick reaccumulation, cytology negative x2. He underwent EBUS on 10/2 where within the R mainstem bronchus a near complete occlusion due to fungating mass was noted. Biopsy was done with final pathology revealed grade 3 neuroendocrine carcinoma consistent with small cell lung cancer.     His diagnosis, staging, and prognosis. The NCCN guidelines when discussing treatment therapy were referenced. The common side effects of chemotherapy and immunotherapy in detail including fatigue, nausea, vomiting, constipation, diarrhea, and increased risk of infections were explained. His median OS with and without treatment was discussed.  The role of palliative care as a supplement to treatment to support him and his family with the symptoms of his cancer was also discussed. They were agreeable to proceed.     October '24 PET with evidence of extensive disease to R lobe with pleural effusion and hypermetabolic LAD. No evidence of disease outside of the chest. He have since transitioned into 1st line carboplatin AUC 4, Etoposide 80mg/m2, and Atezolizumab 1200mg Q3w x 4 cycles in the palliative setting then repeat PET to assess response. Chemotherapy dose reduced due to patients  age, comorbidities, and performance status.     Interval History:   Patient presents to clinic today for MD follow up appointment and labs for toxicity check prior to C3D1 Carbo/Etop/Atezolizumab on 12/3.  He states he is doing okay today.  Tolerating treatment using PICC line with no major issue.  Denies any worsening dyspnea.  Currently using oxygen PRN with O2 saturation below 90.  He has not had to use oxygen the entire weekend.  New rash to abdomen and leg which has improved with medication.  His energy level is improving.  Reports one episode of constipation relieved with meds.  Otherwise, he has no further complaints or concerns.    Past Medical History:   Diagnosis Date    Adenopathy     Anesthesia complication     takes longer to wake    Anxiety     BPH (benign prostatic hyperplasia)     COPD (chronic obstructive pulmonary disease)     Dementia     Depression     Hyperlipidemia     Hypertension     Obesity     Oxygen dependent     4L/NC    PAD (peripheral artery disease)     Post-obstructive pneumonia due to foreign body aspiration     Levaquin    Seizures     Shortness of breath     Sleep apnea     CPAP    Walker as ambulation aid       Past Surgical History:   Procedure Laterality Date    BELOW KNEE AMPUTATION OF LOWER EXTREMITY Right     BRONCHOSCOPY, WITH TRANSBRONCHIAL BIOPSY Right 10/2/2024    Procedure: BRONCHOSCOPY, WITH TRANSBRONCHIAL BIOPSY;  Surgeon: Lee Suarez Jr., MD, [facility];  Location: [facility] BRONCHOSCOPY LAB;  Service: Pulmonary;  Laterality: Right;  RUL    ENDOBRONCHIAL ULTRASOUND N/A 10/2/2024    Procedure: ENDOBRONCHIAL ULTRASOUND (EBUS)  with FLURO;  Surgeon: Lee Suarez Jr., MD, [facility];  Location: [facility] BRONCHOSCOPY LAB;  Service: Pulmonary;  Laterality: N/A;    INSERTION, STENT, BARE METAL, ARTERY, PERIPHERAL  2019    PERIPHERALLY INSERTED CENTRAL CATHETER INSERTION N/A 10/14/2024    Procedure: Insertion-picc;  Surgeon: Lejeune, Elizabeth Kennedy, MD;  Location: [facility] OR;  Service:  Oncology;  Laterality: N/A;    THORACENTESIS  2024     Social History     Socioeconomic History    Marital status:    Tobacco Use    Smoking status: Former     Current packs/day: 0.00     Types: Cigarettes     Quit date: 2022     Years since quittin.8    Smokeless tobacco: Never   Substance and Sexual Activity    Alcohol use: Not Currently     Alcohol/week: 2.0 standard drinks of alcohol     Types: 2 Cans of beer per week    Drug use: Never    Sexual activity: Not Currently     Partners: Female     Birth control/protection: None     Social Drivers of Health     Financial Resource Strain: Low Risk  (11/10/2024)    Overall Financial Resource Strain (CARDIA)     Difficulty of Paying Living Expenses: Not hard at all   Food Insecurity: No Food Insecurity (11/10/2024)    Hunger Vital Sign     Worried About Running Out of Food in the Last Year: Never true     Ran Out of Food in the Last Year: Never true   Transportation Needs: No Transportation Needs (10/1/2024)    TRANSPORTATION NEEDS     Transportation : No   Physical Activity: Inactive (11/10/2024)    Exercise Vital Sign     Days of Exercise per Week: 0 days     Minutes of Exercise per Session: 10 min   Stress: No Stress Concern Present (11/10/2024)    [employer] - Occupational Stress Questionnaire     Feeling of Stress : Only a little   Housing Stability: Low Risk  (11/10/2024)    Housing Stability Vital Sign     Unable to Pay for Housing in the Last Year: No     Homeless in the Last Year: No      No family history on file.   Review of patient's allergies indicates:  No Known Allergies   Review of Systems   Constitutional:  Negative for activity change, appetite change, chills, fatigue, fever and unexpected weight change.   HENT:  Negative for mouth sores and sore throat.    Eyes:  Negative for visual disturbance.   Respiratory:  Negative for cough and shortness of breath.    Cardiovascular:  Negative for chest pain.    Gastrointestinal:  Negative for abdominal pain, constipation, diarrhea, nausea and vomiting.   Endocrine: Negative for polyuria.   Genitourinary:  Negative for dysuria and frequency.   Musculoskeletal:  Negative for back pain.   Integumentary:  Negative for rash.   Allergic/Immunologic: Negative for frequent infections.   Neurological:  Negative for weakness and headaches.   Hematological:  Negative for adenopathy. Does not bruise/bleed easily.   Psychiatric/Behavioral:  The patient is nervous/anxious.          Objective:      Vitals:    12/02/24 1031   BP: 118/73   Pulse: 81   Resp: 18   Temp: 97.4 °F (36.3 °C)         Physical Exam  Constitutional:       General: He is not in acute distress.     Appearance: Normal appearance. He is ill-appearing.   HENT:      Head: Normocephalic and atraumatic.      Nose: Nose normal.      Mouth/Throat:      Mouth: Mucous membranes are moist.      Pharynx: Oropharynx is clear.   Eyes:      Extraocular Movements: Extraocular movements intact.      Conjunctiva/sclera: Conjunctivae normal.      Pupils: Pupils are equal, round, and reactive to light.   Cardiovascular:      Rate and Rhythm: Normal rate and regular rhythm.      Pulses: Normal pulses.      Heart sounds: Normal heart sounds. No murmur heard.  Pulmonary:      Effort: Pulmonary effort is normal. No respiratory distress.      Comments: 3L NC  Coarse minimal BS on R side  Abdominal:      General: There is no distension.      Palpations: Abdomen is soft.      Tenderness: There is no abdominal tenderness.   Musculoskeletal:         General: Deformity (R BKA) present. Normal range of motion.      Cervical back: Normal range of motion and neck supple.      Right lower leg: No edema.      Left lower leg: No edema.   Lymphadenopathy:      Cervical: No cervical adenopathy.   Skin:     General: Skin is warm and dry.   Neurological:      General: No focal deficit present.      Mental Status: He is alert and oriented to person,  place, and time.         LABS AND IMAGING REVIEWED IN EPIC    IMAGING:  10/9/24 PET/CT:  Impression:  Large conglomerate right hilar/perihilar mass with moderate FDG avidity.  There are postobstructive changes.  Small right pleural effusion with FDG avid pleural nodule dependently at the right hemithorax.  Hypermetabolic right hilar, mediastinal and right supraclavicular lymph nodes.      PATHOLOGY:  10/2/24 EBUS:  FINAL DIAGNOSIS   1. LYMPH NODE 4R, EBUS-GUIDED BIOPSY:   METASTATIC SMALL CELL CARCINOMA (HIGH GRADE NEUROENDOCRINE CARCINOMA).   IMMUNOHISTOCHEMICAL STAINS PERFORMED WITH ADEQUATE CONTROLS:   KI-67, LOW MOLECULAR WEIGHT CYTOKERATIN, CD56, SYNAPTOPHYSIN, TTF-1 AND   CHROMOGRANIN:  INCONCLUSIVE DUE TO EXTENSIVE NECROSIS.      2. RIGHT LUNG, UPPER LOBE, BIOPSY:   HIGH GRADE NEUROENDOCRINE CARCINOMA (SMALL CELL CARCINOMA).   IMMUNOHISTOCHEMICAL STAINS PERFORMED WITH ADEQUATE CONTROLS:   CD56, LOW MOLECULAR WEIGHT CYTOKERATIN, SYNAPTOPHYSIN, CHROMOGRANIN AND TTF-1:   STRONGLY POSITIVE IN MALIGNANT CELLS.   KI-67:  SHOWS NEAR 100% NUCLEAR POSITIVITY IN MALIGNANT CELLS.     Assessment:   Extensive Stage R Lung SCLC - with recurrent pleural effusions and near obstructing R mainstem mass. Would be a candidate for palliative chemotherapy and would consider palliative radiation given the near occlusion of his mainstem if no immediate response.     His diagnosis, stage, and overall prognosis was discussed. We discussed the goals with therapy and without and that the goals of treatment were palliative. He does wish to proceed.     Immunodeficiency due to Drug Therapy - Precautions discussed with patient. Continue to monitor for any signs of symptoms of infection. No prophylaxis needed at this time. On growth factor with treatment.     Plan:   -Toxicity reviewed; okay to proceed with C3D1-3 of Carbo/Etop/Atezolizumab with growth factor on 12/3 to 12/5.   - Plan for repeat scan after C4  - RTC 3 weeks for NP visit,  labs same day - CBC, CMP, TSH    Call Daughter with all appts/information      I spent a total of 40 minutes on the day of the visit.This includes face to face time and non-face to face time preparing to see the patient (eg, review of tests), obtaining and/or reviewing separately obtained history, documenting clinical information in the electronic or other health record, independently interpreting results and communicating results to the patient/family/caregiver, or care coordinator.      Elizabeth Lejeune, MD  Hematology/Oncology   [facility]    Professional Services   I, Yanelis Galdamez LPN, acted solely as a scribe for and in the presence of Dr. Elizabeth Kennedy LeJeune, who performed these services.

## 2024-11-27 ENCOUNTER — INFUSION (OUTPATIENT)
Dept: INFUSION THERAPY | Facility: HOSPITAL | Age: 79
End: 2024-11-27
Attending: STUDENT IN AN ORGANIZED HEALTH CARE EDUCATION/TRAINING PROGRAM
Payer: MEDICARE

## 2024-11-27 VITALS
SYSTOLIC BLOOD PRESSURE: 131 MMHG | HEART RATE: 89 BPM | TEMPERATURE: 98 F | DIASTOLIC BLOOD PRESSURE: 76 MMHG | OXYGEN SATURATION: 88 %

## 2024-11-27 DIAGNOSIS — C7A.1 SMALL CELL NEUROENDOCRINE CARCINOMA OF LUNG: Primary | ICD-10-CM

## 2024-11-27 PROCEDURE — A4216 STERILE WATER/SALINE, 10 ML: HCPCS | Performed by: STUDENT IN AN ORGANIZED HEALTH CARE EDUCATION/TRAINING PROGRAM

## 2024-11-27 PROCEDURE — 25000003 PHARM REV CODE 250: Performed by: STUDENT IN AN ORGANIZED HEALTH CARE EDUCATION/TRAINING PROGRAM

## 2024-11-27 PROCEDURE — 96523 IRRIG DRUG DELIVERY DEVICE: CPT

## 2024-11-27 PROCEDURE — 63600175 PHARM REV CODE 636 W HCPCS: Performed by: STUDENT IN AN ORGANIZED HEALTH CARE EDUCATION/TRAINING PROGRAM

## 2024-11-27 RX ORDER — SODIUM CHLORIDE 0.9 % (FLUSH) 0.9 %
10 SYRINGE (ML) INJECTION
OUTPATIENT
Start: 2024-11-27

## 2024-11-27 RX ORDER — HEPARIN 100 UNIT/ML
500 SYRINGE INTRAVENOUS
Status: COMPLETED | OUTPATIENT
Start: 2024-11-27 | End: 2024-11-27

## 2024-11-27 RX ORDER — HEPARIN 100 UNIT/ML
500 SYRINGE INTRAVENOUS
OUTPATIENT
Start: 2024-11-27

## 2024-11-27 RX ORDER — SODIUM CHLORIDE 0.9 % (FLUSH) 0.9 %
10 SYRINGE (ML) INJECTION
Status: COMPLETED | OUTPATIENT
Start: 2024-11-27 | End: 2024-11-27

## 2024-11-27 RX ADMIN — Medication 10 ML: at 11:11

## 2024-11-27 RX ADMIN — SODIUM CHLORIDE, PRESERVATIVE FREE 10 ML: 5 INJECTION INTRAVENOUS at 11:11

## 2024-11-27 RX ADMIN — HEPARIN 500 UNITS: 100 SYRINGE at 11:11

## 2024-12-02 ENCOUNTER — OFFICE VISIT (OUTPATIENT)
Dept: HEMATOLOGY/ONCOLOGY | Facility: CLINIC | Age: 79
End: 2024-12-02
Payer: MEDICARE

## 2024-12-02 ENCOUNTER — LAB VISIT (OUTPATIENT)
Dept: LAB | Facility: HOSPITAL | Age: 79
End: 2024-12-02
Attending: STUDENT IN AN ORGANIZED HEALTH CARE EDUCATION/TRAINING PROGRAM
Payer: MEDICARE

## 2024-12-02 VITALS
BODY MASS INDEX: 31.57 KG/M2 | RESPIRATION RATE: 18 BRPM | SYSTOLIC BLOOD PRESSURE: 118 MMHG | OXYGEN SATURATION: 91 % | HEART RATE: 81 BPM | WEIGHT: 178.19 LBS | HEIGHT: 63 IN | TEMPERATURE: 97 F | DIASTOLIC BLOOD PRESSURE: 73 MMHG

## 2024-12-02 DIAGNOSIS — C7A.1 SMALL CELL NEUROENDOCRINE CARCINOMA OF LUNG: ICD-10-CM

## 2024-12-02 DIAGNOSIS — Z79.899 IMMUNODEFICIENCY DUE TO DRUG THERAPY: Primary | ICD-10-CM

## 2024-12-02 DIAGNOSIS — Z79.899 LONG-TERM CURRENT USE OF HIGH RISK MEDICATION OTHER THAN ANTICOAGULANT: ICD-10-CM

## 2024-12-02 DIAGNOSIS — D84.821 IMMUNODEFICIENCY DUE TO DRUG THERAPY: Primary | ICD-10-CM

## 2024-12-02 LAB
ALBUMIN SERPL-MCNC: 3.4 G/DL (ref 3.4–4.8)
ALBUMIN/GLOB SERPL: 1.1 RATIO (ref 1.1–2)
ALP SERPL-CCNC: 122 UNIT/L (ref 40–150)
ALT SERPL-CCNC: 18 UNIT/L (ref 0–55)
ANION GAP SERPL CALC-SCNC: 10 MEQ/L
AST SERPL-CCNC: 15 UNIT/L (ref 5–34)
BASOPHILS # BLD AUTO: 0.05 X10(3)/MCL
BASOPHILS NFR BLD AUTO: 0.3 %
BILIRUB SERPL-MCNC: 0.2 MG/DL
BUN SERPL-MCNC: 5.2 MG/DL (ref 8.4–25.7)
CALCIUM SERPL-MCNC: 9.5 MG/DL (ref 8.8–10)
CHLORIDE SERPL-SCNC: 104 MMOL/L (ref 98–107)
CO2 SERPL-SCNC: 27 MMOL/L (ref 23–31)
CREAT SERPL-MCNC: 0.76 MG/DL (ref 0.72–1.25)
CREAT/UREA NIT SERPL: 7
EOSINOPHIL # BLD AUTO: 0 X10(3)/MCL (ref 0–0.9)
EOSINOPHIL NFR BLD AUTO: 0 %
ERYTHROCYTE [DISTWIDTH] IN BLOOD BY AUTOMATED COUNT: 17.7 % (ref 11.5–17)
GFR SERPLBLD CREATININE-BSD FMLA CKD-EPI: >60 ML/MIN/1.73/M2
GLOBULIN SER-MCNC: 3.2 GM/DL (ref 2.4–3.5)
GLUCOSE SERPL-MCNC: 90 MG/DL (ref 82–115)
HCT VFR BLD AUTO: 37 % (ref 42–52)
HGB BLD-MCNC: 12.1 G/DL (ref 14–18)
IMM GRANULOCYTES # BLD AUTO: 2.78 X10(3)/MCL (ref 0–0.04)
IMM GRANULOCYTES NFR BLD AUTO: 14.2 %
LYMPHOCYTES # BLD AUTO: 1.94 X10(3)/MCL (ref 0.6–4.6)
LYMPHOCYTES NFR BLD AUTO: 9.9 %
MCH RBC QN AUTO: 29.4 PG (ref 27–31)
MCHC RBC AUTO-ENTMCNC: 32.7 G/DL (ref 33–36)
MCV RBC AUTO: 89.8 FL (ref 80–94)
MONOCYTES # BLD AUTO: 0.97 X10(3)/MCL (ref 0.1–1.3)
MONOCYTES NFR BLD AUTO: 5 %
NEUTROPHILS # BLD AUTO: 13.78 X10(3)/MCL (ref 2.1–9.2)
NEUTROPHILS NFR BLD AUTO: 70.6 %
PLATELET # BLD AUTO: 202 X10(3)/MCL (ref 130–400)
PMV BLD AUTO: 9.9 FL (ref 7.4–10.4)
POTASSIUM SERPL-SCNC: 3.8 MMOL/L (ref 3.5–5.1)
PROT SERPL-MCNC: 6.6 GM/DL (ref 5.8–7.6)
RBC # BLD AUTO: 4.12 X10(6)/MCL (ref 4.7–6.1)
SODIUM SERPL-SCNC: 141 MMOL/L (ref 136–145)
TSH SERPL-ACNC: 0.47 UIU/ML (ref 0.35–4.94)
WBC # BLD AUTO: 19.52 X10(3)/MCL (ref 4.5–11.5)

## 2024-12-02 PROCEDURE — 36415 COLL VENOUS BLD VENIPUNCTURE: CPT

## 2024-12-02 PROCEDURE — 80053 COMPREHEN METABOLIC PANEL: CPT

## 2024-12-02 PROCEDURE — 99215 OFFICE O/P EST HI 40 MIN: CPT | Mod: S$PBB,,, | Performed by: STUDENT IN AN ORGANIZED HEALTH CARE EDUCATION/TRAINING PROGRAM

## 2024-12-02 PROCEDURE — 85025 COMPLETE CBC W/AUTO DIFF WBC: CPT

## 2024-12-02 PROCEDURE — 99214 OFFICE O/P EST MOD 30 MIN: CPT | Mod: PBBFAC | Performed by: STUDENT IN AN ORGANIZED HEALTH CARE EDUCATION/TRAINING PROGRAM

## 2024-12-02 PROCEDURE — 99999 PR PBB SHADOW E&M-EST. PATIENT-LVL IV: CPT | Mod: PBBFAC,,, | Performed by: STUDENT IN AN ORGANIZED HEALTH CARE EDUCATION/TRAINING PROGRAM

## 2024-12-02 PROCEDURE — 84443 ASSAY THYROID STIM HORMONE: CPT

## 2024-12-03 ENCOUNTER — INFUSION (OUTPATIENT)
Dept: INFUSION THERAPY | Facility: HOSPITAL | Age: 79
End: 2024-12-03
Attending: STUDENT IN AN ORGANIZED HEALTH CARE EDUCATION/TRAINING PROGRAM
Payer: MEDICARE

## 2024-12-03 VITALS
OXYGEN SATURATION: 92 % | DIASTOLIC BLOOD PRESSURE: 74 MMHG | SYSTOLIC BLOOD PRESSURE: 124 MMHG | RESPIRATION RATE: 18 BRPM | HEART RATE: 79 BPM | TEMPERATURE: 98 F | BODY MASS INDEX: 31.57 KG/M2 | WEIGHT: 178.19 LBS | HEIGHT: 63 IN

## 2024-12-03 DIAGNOSIS — C7A.1 SMALL CELL NEUROENDOCRINE CARCINOMA OF LUNG: Primary | ICD-10-CM

## 2024-12-03 PROCEDURE — A4216 STERILE WATER/SALINE, 10 ML: HCPCS | Performed by: STUDENT IN AN ORGANIZED HEALTH CARE EDUCATION/TRAINING PROGRAM

## 2024-12-03 PROCEDURE — 63600175 PHARM REV CODE 636 W HCPCS: Mod: JZ,JG | Performed by: STUDENT IN AN ORGANIZED HEALTH CARE EDUCATION/TRAINING PROGRAM

## 2024-12-03 PROCEDURE — 96375 TX/PRO/DX INJ NEW DRUG ADDON: CPT

## 2024-12-03 PROCEDURE — 96417 CHEMO IV INFUS EACH ADDL SEQ: CPT

## 2024-12-03 PROCEDURE — 96413 CHEMO IV INFUSION 1 HR: CPT

## 2024-12-03 PROCEDURE — 25000003 PHARM REV CODE 250: Performed by: STUDENT IN AN ORGANIZED HEALTH CARE EDUCATION/TRAINING PROGRAM

## 2024-12-03 RX ORDER — PROCHLORPERAZINE EDISYLATE 5 MG/ML
5 INJECTION INTRAMUSCULAR; INTRAVENOUS ONCE AS NEEDED
Status: CANCELLED | OUTPATIENT
Start: 2024-12-04

## 2024-12-03 RX ORDER — EPINEPHRINE 0.3 MG/.3ML
0.3 INJECTION SUBCUTANEOUS ONCE AS NEEDED
Status: DISCONTINUED | OUTPATIENT
Start: 2024-12-03 | End: 2024-12-03 | Stop reason: HOSPADM

## 2024-12-03 RX ORDER — EPINEPHRINE 0.3 MG/.3ML
0.3 INJECTION SUBCUTANEOUS ONCE AS NEEDED
Status: CANCELLED | OUTPATIENT
Start: 2024-12-04

## 2024-12-03 RX ORDER — EPINEPHRINE 0.3 MG/.3ML
0.3 INJECTION SUBCUTANEOUS ONCE AS NEEDED
Status: CANCELLED | OUTPATIENT
Start: 2024-12-05

## 2024-12-03 RX ORDER — HEPARIN 100 UNIT/ML
500 SYRINGE INTRAVENOUS
Status: CANCELLED | OUTPATIENT
Start: 2024-12-04

## 2024-12-03 RX ORDER — DIPHENHYDRAMINE HYDROCHLORIDE 50 MG/ML
50 INJECTION INTRAMUSCULAR; INTRAVENOUS ONCE AS NEEDED
Status: CANCELLED | OUTPATIENT
Start: 2024-12-04

## 2024-12-03 RX ORDER — DIPHENHYDRAMINE HYDROCHLORIDE 50 MG/ML
50 INJECTION INTRAMUSCULAR; INTRAVENOUS ONCE AS NEEDED
Status: CANCELLED | OUTPATIENT
Start: 2024-12-05

## 2024-12-03 RX ORDER — SODIUM CHLORIDE 0.9 % (FLUSH) 0.9 %
10 SYRINGE (ML) INJECTION
Status: CANCELLED | OUTPATIENT
Start: 2024-12-04

## 2024-12-03 RX ORDER — SODIUM CHLORIDE 0.9 % (FLUSH) 0.9 %
10 SYRINGE (ML) INJECTION
Status: CANCELLED | OUTPATIENT
Start: 2024-12-05

## 2024-12-03 RX ORDER — HEPARIN 100 UNIT/ML
500 SYRINGE INTRAVENOUS
Status: DISCONTINUED | OUTPATIENT
Start: 2024-12-03 | End: 2024-12-03 | Stop reason: HOSPADM

## 2024-12-03 RX ORDER — EPINEPHRINE 0.3 MG/.3ML
0.3 INJECTION SUBCUTANEOUS ONCE AS NEEDED
Status: CANCELLED | OUTPATIENT
Start: 2024-12-03

## 2024-12-03 RX ORDER — HEPARIN 100 UNIT/ML
500 SYRINGE INTRAVENOUS
Status: CANCELLED | OUTPATIENT
Start: 2024-12-05

## 2024-12-03 RX ORDER — PROCHLORPERAZINE EDISYLATE 5 MG/ML
5 INJECTION INTRAMUSCULAR; INTRAVENOUS ONCE AS NEEDED
Status: DISCONTINUED | OUTPATIENT
Start: 2024-12-03 | End: 2024-12-03 | Stop reason: HOSPADM

## 2024-12-03 RX ORDER — DIPHENHYDRAMINE HYDROCHLORIDE 50 MG/ML
50 INJECTION INTRAMUSCULAR; INTRAVENOUS ONCE AS NEEDED
Status: DISCONTINUED | OUTPATIENT
Start: 2024-12-03 | End: 2024-12-03 | Stop reason: HOSPADM

## 2024-12-03 RX ORDER — PROCHLORPERAZINE EDISYLATE 5 MG/ML
5 INJECTION INTRAMUSCULAR; INTRAVENOUS ONCE AS NEEDED
Status: CANCELLED | OUTPATIENT
Start: 2024-12-05

## 2024-12-03 RX ORDER — PROCHLORPERAZINE EDISYLATE 5 MG/ML
5 INJECTION INTRAMUSCULAR; INTRAVENOUS ONCE AS NEEDED
Status: CANCELLED | OUTPATIENT
Start: 2024-12-03

## 2024-12-03 RX ORDER — DIPHENHYDRAMINE HYDROCHLORIDE 50 MG/ML
50 INJECTION INTRAMUSCULAR; INTRAVENOUS ONCE AS NEEDED
Status: CANCELLED | OUTPATIENT
Start: 2024-12-03

## 2024-12-03 RX ORDER — SODIUM CHLORIDE 0.9 % (FLUSH) 0.9 %
10 SYRINGE (ML) INJECTION
Status: DISCONTINUED | OUTPATIENT
Start: 2024-12-03 | End: 2024-12-03 | Stop reason: HOSPADM

## 2024-12-03 RX ORDER — HEPARIN 100 UNIT/ML
500 SYRINGE INTRAVENOUS
Status: CANCELLED | OUTPATIENT
Start: 2024-12-03

## 2024-12-03 RX ORDER — SODIUM CHLORIDE 0.9 % (FLUSH) 0.9 %
10 SYRINGE (ML) INJECTION
Status: CANCELLED | OUTPATIENT
Start: 2024-12-03

## 2024-12-03 RX ADMIN — DEXAMETHASONE SODIUM PHOSPHATE 0.25 MG: 4 INJECTION, SOLUTION INTRA-ARTICULAR; INTRALESIONAL; INTRAMUSCULAR; INTRAVENOUS; SOFT TISSUE at 11:12

## 2024-12-03 RX ADMIN — ATEZOLIZUMAB 1200 MG: 1200 INJECTION, SOLUTION INTRAVENOUS at 10:12

## 2024-12-03 RX ADMIN — CARBOPLATIN 470 MG: 10 INJECTION, SOLUTION INTRAVENOUS at 11:12

## 2024-12-03 RX ADMIN — ETOPOSIDE 152 MG: 20 INJECTION INTRAVENOUS at 12:12

## 2024-12-03 RX ADMIN — Medication 10 ML: at 01:12

## 2024-12-03 RX ADMIN — SODIUM CHLORIDE: 9 INJECTION, SOLUTION INTRAVENOUS at 10:12

## 2024-12-03 RX ADMIN — HEPARIN 500 UNITS: 100 SYRINGE at 01:12

## 2024-12-03 RX ADMIN — APREPITANT 130 MG: 130 INJECTION, EMULSION INTRAVENOUS at 11:12

## 2024-12-03 NOTE — PLAN OF CARE
Plan of care reviewed with patient; patient and S/O in agreement. C3D1 completed. Appts reviewed and printed for patient.

## 2024-12-04 ENCOUNTER — INFUSION (OUTPATIENT)
Dept: INFUSION THERAPY | Facility: HOSPITAL | Age: 79
End: 2024-12-04
Attending: STUDENT IN AN ORGANIZED HEALTH CARE EDUCATION/TRAINING PROGRAM
Payer: MEDICARE

## 2024-12-04 VITALS
OXYGEN SATURATION: 97 % | SYSTOLIC BLOOD PRESSURE: 148 MMHG | DIASTOLIC BLOOD PRESSURE: 76 MMHG | TEMPERATURE: 98 F | RESPIRATION RATE: 16 BRPM | HEART RATE: 59 BPM

## 2024-12-04 DIAGNOSIS — C7A.1 SMALL CELL NEUROENDOCRINE CARCINOMA OF LUNG: Primary | ICD-10-CM

## 2024-12-04 PROCEDURE — 96413 CHEMO IV INFUSION 1 HR: CPT

## 2024-12-04 PROCEDURE — 63600175 PHARM REV CODE 636 W HCPCS: Performed by: STUDENT IN AN ORGANIZED HEALTH CARE EDUCATION/TRAINING PROGRAM

## 2024-12-04 PROCEDURE — A4216 STERILE WATER/SALINE, 10 ML: HCPCS | Performed by: STUDENT IN AN ORGANIZED HEALTH CARE EDUCATION/TRAINING PROGRAM

## 2024-12-04 PROCEDURE — 25000003 PHARM REV CODE 250: Performed by: STUDENT IN AN ORGANIZED HEALTH CARE EDUCATION/TRAINING PROGRAM

## 2024-12-04 RX ORDER — SODIUM CHLORIDE 0.9 % (FLUSH) 0.9 %
10 SYRINGE (ML) INJECTION
OUTPATIENT
Start: 2024-12-04

## 2024-12-04 RX ORDER — SODIUM CHLORIDE 0.9 % (FLUSH) 0.9 %
10 SYRINGE (ML) INJECTION
Status: COMPLETED | OUTPATIENT
Start: 2024-12-04 | End: 2024-12-04

## 2024-12-04 RX ORDER — HEPARIN 100 UNIT/ML
500 SYRINGE INTRAVENOUS
OUTPATIENT
Start: 2024-12-04

## 2024-12-04 RX ORDER — SODIUM CHLORIDE 0.9 % (FLUSH) 0.9 %
10 SYRINGE (ML) INJECTION
Status: DISCONTINUED | OUTPATIENT
Start: 2024-12-04 | End: 2024-12-04 | Stop reason: HOSPADM

## 2024-12-04 RX ORDER — PROCHLORPERAZINE EDISYLATE 5 MG/ML
5 INJECTION INTRAMUSCULAR; INTRAVENOUS ONCE AS NEEDED
Status: DISCONTINUED | OUTPATIENT
Start: 2024-12-04 | End: 2024-12-04 | Stop reason: HOSPADM

## 2024-12-04 RX ORDER — DIPHENHYDRAMINE HYDROCHLORIDE 50 MG/ML
50 INJECTION INTRAMUSCULAR; INTRAVENOUS ONCE AS NEEDED
Status: DISCONTINUED | OUTPATIENT
Start: 2024-12-04 | End: 2024-12-04 | Stop reason: HOSPADM

## 2024-12-04 RX ORDER — HEPARIN 100 UNIT/ML
500 SYRINGE INTRAVENOUS
Status: DISCONTINUED | OUTPATIENT
Start: 2024-12-04 | End: 2024-12-04 | Stop reason: HOSPADM

## 2024-12-04 RX ORDER — EPINEPHRINE 0.3 MG/.3ML
0.3 INJECTION SUBCUTANEOUS ONCE AS NEEDED
Status: DISCONTINUED | OUTPATIENT
Start: 2024-12-04 | End: 2024-12-04 | Stop reason: HOSPADM

## 2024-12-04 RX ORDER — HEPARIN 100 UNIT/ML
500 SYRINGE INTRAVENOUS
Status: COMPLETED | OUTPATIENT
Start: 2024-12-04 | End: 2024-12-04

## 2024-12-04 RX ADMIN — Medication 10 ML: at 12:12

## 2024-12-04 RX ADMIN — HEPARIN 500 UNITS: 100 SYRINGE at 12:12

## 2024-12-04 RX ADMIN — SODIUM CHLORIDE: 9 INJECTION, SOLUTION INTRAVENOUS at 10:12

## 2024-12-04 RX ADMIN — SODIUM CHLORIDE 152 MG: 0.9 INJECTION, SOLUTION INTRAVENOUS at 11:12

## 2024-12-04 NOTE — NURSING
PICC dressing change done.  No signs or symptoms of infection.   Pt tolerated treatment with no complaints.  Next appt reviewed with pt.

## 2024-12-05 ENCOUNTER — INFUSION (OUTPATIENT)
Dept: INFUSION THERAPY | Facility: HOSPITAL | Age: 79
End: 2024-12-05
Attending: STUDENT IN AN ORGANIZED HEALTH CARE EDUCATION/TRAINING PROGRAM
Payer: MEDICARE

## 2024-12-05 VITALS
DIASTOLIC BLOOD PRESSURE: 80 MMHG | TEMPERATURE: 98 F | OXYGEN SATURATION: 93 % | SYSTOLIC BLOOD PRESSURE: 119 MMHG | RESPIRATION RATE: 16 BRPM | HEART RATE: 70 BPM

## 2024-12-05 DIAGNOSIS — C7A.1 SMALL CELL NEUROENDOCRINE CARCINOMA OF LUNG: Primary | ICD-10-CM

## 2024-12-05 PROCEDURE — 96413 CHEMO IV INFUSION 1 HR: CPT

## 2024-12-05 PROCEDURE — 25000003 PHARM REV CODE 250: Performed by: STUDENT IN AN ORGANIZED HEALTH CARE EDUCATION/TRAINING PROGRAM

## 2024-12-05 PROCEDURE — 96377 APPLICATON ON-BODY INJECTOR: CPT

## 2024-12-05 PROCEDURE — 63600175 PHARM REV CODE 636 W HCPCS: Mod: JZ,JG | Performed by: STUDENT IN AN ORGANIZED HEALTH CARE EDUCATION/TRAINING PROGRAM

## 2024-12-05 RX ORDER — DIPHENHYDRAMINE HYDROCHLORIDE 50 MG/ML
50 INJECTION INTRAMUSCULAR; INTRAVENOUS ONCE AS NEEDED
Status: DISCONTINUED | OUTPATIENT
Start: 2024-12-05 | End: 2024-12-05 | Stop reason: HOSPADM

## 2024-12-05 RX ORDER — EPINEPHRINE 0.3 MG/.3ML
0.3 INJECTION SUBCUTANEOUS ONCE AS NEEDED
Status: DISCONTINUED | OUTPATIENT
Start: 2024-12-05 | End: 2024-12-05 | Stop reason: HOSPADM

## 2024-12-05 RX ORDER — SODIUM CHLORIDE 0.9 % (FLUSH) 0.9 %
10 SYRINGE (ML) INJECTION
Status: DISCONTINUED | OUTPATIENT
Start: 2024-12-05 | End: 2024-12-05 | Stop reason: HOSPADM

## 2024-12-05 RX ORDER — HEPARIN 100 UNIT/ML
500 SYRINGE INTRAVENOUS
Status: DISCONTINUED | OUTPATIENT
Start: 2024-12-05 | End: 2024-12-05 | Stop reason: HOSPADM

## 2024-12-05 RX ORDER — PROCHLORPERAZINE EDISYLATE 5 MG/ML
5 INJECTION INTRAMUSCULAR; INTRAVENOUS ONCE AS NEEDED
Status: DISCONTINUED | OUTPATIENT
Start: 2024-12-05 | End: 2024-12-05 | Stop reason: HOSPADM

## 2024-12-05 RX ADMIN — SODIUM CHLORIDE 152 MG: 0.9 INJECTION, SOLUTION INTRAVENOUS at 10:12

## 2024-12-05 RX ADMIN — PEGFILGRASTIM 6 MG: KIT SUBCUTANEOUS at 11:12

## 2024-12-05 RX ADMIN — SODIUM CHLORIDE: 9 INJECTION, SOLUTION INTRAVENOUS at 10:12

## 2024-12-05 RX ADMIN — HEPARIN 500 UNITS: 100 SYRINGE at 11:12

## 2024-12-05 NOTE — PLAN OF CARE
C3 D3 Etoposide/Neulasta OBI given. Tolerated well. Next appts given to pt. Dc'd home in stable condition.

## 2024-12-13 ENCOUNTER — INFUSION (OUTPATIENT)
Dept: INFUSION THERAPY | Facility: HOSPITAL | Age: 79
End: 2024-12-13
Attending: STUDENT IN AN ORGANIZED HEALTH CARE EDUCATION/TRAINING PROGRAM
Payer: MEDICARE

## 2024-12-13 VITALS
TEMPERATURE: 98 F | RESPIRATION RATE: 20 BRPM | HEART RATE: 69 BPM | SYSTOLIC BLOOD PRESSURE: 112 MMHG | DIASTOLIC BLOOD PRESSURE: 69 MMHG

## 2024-12-13 DIAGNOSIS — C7A.1 SMALL CELL NEUROENDOCRINE CARCINOMA OF LUNG: Primary | ICD-10-CM

## 2024-12-13 PROCEDURE — 63600175 PHARM REV CODE 636 W HCPCS: Performed by: STUDENT IN AN ORGANIZED HEALTH CARE EDUCATION/TRAINING PROGRAM

## 2024-12-13 PROCEDURE — 96523 IRRIG DRUG DELIVERY DEVICE: CPT

## 2024-12-13 PROCEDURE — 25000003 PHARM REV CODE 250: Performed by: STUDENT IN AN ORGANIZED HEALTH CARE EDUCATION/TRAINING PROGRAM

## 2024-12-13 PROCEDURE — A4216 STERILE WATER/SALINE, 10 ML: HCPCS | Performed by: STUDENT IN AN ORGANIZED HEALTH CARE EDUCATION/TRAINING PROGRAM

## 2024-12-13 RX ORDER — HEPARIN 100 UNIT/ML
500 SYRINGE INTRAVENOUS
OUTPATIENT
Start: 2024-12-13

## 2024-12-13 RX ORDER — SODIUM CHLORIDE 0.9 % (FLUSH) 0.9 %
10 SYRINGE (ML) INJECTION
Status: COMPLETED | OUTPATIENT
Start: 2024-12-13 | End: 2024-12-13

## 2024-12-13 RX ORDER — SODIUM CHLORIDE 0.9 % (FLUSH) 0.9 %
10 SYRINGE (ML) INJECTION
OUTPATIENT
Start: 2024-12-13

## 2024-12-13 RX ORDER — HEPARIN 100 UNIT/ML
500 SYRINGE INTRAVENOUS
Status: DISCONTINUED | OUTPATIENT
Start: 2024-12-13 | End: 2024-12-13 | Stop reason: HOSPADM

## 2024-12-13 RX ORDER — SODIUM CHLORIDE 0.9 % (FLUSH) 0.9 %
10 SYRINGE (ML) INJECTION
Status: DISCONTINUED | OUTPATIENT
Start: 2024-12-13 | End: 2024-12-13 | Stop reason: HOSPADM

## 2024-12-13 RX ORDER — HEPARIN 100 UNIT/ML
500 SYRINGE INTRAVENOUS
Status: COMPLETED | OUTPATIENT
Start: 2024-12-13 | End: 2024-12-13

## 2024-12-13 RX ADMIN — HEPARIN 500 UNITS: 100 SYRINGE at 09:12

## 2024-12-13 RX ADMIN — SODIUM CHLORIDE, PRESERVATIVE FREE 10 ML: 5 INJECTION INTRAVENOUS at 09:12

## 2024-12-13 NOTE — PLAN OF CARE
PICC care performed per sterile technique (QW); tolerated well; next appointments discussed with patient; discharged home in stable condition.

## 2024-12-19 ENCOUNTER — LAB VISIT (OUTPATIENT)
Dept: LAB | Facility: HOSPITAL | Age: 79
End: 2024-12-19
Attending: STUDENT IN AN ORGANIZED HEALTH CARE EDUCATION/TRAINING PROGRAM
Payer: MEDICARE

## 2024-12-19 ENCOUNTER — OFFICE VISIT (OUTPATIENT)
Dept: HEMATOLOGY/ONCOLOGY | Facility: CLINIC | Age: 79
End: 2024-12-19
Payer: MEDICARE

## 2024-12-19 ENCOUNTER — INFUSION (OUTPATIENT)
Dept: INFUSION THERAPY | Facility: HOSPITAL | Age: 79
End: 2024-12-19
Attending: STUDENT IN AN ORGANIZED HEALTH CARE EDUCATION/TRAINING PROGRAM
Payer: MEDICARE

## 2024-12-19 VITALS
OXYGEN SATURATION: 92 % | DIASTOLIC BLOOD PRESSURE: 72 MMHG | SYSTOLIC BLOOD PRESSURE: 110 MMHG | RESPIRATION RATE: 18 BRPM | TEMPERATURE: 98 F | HEART RATE: 68 BPM

## 2024-12-19 VITALS
HEART RATE: 71 BPM | BODY MASS INDEX: 31.54 KG/M2 | DIASTOLIC BLOOD PRESSURE: 73 MMHG | WEIGHT: 178 LBS | HEIGHT: 63 IN | SYSTOLIC BLOOD PRESSURE: 113 MMHG | OXYGEN SATURATION: 92 % | TEMPERATURE: 98 F | RESPIRATION RATE: 18 BRPM

## 2024-12-19 DIAGNOSIS — C7A.1 SMALL CELL NEUROENDOCRINE CARCINOMA OF LUNG: Primary | ICD-10-CM

## 2024-12-19 DIAGNOSIS — Z79.899 IMMUNODEFICIENCY DUE TO DRUG THERAPY: ICD-10-CM

## 2024-12-19 DIAGNOSIS — C7A.1 SMALL CELL NEUROENDOCRINE CARCINOMA OF LUNG: ICD-10-CM

## 2024-12-19 DIAGNOSIS — Z79.899 LONG-TERM CURRENT USE OF HIGH RISK MEDICATION OTHER THAN ANTICOAGULANT: ICD-10-CM

## 2024-12-19 DIAGNOSIS — D84.821 IMMUNODEFICIENCY DUE TO DRUG THERAPY: ICD-10-CM

## 2024-12-19 LAB
ALBUMIN SERPL-MCNC: 3.5 G/DL (ref 3.4–4.8)
ALBUMIN/GLOB SERPL: 1.3 RATIO (ref 1.1–2)
ALP SERPL-CCNC: 141 UNIT/L (ref 40–150)
ALT SERPL-CCNC: 10 UNIT/L (ref 0–55)
ANION GAP SERPL CALC-SCNC: 8 MEQ/L
AST SERPL-CCNC: 14 UNIT/L (ref 5–34)
BASOPHILS # BLD AUTO: 0.08 X10(3)/MCL
BASOPHILS NFR BLD AUTO: 0.4 %
BILIRUB SERPL-MCNC: 0.3 MG/DL
BUN SERPL-MCNC: 7.8 MG/DL (ref 8.4–25.7)
CALCIUM SERPL-MCNC: 9.1 MG/DL (ref 8.8–10)
CHLORIDE SERPL-SCNC: 107 MMOL/L (ref 98–107)
CO2 SERPL-SCNC: 27 MMOL/L (ref 23–31)
CREAT SERPL-MCNC: 0.69 MG/DL (ref 0.72–1.25)
CREAT/UREA NIT SERPL: 11
EOSINOPHIL # BLD AUTO: 0.43 X10(3)/MCL (ref 0–0.9)
EOSINOPHIL NFR BLD AUTO: 1.9 %
ERYTHROCYTE [DISTWIDTH] IN BLOOD BY AUTOMATED COUNT: 20.1 % (ref 11.5–17)
GFR SERPLBLD CREATININE-BSD FMLA CKD-EPI: >60 ML/MIN/1.73/M2
GLOBULIN SER-MCNC: 2.8 GM/DL (ref 2.4–3.5)
GLUCOSE SERPL-MCNC: 97 MG/DL (ref 82–115)
HCT VFR BLD AUTO: 33.1 % (ref 42–52)
HGB BLD-MCNC: 10.7 G/DL (ref 14–18)
IMM GRANULOCYTES # BLD AUTO: 5.83 X10(3)/MCL (ref 0–0.04)
IMM GRANULOCYTES NFR BLD AUTO: 26.4 %
LYMPHOCYTES # BLD AUTO: 1.99 X10(3)/MCL (ref 0.6–4.6)
LYMPHOCYTES NFR BLD AUTO: 9 %
MCH RBC QN AUTO: 29.6 PG (ref 27–31)
MCHC RBC AUTO-ENTMCNC: 32.3 G/DL (ref 33–36)
MCV RBC AUTO: 91.4 FL (ref 80–94)
MONOCYTES # BLD AUTO: 0.97 X10(3)/MCL (ref 0.1–1.3)
MONOCYTES NFR BLD AUTO: 4.4 %
NEUTROPHILS # BLD AUTO: 12.81 X10(3)/MCL (ref 2.1–9.2)
NEUTROPHILS NFR BLD AUTO: 57.9 %
PLATELET # BLD AUTO: 126 X10(3)/MCL (ref 130–400)
PMV BLD AUTO: 8.8 FL (ref 7.4–10.4)
POTASSIUM SERPL-SCNC: 3.6 MMOL/L (ref 3.5–5.1)
PROT SERPL-MCNC: 6.3 GM/DL (ref 5.8–7.6)
RBC # BLD AUTO: 3.62 X10(6)/MCL (ref 4.7–6.1)
SODIUM SERPL-SCNC: 142 MMOL/L (ref 136–145)
TSH SERPL-ACNC: 0.9 UIU/ML (ref 0.35–4.94)
WBC # BLD AUTO: 22.11 X10(3)/MCL (ref 4.5–11.5)

## 2024-12-19 PROCEDURE — 96523 IRRIG DRUG DELIVERY DEVICE: CPT

## 2024-12-19 PROCEDURE — 84443 ASSAY THYROID STIM HORMONE: CPT

## 2024-12-19 PROCEDURE — 80053 COMPREHEN METABOLIC PANEL: CPT

## 2024-12-19 PROCEDURE — 36415 COLL VENOUS BLD VENIPUNCTURE: CPT

## 2024-12-19 PROCEDURE — 99215 OFFICE O/P EST HI 40 MIN: CPT | Mod: S$PBB,,,

## 2024-12-19 PROCEDURE — 85025 COMPLETE CBC W/AUTO DIFF WBC: CPT

## 2024-12-19 PROCEDURE — 99999 PR PBB SHADOW E&M-EST. PATIENT-LVL IV: CPT | Mod: PBBFAC,,,

## 2024-12-19 PROCEDURE — 99214 OFFICE O/P EST MOD 30 MIN: CPT | Mod: PBBFAC,25

## 2024-12-19 RX ORDER — DIPHENHYDRAMINE HYDROCHLORIDE 50 MG/ML
50 INJECTION INTRAMUSCULAR; INTRAVENOUS ONCE AS NEEDED
OUTPATIENT
Start: 2024-12-25

## 2024-12-19 RX ORDER — HEPARIN 100 UNIT/ML
500 SYRINGE INTRAVENOUS
Status: DISCONTINUED | OUTPATIENT
Start: 2024-12-19 | End: 2024-12-19 | Stop reason: HOSPADM

## 2024-12-19 RX ORDER — DIPHENHYDRAMINE HYDROCHLORIDE 50 MG/ML
50 INJECTION INTRAMUSCULAR; INTRAVENOUS ONCE AS NEEDED
OUTPATIENT
Start: 2024-12-24

## 2024-12-19 RX ORDER — SODIUM CHLORIDE 0.9 % (FLUSH) 0.9 %
10 SYRINGE (ML) INJECTION
OUTPATIENT
Start: 2024-12-19

## 2024-12-19 RX ORDER — HEPARIN 100 UNIT/ML
500 SYRINGE INTRAVENOUS
OUTPATIENT
Start: 2024-12-19

## 2024-12-19 RX ORDER — PROCHLORPERAZINE EDISYLATE 5 MG/ML
5 INJECTION INTRAMUSCULAR; INTRAVENOUS ONCE AS NEEDED
OUTPATIENT
Start: 2024-12-26

## 2024-12-19 RX ORDER — SODIUM CHLORIDE 0.9 % (FLUSH) 0.9 %
10 SYRINGE (ML) INJECTION
Status: DISCONTINUED | OUTPATIENT
Start: 2024-12-19 | End: 2024-12-19 | Stop reason: HOSPADM

## 2024-12-19 RX ORDER — PROCHLORPERAZINE EDISYLATE 5 MG/ML
5 INJECTION INTRAMUSCULAR; INTRAVENOUS ONCE AS NEEDED
OUTPATIENT
Start: 2024-12-25

## 2024-12-19 RX ORDER — HEPARIN 100 UNIT/ML
500 SYRINGE INTRAVENOUS
OUTPATIENT
Start: 2024-12-24

## 2024-12-19 RX ORDER — DIPHENHYDRAMINE HYDROCHLORIDE 50 MG/ML
50 INJECTION INTRAMUSCULAR; INTRAVENOUS ONCE AS NEEDED
OUTPATIENT
Start: 2024-12-26

## 2024-12-19 RX ORDER — EPINEPHRINE 0.3 MG/.3ML
0.3 INJECTION SUBCUTANEOUS ONCE AS NEEDED
OUTPATIENT
Start: 2024-12-25

## 2024-12-19 RX ORDER — HEPARIN 100 UNIT/ML
500 SYRINGE INTRAVENOUS
OUTPATIENT
Start: 2024-12-26

## 2024-12-19 RX ORDER — PROCHLORPERAZINE EDISYLATE 5 MG/ML
5 INJECTION INTRAMUSCULAR; INTRAVENOUS ONCE AS NEEDED
OUTPATIENT
Start: 2024-12-24

## 2024-12-19 RX ORDER — SODIUM CHLORIDE 0.9 % (FLUSH) 0.9 %
10 SYRINGE (ML) INJECTION
OUTPATIENT
Start: 2024-12-25

## 2024-12-19 RX ORDER — HEPARIN 100 UNIT/ML
500 SYRINGE INTRAVENOUS
OUTPATIENT
Start: 2024-12-25

## 2024-12-19 RX ORDER — SODIUM CHLORIDE 0.9 % (FLUSH) 0.9 %
10 SYRINGE (ML) INJECTION
OUTPATIENT
Start: 2024-12-26

## 2024-12-19 RX ORDER — EPINEPHRINE 0.3 MG/.3ML
0.3 INJECTION SUBCUTANEOUS ONCE AS NEEDED
OUTPATIENT
Start: 2024-12-24

## 2024-12-19 RX ORDER — EPINEPHRINE 0.3 MG/.3ML
0.3 INJECTION SUBCUTANEOUS ONCE AS NEEDED
OUTPATIENT
Start: 2024-12-26

## 2024-12-19 RX ORDER — SODIUM CHLORIDE 0.9 % (FLUSH) 0.9 %
10 SYRINGE (ML) INJECTION
OUTPATIENT
Start: 2024-12-24

## 2024-12-19 NOTE — PROGRESS NOTES
Subjective:       Patient ID: Gerald J Lejeune is a 79 y.o. male.    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 He has a 150+ pack year smoking history, quit 1 year prior to presentation, on 3-4L NC at baseline. He presented to [facility] in September '24 for new onset seizures. Workup was done which was unrevealing of etiology, but during that workup he was found to have large volume R pleural effusion, large mediastinal LAD, and R mainstem mucous plugging vs mass obstruction. He has had his R sided pleural effusion drained twice due to quick reaccumulation, cytology negative x2. He underwent EBUS on 10/2 where within the R mainstem bronchus a near complete occlusion due to fungating mass was noted. Biopsy was done with final pathology revealed grade 3 neuroendocrine carcinoma consistent with small cell lung cancer.     His diagnosis, staging, and prognosis. The NCCN guidelines when discussing treatment therapy were referenced. The common side effects of chemotherapy and immunotherapy in detail including fatigue, nausea, vomiting, constipation, diarrhea, and increased risk of infections were explained. His median OS with and without treatment was discussed.  The role of palliative care as a supplement to treatment to support him and his family with the symptoms of his cancer was also discussed. They were agreeable to proceed.     October '24 PET with evidence of extensive disease to R lobe with pleural effusion and hypermetabolic LAD. No evidence of disease outside of the chest. He have since transitioned into 1st line carboplatin AUC 4, Etoposide 80mg/m2, and Atezolizumab 1200mg Q3w x 4 cycles in the palliative setting then repeat PET to assess response. Chemotherapy dose reduced due to patients  age, comorbidities, and performance status.     Interval History:   Patient presents to clinic today for NP follow up appointment and labs for toxicity check prior to C4D1 Carbo/Etop/Atezolizumab on 12/24.  Overall he states he is doing okay today.  Today, he have no complaints.  Tolerating treatment using PICC line with no major issue.  Denies any worsening dyspnea.  Currently using oxygen PRN with O2 saturation below 90.  His energy level is improving; notes his wife has been motivating to move a little more.  Otherwise, he has no further complaints or concerns.    Past Medical History:   Diagnosis Date    Adenopathy     Anesthesia complication     takes longer to wake    Anxiety     BPH (benign prostatic hyperplasia)     COPD (chronic obstructive pulmonary disease)     Dementia     Depression     Hyperlipidemia     Hypertension     Obesity     Oxygen dependent     4L/NC    PAD (peripheral artery disease)     Post-obstructive pneumonia due to foreign body aspiration     Levaquin    Seizures     Shortness of breath     Sleep apnea     CPAP    Walker as ambulation aid       Past Surgical History:   Procedure Laterality Date    BELOW KNEE AMPUTATION OF LOWER EXTREMITY Right     BRONCHOSCOPY, WITH TRANSBRONCHIAL BIOPSY Right 10/2/2024    Procedure: BRONCHOSCOPY, WITH TRANSBRONCHIAL BIOPSY;  Surgeon: Lee Suarez Jr., MD, [facility];  Location: [facility] BRONCHOSCOPY LAB;  Service: Pulmonary;  Laterality: Right;  RUL    ENDOBRONCHIAL ULTRASOUND N/A 10/2/2024    Procedure: ENDOBRONCHIAL ULTRASOUND (EBUS)  with FLURO;  Surgeon: Lee Suarez Jr., MD, [facility];  Location: [facility] BRONCHOSCOPY LAB;  Service: Pulmonary;  Laterality: N/A;    INSERTION, STENT, BARE METAL, ARTERY, PERIPHERAL  2019    PERIPHERALLY INSERTED CENTRAL CATHETER INSERTION N/A 10/14/2024    Procedure: Insertion-picc;  Surgeon: Lejeune, Elizabeth Kennedy, MD;  Location: [facility] OR;  Service: Oncology;  Laterality: N/A;    THORACENTESIS  09/21/2024     Social History      Socioeconomic History    Marital status:    Tobacco Use    Smoking status: Former     Current packs/day: 0.00     Types: Cigarettes     Quit date: 2022     Years since quittin.8    Smokeless tobacco: Never   Substance and Sexual Activity    Alcohol use: Not Currently     Alcohol/week: 2.0 standard drinks of alcohol     Types: 2 Cans of beer per week    Drug use: Never    Sexual activity: Not Currently     Partners: Female     Birth control/protection: None     Social Drivers of Health     Financial Resource Strain: Low Risk  (11/10/2024)    Overall Financial Resource Strain (CARDIA)     Difficulty of Paying Living Expenses: Not hard at all   Food Insecurity: No Food Insecurity (11/10/2024)    Hunger Vital Sign     Worried About Running Out of Food in the Last Year: Never true     Ran Out of Food in the Last Year: Never true   Transportation Needs: No Transportation Needs (10/1/2024)    TRANSPORTATION NEEDS     Transportation : No   Physical Activity: Inactive (11/10/2024)    Exercise Vital Sign     Days of Exercise per Week: 0 days     Minutes of Exercise per Session: 10 min   Stress: No Stress Concern Present (11/10/2024)    [employer] - Occupational Stress Questionnaire     Feeling of Stress : Only a little   Housing Stability: Low Risk  (11/10/2024)    Housing Stability Vital Sign     Unable to Pay for Housing in the Last Year: No     Homeless in the Last Year: No      No family history on file.   Review of patient's allergies indicates:  No Known Allergies   Review of Systems   Constitutional:  Negative for activity change, appetite change, chills, fatigue, fever and unexpected weight change.   HENT:  Negative for mouth sores and sore throat.    Eyes:  Negative for visual disturbance.   Respiratory:  Negative for cough and shortness of breath.    Cardiovascular:  Negative for chest pain.   Gastrointestinal:  Negative for abdominal pain, constipation, diarrhea,  nausea and vomiting.   Endocrine: Negative for polyuria.   Genitourinary:  Negative for dysuria and frequency.   Musculoskeletal:  Negative for back pain.   Integumentary:  Negative for rash.   Allergic/Immunologic: Negative for frequent infections.   Neurological:  Negative for weakness and headaches.   Hematological:  Negative for adenopathy. Does not bruise/bleed easily.   Psychiatric/Behavioral:  The patient is nervous/anxious.          Objective:      Vitals:    12/19/24 1333   BP: 113/73   Pulse: 71   Resp: 18   Temp: 97.6 °F (36.4 °C)     Physical Exam  Constitutional:       General: He is not in acute distress.     Appearance: Normal appearance. He is ill-appearing.   HENT:      Head: Normocephalic and atraumatic.      Nose: Nose normal.      Mouth/Throat:      Mouth: Mucous membranes are moist.      Pharynx: Oropharynx is clear.   Eyes:      Extraocular Movements: Extraocular movements intact.      Conjunctiva/sclera: Conjunctivae normal.      Pupils: Pupils are equal, round, and reactive to light.   Cardiovascular:      Rate and Rhythm: Normal rate and regular rhythm.      Pulses: Normal pulses.      Heart sounds: Normal heart sounds. No murmur heard.  Pulmonary:      Effort: Pulmonary effort is normal. No respiratory distress.      Comments: 3L NC  Coarse minimal BS on R side  Abdominal:      General: There is no distension.      Palpations: Abdomen is soft.      Tenderness: There is no abdominal tenderness.   Musculoskeletal:         General: Deformity (R BKA) present. Normal range of motion.      Cervical back: Normal range of motion and neck supple.      Right lower leg: No edema.      Left lower leg: No edema.   Lymphadenopathy:      Cervical: No cervical adenopathy.   Skin:     General: Skin is warm and dry.   Neurological:      General: No focal deficit present.      Mental Status: He is alert and oriented to person, place, and time.       LABS AND IMAGING REVIEWED IN EPIC    IMAGING:  10/9/24  PET/CT:  Impression:  Large conglomerate right hilar/perihilar mass with moderate FDG avidity.  There are postobstructive changes.  Small right pleural effusion with FDG avid pleural nodule dependently at the right hemithorax.  Hypermetabolic right hilar, mediastinal and right supraclavicular lymph nodes.      PATHOLOGY:  10/2/24 EBUS:  FINAL DIAGNOSIS   1. LYMPH NODE 4R, EBUS-GUIDED BIOPSY:   METASTATIC SMALL CELL CARCINOMA (HIGH GRADE NEUROENDOCRINE CARCINOMA).   IMMUNOHISTOCHEMICAL STAINS PERFORMED WITH ADEQUATE CONTROLS:   KI-67, LOW MOLECULAR WEIGHT CYTOKERATIN, CD56, SYNAPTOPHYSIN, TTF-1 AND   CHROMOGRANIN:  INCONCLUSIVE DUE TO EXTENSIVE NECROSIS.      2. RIGHT LUNG, UPPER LOBE, BIOPSY:   HIGH GRADE NEUROENDOCRINE CARCINOMA (SMALL CELL CARCINOMA).   IMMUNOHISTOCHEMICAL STAINS PERFORMED WITH ADEQUATE CONTROLS:   CD56, LOW MOLECULAR WEIGHT CYTOKERATIN, SYNAPTOPHYSIN, CHROMOGRANIN AND TTF-1:   STRONGLY POSITIVE IN MALIGNANT CELLS.   KI-67:  SHOWS NEAR 100% NUCLEAR POSITIVITY IN MALIGNANT CELLS.     Assessment:   Extensive Stage R Lung SCLC - with recurrent pleural effusions and near obstructing R mainstem mass. Would be a candidate for palliative chemotherapy and would consider palliative radiation given the near occlusion of his mainstem if no immediate response.     His diagnosis, stage, and overall prognosis was discussed. We discussed the goals with therapy and without and that the goals of treatment were palliative. He does wish to proceed.     Immunodeficiency due to Drug Therapy - Precautions discussed with patient. Continue to monitor for any signs of symptoms of infection. No prophylaxis needed at this time. On growth factor with treatment.     Plan:   -Toxicity reviewed; okay to proceed with C3D1-3 of Carbo/Etop/Atezolizumab with growth factor on 12/24, 12/26, and 12/27 due to the holiday. Okay to use today's labs.   - Plan for repeat scan after C4; ordered today  - RTC 5 weeks for MD visit, labs same  day - CBC, CMP, TSH    Call Daughter with all appts/information      I spent a total of 40 minutes on the day of the visit.This includes face to face time and non-face to face time preparing to see the patient (eg, review of tests), obtaining and/or reviewing separately obtained history, documenting clinical information in the electronic or other health record, independently interpreting results and communicating results to the patient/family/caregiver, or care coordinator.    CLEMENTINA Don-ALBERTO  Hematology/Oncology   Cancer Center [facility]

## 2024-12-19 NOTE — NURSING
PICC dressing changed without difficulty, tolerated well. Discharged in stable condition and is aware of future appointments.

## 2024-12-23 RX ORDER — LEVETIRACETAM 500 MG/1
500 TABLET ORAL 2 TIMES DAILY
Qty: 180 TABLET | Refills: 0 | OUTPATIENT
Start: 2024-12-23 | End: 2025-03-23

## 2024-12-24 ENCOUNTER — INFUSION (OUTPATIENT)
Dept: INFUSION THERAPY | Facility: HOSPITAL | Age: 79
End: 2024-12-24
Attending: STUDENT IN AN ORGANIZED HEALTH CARE EDUCATION/TRAINING PROGRAM
Payer: MEDICARE

## 2024-12-24 VITALS
HEIGHT: 63 IN | WEIGHT: 181.19 LBS | BODY MASS INDEX: 32.11 KG/M2 | OXYGEN SATURATION: 96 % | HEART RATE: 71 BPM | SYSTOLIC BLOOD PRESSURE: 133 MMHG | DIASTOLIC BLOOD PRESSURE: 76 MMHG | TEMPERATURE: 98 F | RESPIRATION RATE: 16 BRPM

## 2024-12-24 DIAGNOSIS — C7A.1 SMALL CELL NEUROENDOCRINE CARCINOMA OF LUNG: Primary | ICD-10-CM

## 2024-12-24 PROCEDURE — 25000003 PHARM REV CODE 250

## 2024-12-24 PROCEDURE — 96413 CHEMO IV INFUSION 1 HR: CPT

## 2024-12-24 PROCEDURE — 96417 CHEMO IV INFUS EACH ADDL SEQ: CPT

## 2024-12-24 PROCEDURE — 96367 TX/PROPH/DG ADDL SEQ IV INF: CPT

## 2024-12-24 PROCEDURE — 63600175 PHARM REV CODE 636 W HCPCS

## 2024-12-24 PROCEDURE — 96375 TX/PRO/DX INJ NEW DRUG ADDON: CPT

## 2024-12-24 RX ORDER — DIPHENHYDRAMINE HYDROCHLORIDE 50 MG/ML
50 INJECTION INTRAMUSCULAR; INTRAVENOUS ONCE AS NEEDED
Status: DISCONTINUED | OUTPATIENT
Start: 2024-12-24 | End: 2024-12-24 | Stop reason: HOSPADM

## 2024-12-24 RX ORDER — PROCHLORPERAZINE EDISYLATE 5 MG/ML
5 INJECTION INTRAMUSCULAR; INTRAVENOUS ONCE AS NEEDED
Status: DISCONTINUED | OUTPATIENT
Start: 2024-12-24 | End: 2024-12-24 | Stop reason: HOSPADM

## 2024-12-24 RX ORDER — SODIUM CHLORIDE 0.9 % (FLUSH) 0.9 %
10 SYRINGE (ML) INJECTION
Status: DISCONTINUED | OUTPATIENT
Start: 2024-12-24 | End: 2024-12-24 | Stop reason: HOSPADM

## 2024-12-24 RX ORDER — EPINEPHRINE 0.3 MG/.3ML
0.3 INJECTION SUBCUTANEOUS ONCE AS NEEDED
Status: DISCONTINUED | OUTPATIENT
Start: 2024-12-24 | End: 2024-12-24 | Stop reason: HOSPADM

## 2024-12-24 RX ORDER — HEPARIN 100 UNIT/ML
500 SYRINGE INTRAVENOUS
Status: DISCONTINUED | OUTPATIENT
Start: 2024-12-24 | End: 2024-12-24 | Stop reason: HOSPADM

## 2024-12-24 RX ADMIN — CARBOPLATIN 510 MG: 10 INJECTION, SOLUTION INTRAVENOUS at 11:12

## 2024-12-24 RX ADMIN — SODIUM CHLORIDE 152 MG: 9 INJECTION, SOLUTION INTRAVENOUS at 12:12

## 2024-12-24 RX ADMIN — PALONOSETRON HYDROCHLORIDE 0.25 MG: 0.25 INJECTION INTRAVENOUS at 10:12

## 2024-12-24 RX ADMIN — APREPITANT 130 MG: 130 INJECTION, EMULSION INTRAVENOUS at 10:12

## 2024-12-24 RX ADMIN — ATEZOLIZUMAB 1200 MG: 1200 INJECTION, SOLUTION INTRAVENOUS at 10:12

## 2024-12-26 ENCOUNTER — INFUSION (OUTPATIENT)
Dept: INFUSION THERAPY | Facility: HOSPITAL | Age: 79
End: 2024-12-26
Attending: STUDENT IN AN ORGANIZED HEALTH CARE EDUCATION/TRAINING PROGRAM
Payer: MEDICARE

## 2024-12-26 VITALS
HEART RATE: 72 BPM | RESPIRATION RATE: 20 BRPM | BODY MASS INDEX: 32.11 KG/M2 | OXYGEN SATURATION: 94 % | TEMPERATURE: 98 F | HEIGHT: 63 IN | DIASTOLIC BLOOD PRESSURE: 75 MMHG | SYSTOLIC BLOOD PRESSURE: 130 MMHG | WEIGHT: 181.19 LBS

## 2024-12-26 DIAGNOSIS — C7A.1 SMALL CELL NEUROENDOCRINE CARCINOMA OF LUNG: Primary | ICD-10-CM

## 2024-12-26 PROCEDURE — A4216 STERILE WATER/SALINE, 10 ML: HCPCS

## 2024-12-26 PROCEDURE — 96413 CHEMO IV INFUSION 1 HR: CPT

## 2024-12-26 PROCEDURE — 63600175 PHARM REV CODE 636 W HCPCS

## 2024-12-26 PROCEDURE — 25000003 PHARM REV CODE 250

## 2024-12-26 RX ORDER — SODIUM CHLORIDE 0.9 % (FLUSH) 0.9 %
10 SYRINGE (ML) INJECTION
Status: DISCONTINUED | OUTPATIENT
Start: 2024-12-26 | End: 2024-12-26 | Stop reason: HOSPADM

## 2024-12-26 RX ORDER — HEPARIN 100 UNIT/ML
500 SYRINGE INTRAVENOUS
Status: DISCONTINUED | OUTPATIENT
Start: 2024-12-26 | End: 2024-12-26 | Stop reason: HOSPADM

## 2024-12-26 RX ORDER — DIPHENHYDRAMINE HYDROCHLORIDE 50 MG/ML
50 INJECTION INTRAMUSCULAR; INTRAVENOUS ONCE AS NEEDED
Status: DISCONTINUED | OUTPATIENT
Start: 2024-12-26 | End: 2024-12-26 | Stop reason: HOSPADM

## 2024-12-26 RX ORDER — EPINEPHRINE 0.3 MG/.3ML
0.3 INJECTION SUBCUTANEOUS ONCE AS NEEDED
Status: DISCONTINUED | OUTPATIENT
Start: 2024-12-26 | End: 2024-12-26 | Stop reason: HOSPADM

## 2024-12-26 RX ORDER — PROCHLORPERAZINE EDISYLATE 5 MG/ML
5 INJECTION INTRAMUSCULAR; INTRAVENOUS ONCE AS NEEDED
Status: DISCONTINUED | OUTPATIENT
Start: 2024-12-26 | End: 2024-12-26 | Stop reason: HOSPADM

## 2024-12-26 RX ADMIN — SODIUM CHLORIDE: 9 INJECTION, SOLUTION INTRAVENOUS at 10:12

## 2024-12-26 RX ADMIN — SODIUM CHLORIDE, PRESERVATIVE FREE 10 ML: 5 INJECTION INTRAVENOUS at 11:12

## 2024-12-26 RX ADMIN — HEPARIN 500 UNITS: 100 SYRINGE at 11:12

## 2024-12-26 RX ADMIN — ETOPOSIDE 152 MG: 20 INJECTION, SOLUTION INTRAVENOUS at 10:12

## 2024-12-26 NOTE — NURSING
C4 D2 Etoposide given, tolerated well. Pt discharged home in stable condition, future appts given.

## 2024-12-27 ENCOUNTER — INFUSION (OUTPATIENT)
Dept: INFUSION THERAPY | Facility: HOSPITAL | Age: 79
End: 2024-12-27
Attending: STUDENT IN AN ORGANIZED HEALTH CARE EDUCATION/TRAINING PROGRAM
Payer: MEDICARE

## 2024-12-27 VITALS
RESPIRATION RATE: 20 BRPM | DIASTOLIC BLOOD PRESSURE: 60 MMHG | HEIGHT: 63 IN | BODY MASS INDEX: 32.11 KG/M2 | OXYGEN SATURATION: 94 % | WEIGHT: 181.19 LBS | TEMPERATURE: 98 F | HEART RATE: 72 BPM | SYSTOLIC BLOOD PRESSURE: 132 MMHG

## 2024-12-27 DIAGNOSIS — C7A.1 SMALL CELL NEUROENDOCRINE CARCINOMA OF LUNG: Primary | ICD-10-CM

## 2024-12-27 PROCEDURE — 96413 CHEMO IV INFUSION 1 HR: CPT

## 2024-12-27 PROCEDURE — 63600175 PHARM REV CODE 636 W HCPCS

## 2024-12-27 PROCEDURE — 96377 APPLICATON ON-BODY INJECTOR: CPT

## 2024-12-27 PROCEDURE — A4216 STERILE WATER/SALINE, 10 ML: HCPCS

## 2024-12-27 PROCEDURE — 25000003 PHARM REV CODE 250

## 2024-12-27 RX ORDER — DIPHENHYDRAMINE HYDROCHLORIDE 50 MG/ML
50 INJECTION INTRAMUSCULAR; INTRAVENOUS ONCE AS NEEDED
Status: DISCONTINUED | OUTPATIENT
Start: 2024-12-27 | End: 2024-12-27 | Stop reason: HOSPADM

## 2024-12-27 RX ORDER — EPINEPHRINE 0.3 MG/.3ML
0.3 INJECTION SUBCUTANEOUS ONCE AS NEEDED
Status: DISCONTINUED | OUTPATIENT
Start: 2024-12-27 | End: 2024-12-27 | Stop reason: HOSPADM

## 2024-12-27 RX ORDER — HEPARIN 100 UNIT/ML
500 SYRINGE INTRAVENOUS
Status: DISCONTINUED | OUTPATIENT
Start: 2024-12-27 | End: 2024-12-27 | Stop reason: HOSPADM

## 2024-12-27 RX ORDER — SODIUM CHLORIDE 0.9 % (FLUSH) 0.9 %
10 SYRINGE (ML) INJECTION
Status: DISCONTINUED | OUTPATIENT
Start: 2024-12-27 | End: 2024-12-27 | Stop reason: HOSPADM

## 2024-12-27 RX ORDER — PROCHLORPERAZINE EDISYLATE 5 MG/ML
5 INJECTION INTRAMUSCULAR; INTRAVENOUS ONCE AS NEEDED
Status: DISCONTINUED | OUTPATIENT
Start: 2024-12-27 | End: 2024-12-27 | Stop reason: HOSPADM

## 2024-12-27 RX ADMIN — PEGFILGRASTIM 6 MG: KIT SUBCUTANEOUS at 12:12

## 2024-12-27 RX ADMIN — HEPARIN 500 UNITS: 100 SYRINGE at 12:12

## 2024-12-27 RX ADMIN — SODIUM CHLORIDE, PRESERVATIVE FREE 10 ML: 5 INJECTION INTRAVENOUS at 12:12

## 2024-12-27 RX ADMIN — SODIUM CHLORIDE: 9 INJECTION, SOLUTION INTRAVENOUS at 10:12

## 2024-12-27 RX ADMIN — ETOPOSIDE 152 MG: 20 INJECTION INTRAVENOUS at 11:12

## 2024-12-27 NOTE — NURSING
C4 D3 Etoposide + Neulasta OBI given, tolerated well. Pt discharged home in stable condition, aware of future appts.

## 2024-12-30 ENCOUNTER — TELEPHONE (OUTPATIENT)
Dept: PALLIATIVE MEDICINE | Facility: CLINIC | Age: 79
End: 2024-12-30
Payer: MEDICARE

## 2024-12-30 NOTE — TELEPHONE ENCOUNTER
"Patient's granddaughter Consuelo called stating"  I would like to keep Palliative as an as needed base and not a continuing appointment. He is s doing well with chemo and his symptoms are being managed well by Oncology. I don't think he needs a "end of life"services at this time." I informed Consuelo that Palliative Medicine is not an "end of life service" that's more of Hospice. However, I did explain that Palliative Medicine is an extra layer of support that helps with Goals of care discission base on patient's quality of life  and symptom management. We do provide patient's with additional  resources as needed. Consuelo states" Thank you for that clarification because I thought Palliative was a hospice service. We would just like to come as needed and not a regular base. Can we please just cancel is follow up appointment for now and I can call to schedule whenever." Patient was schedule to be seen in clinic on Wednesday Mushtaq 15,2025, appointment was canceled.   "

## 2025-01-03 ENCOUNTER — INFUSION (OUTPATIENT)
Dept: INFUSION THERAPY | Facility: HOSPITAL | Age: 80
End: 2025-01-03
Attending: STUDENT IN AN ORGANIZED HEALTH CARE EDUCATION/TRAINING PROGRAM
Payer: MEDICARE

## 2025-01-03 DIAGNOSIS — C7A.1 SMALL CELL NEUROENDOCRINE CARCINOMA OF LUNG: Primary | ICD-10-CM

## 2025-01-03 PROCEDURE — A4216 STERILE WATER/SALINE, 10 ML: HCPCS | Performed by: STUDENT IN AN ORGANIZED HEALTH CARE EDUCATION/TRAINING PROGRAM

## 2025-01-03 PROCEDURE — 63600175 PHARM REV CODE 636 W HCPCS: Performed by: STUDENT IN AN ORGANIZED HEALTH CARE EDUCATION/TRAINING PROGRAM

## 2025-01-03 PROCEDURE — 25000003 PHARM REV CODE 250: Performed by: STUDENT IN AN ORGANIZED HEALTH CARE EDUCATION/TRAINING PROGRAM

## 2025-01-03 PROCEDURE — 96523 IRRIG DRUG DELIVERY DEVICE: CPT

## 2025-01-03 RX ORDER — SODIUM CHLORIDE 0.9 % (FLUSH) 0.9 %
10 SYRINGE (ML) INJECTION
Status: COMPLETED | OUTPATIENT
Start: 2025-01-03 | End: 2025-01-03

## 2025-01-03 RX ORDER — HEPARIN 100 UNIT/ML
500 SYRINGE INTRAVENOUS
OUTPATIENT
Start: 2025-01-03

## 2025-01-03 RX ORDER — HEPARIN 100 UNIT/ML
500 SYRINGE INTRAVENOUS
Status: COMPLETED | OUTPATIENT
Start: 2025-01-03 | End: 2025-01-03

## 2025-01-03 RX ORDER — SODIUM CHLORIDE 0.9 % (FLUSH) 0.9 %
10 SYRINGE (ML) INJECTION
OUTPATIENT
Start: 2025-01-03

## 2025-01-03 RX ADMIN — Medication 10 ML: at 09:01

## 2025-01-03 RX ADMIN — HEPARIN 500 UNITS: 100 SYRINGE at 09:01

## 2025-01-07 ENCOUNTER — INFUSION (OUTPATIENT)
Dept: INFUSION THERAPY | Facility: HOSPITAL | Age: 80
End: 2025-01-07
Attending: STUDENT IN AN ORGANIZED HEALTH CARE EDUCATION/TRAINING PROGRAM
Payer: MEDICARE

## 2025-01-07 VITALS
OXYGEN SATURATION: 95 % | SYSTOLIC BLOOD PRESSURE: 124 MMHG | DIASTOLIC BLOOD PRESSURE: 63 MMHG | RESPIRATION RATE: 22 BRPM | HEART RATE: 80 BPM | TEMPERATURE: 98 F

## 2025-01-07 DIAGNOSIS — C7A.1 SMALL CELL NEUROENDOCRINE CARCINOMA OF LUNG: Primary | ICD-10-CM

## 2025-01-07 PROCEDURE — 63600175 PHARM REV CODE 636 W HCPCS: Performed by: STUDENT IN AN ORGANIZED HEALTH CARE EDUCATION/TRAINING PROGRAM

## 2025-01-07 PROCEDURE — 96523 IRRIG DRUG DELIVERY DEVICE: CPT

## 2025-01-07 RX ORDER — HEPARIN 100 UNIT/ML
500 SYRINGE INTRAVENOUS
OUTPATIENT
Start: 2025-01-07

## 2025-01-07 RX ORDER — HEPARIN 100 UNIT/ML
500 SYRINGE INTRAVENOUS
Status: COMPLETED | OUTPATIENT
Start: 2025-01-07 | End: 2025-01-07

## 2025-01-07 RX ORDER — SODIUM CHLORIDE 0.9 % (FLUSH) 0.9 %
10 SYRINGE (ML) INJECTION
Status: DISCONTINUED | OUTPATIENT
Start: 2025-01-07 | End: 2025-01-07 | Stop reason: HOSPADM

## 2025-01-07 RX ORDER — SODIUM CHLORIDE 0.9 % (FLUSH) 0.9 %
10 SYRINGE (ML) INJECTION
OUTPATIENT
Start: 2025-01-07

## 2025-01-07 RX ADMIN — HEPARIN 500 UNITS: 100 SYRINGE at 09:01

## 2025-01-10 ENCOUNTER — HOSPITAL ENCOUNTER (OUTPATIENT)
Dept: RADIOLOGY | Facility: HOSPITAL | Age: 80
Discharge: HOME | End: 2025-01-10
Payer: MEDICARE

## 2025-01-10 DIAGNOSIS — C7A.1 SMALL CELL NEUROENDOCRINE CARCINOMA OF LUNG: ICD-10-CM

## 2025-01-10 PROCEDURE — 70551 MRI BRAIN STEM W/O DYE: CPT | Mod: TC

## 2025-01-14 ENCOUNTER — HOSPITAL ENCOUNTER (OUTPATIENT)
Dept: RADIOLOGY | Facility: HOSPITAL | Age: 80
Discharge: HOME | End: 2025-01-14
Payer: MEDICARE

## 2025-01-14 ENCOUNTER — INFUSION (OUTPATIENT)
Dept: INFUSION THERAPY | Facility: HOSPITAL | Age: 80
End: 2025-01-14
Attending: STUDENT IN AN ORGANIZED HEALTH CARE EDUCATION/TRAINING PROGRAM
Payer: MEDICARE

## 2025-01-14 VITALS
HEART RATE: 82 BPM | TEMPERATURE: 98 F | RESPIRATION RATE: 18 BRPM | OXYGEN SATURATION: 91 % | DIASTOLIC BLOOD PRESSURE: 77 MMHG | SYSTOLIC BLOOD PRESSURE: 147 MMHG

## 2025-01-14 DIAGNOSIS — C7A.1 SMALL CELL NEUROENDOCRINE CARCINOMA OF LUNG: Primary | ICD-10-CM

## 2025-01-14 DIAGNOSIS — C7A.1 SMALL CELL NEUROENDOCRINE CARCINOMA OF LUNG: ICD-10-CM

## 2025-01-14 PROCEDURE — 25000003 PHARM REV CODE 250: Performed by: STUDENT IN AN ORGANIZED HEALTH CARE EDUCATION/TRAINING PROGRAM

## 2025-01-14 PROCEDURE — 96523 IRRIG DRUG DELIVERY DEVICE: CPT

## 2025-01-14 PROCEDURE — 63600175 PHARM REV CODE 636 W HCPCS: Performed by: STUDENT IN AN ORGANIZED HEALTH CARE EDUCATION/TRAINING PROGRAM

## 2025-01-14 PROCEDURE — A4216 STERILE WATER/SALINE, 10 ML: HCPCS | Performed by: STUDENT IN AN ORGANIZED HEALTH CARE EDUCATION/TRAINING PROGRAM

## 2025-01-14 PROCEDURE — 78815 PET IMAGE W/CT SKULL-THIGH: CPT | Mod: TC

## 2025-01-14 PROCEDURE — A9552 F18 FDG: HCPCS

## 2025-01-14 RX ORDER — SODIUM CHLORIDE 0.9 % (FLUSH) 0.9 %
10 SYRINGE (ML) INJECTION
OUTPATIENT
Start: 2025-01-14

## 2025-01-14 RX ORDER — SODIUM CHLORIDE 0.9 % (FLUSH) 0.9 %
10 SYRINGE (ML) INJECTION
Status: COMPLETED | OUTPATIENT
Start: 2025-01-14 | End: 2025-01-14

## 2025-01-14 RX ORDER — FLUDEOXYGLUCOSE F18 500 MCI/ML
10 INJECTION INTRAVENOUS
Status: COMPLETED | OUTPATIENT
Start: 2025-01-14 | End: 2025-01-14

## 2025-01-14 RX ORDER — HEPARIN 100 UNIT/ML
500 SYRINGE INTRAVENOUS
OUTPATIENT
Start: 2025-01-14

## 2025-01-14 RX ORDER — HEPARIN 100 UNIT/ML
500 SYRINGE INTRAVENOUS
Status: COMPLETED | OUTPATIENT
Start: 2025-01-14 | End: 2025-01-14

## 2025-01-14 RX ADMIN — Medication 10 ML: at 02:01

## 2025-01-14 RX ADMIN — FLUDEOXYGLUCOSE F-18 10.4 MILLICURIE: 500 INJECTION INTRAVENOUS at 12:01

## 2025-01-14 RX ADMIN — HEPARIN 500 UNITS: 100 SYRINGE at 02:01

## 2025-01-30 NOTE — PROGRESS NOTES
Subjective:       Patient ID: Gerald J Lejeune is a 79 y.o. male.    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 He has a 150+ pack year smoking history, quit 1 year prior to presentation, on 3-4L NC at baseline. He presented to [facility] in September '24 for new onset seizures. Workup was done which was unrevealing of etiology, but during that workup he was found to have large volume R pleural effusion, large mediastinal LAD, and R mainstem mucous plugging vs mass obstruction. He has had his R sided pleural effusion drained twice due to quick reaccumulation, cytology negative x2. He underwent EBUS on 10/2 where within the R mainstem bronchus a near complete occlusion due to fungating mass was noted. Biopsy was done with final pathology revealed grade 3 neuroendocrine carcinoma consistent with small cell lung cancer.     His diagnosis, staging, and prognosis. The NCCN guidelines when discussing treatment therapy were referenced. The common side effects of chemotherapy and immunotherapy in detail including fatigue, nausea, vomiting, constipation, diarrhea, and increased risk of infections were explained. His median OS with and without treatment was discussed.  The role of palliative care as a supplement to treatment to support him and his family with the symptoms of his cancer was also discussed. They were agreeable to proceed.     October '24 PET with evidence of extensive disease to R lobe with pleural effusion and hypermetabolic LAD. No evidence of disease outside of the chest. He has since transitioned into 1st line carboplatin AUC 4, Etoposide 80mg/m2, and Atezolizumab 1200mg Q3w x 4 cycles in the palliative setting then repeat PET to assess response. Chemotherapy dose reduced due to patients  age, comorbidities, and performance status.     Interval History:   Patient presents to clinic today for MD follow up appointment and labs to discuss scan results and plan of treatment.  He is doing well since finishing his last cycle of chemotherapy. Tolerated last cycle without major issues.   Denies any nausea, vomiting, headaches. No shortness of breath. Occasional cough.   Rarely requires oxygen now.   Discussed scan results and role for continued immunotherapy. He is agreeable to proceed.       Past Medical History:   Diagnosis Date    Adenopathy     Anesthesia complication     takes longer to wake    Anxiety     BPH (benign prostatic hyperplasia)     COPD (chronic obstructive pulmonary disease)     Dementia     Depression     Hyperlipidemia     Hypertension     Obesity     Oxygen dependent     4L/NC    PAD (peripheral artery disease)     Post-obstructive pneumonia due to foreign body aspiration     Levaquin    Seizures     Shortness of breath     Sleep apnea     CPAP    Walker as ambulation aid       Past Surgical History:   Procedure Laterality Date    BELOW KNEE AMPUTATION OF LOWER EXTREMITY Right     BRONCHOSCOPY, WITH TRANSBRONCHIAL BIOPSY Right 10/2/2024    Procedure: BRONCHOSCOPY, WITH TRANSBRONCHIAL BIOPSY;  Surgeon: eLe Suarez Jr., MD, [facility];  Location: [facility] BRONCHOSCOPY LAB;  Service: Pulmonary;  Laterality: Right;  RUL    ENDOBRONCHIAL ULTRASOUND N/A 10/2/2024    Procedure: ENDOBRONCHIAL ULTRASOUND (EBUS)  with FLURO;  Surgeon: Lee Suarez Jr., MD, [facility];  Location: [facility] BRONCHOSCOPY LAB;  Service: Pulmonary;  Laterality: N/A;    INSERTION, STENT, BARE METAL, ARTERY, PERIPHERAL  2019    PERIPHERALLY INSERTED CENTRAL CATHETER INSERTION N/A 10/14/2024    Procedure: Insertion-picc;  Surgeon: Lejeune, Elizabeth Kennedy, MD;  Location: [facility] OR;  Service: Oncology;  Laterality: N/A;    THORACENTESIS  09/21/2024     Social History     Socioeconomic History    Marital status:    Tobacco Use     Smoking status: Former     Current packs/day: 0.00     Types: Cigarettes     Quit date: 2022     Years since quittin.9    Smokeless tobacco: Never   Substance and Sexual Activity    Alcohol use: Not Currently     Alcohol/week: 2.0 standard drinks of alcohol     Types: 2 Cans of beer per week    Drug use: Never    Sexual activity: Not Currently     Partners: Female     Birth control/protection: None     Social Drivers of Health     Financial Resource Strain: Low Risk  (11/10/2024)    Overall Financial Resource Strain (CARDIA)     Difficulty of Paying Living Expenses: Not hard at all   Food Insecurity: No Food Insecurity (11/10/2024)    Hunger Vital Sign     Worried About Running Out of Food in the Last Year: Never true     Ran Out of Food in the Last Year: Never true   Transportation Needs: No Transportation Needs (10/1/2024)    TRANSPORTATION NEEDS     Transportation : No   Physical Activity: Inactive (11/10/2024)    Exercise Vital Sign     Days of Exercise per Week: 0 days     Minutes of Exercise per Session: 10 min   Stress: No Stress Concern Present (11/10/2024)    [employer] - Occupational Stress Questionnaire     Feeling of Stress : Only a little   Housing Stability: Low Risk  (11/10/2024)    Housing Stability Vital Sign     Unable to Pay for Housing in the Last Year: No     Homeless in the Last Year: No      No family history on file.   Review of patient's allergies indicates:  No Known Allergies   Review of Systems   Constitutional:  Negative for activity change, appetite change, chills, fatigue, fever and unexpected weight change.   HENT:  Negative for mouth sores and sore throat.    Eyes:  Negative for visual disturbance.   Respiratory:  Negative for cough and shortness of breath.    Cardiovascular:  Negative for chest pain.   Gastrointestinal:  Negative for abdominal pain, constipation, diarrhea, nausea and vomiting.   Endocrine: Negative for polyuria.   Genitourinary:   Negative for dysuria and frequency.   Musculoskeletal:  Negative for back pain.   Integumentary:  Negative for rash.   Allergic/Immunologic: Negative for frequent infections.   Neurological:  Negative for weakness and headaches.   Hematological:  Negative for adenopathy. Does not bruise/bleed easily.   Psychiatric/Behavioral:  The patient is nervous/anxious.          Objective:      Vitals:    25 1253   BP: 121/72   Pulse: 72   Resp: 18   Temp: 97.4 °F (36.3 °C)       Physical Exam  Constitutional:       General: He is not in acute distress.     Appearance: Normal appearance. He is not ill-appearing.   HENT:      Head: Normocephalic and atraumatic.      Nose: Nose normal.      Mouth/Throat:      Mouth: Mucous membranes are moist.      Pharynx: Oropharynx is clear.   Eyes:      Extraocular Movements: Extraocular movements intact.      Conjunctiva/sclera: Conjunctivae normal.      Pupils: Pupils are equal, round, and reactive to light.   Cardiovascular:      Rate and Rhythm: Normal rate and regular rhythm.      Pulses: Normal pulses.      Heart sounds: Normal heart sounds. No murmur heard.  Pulmonary:      Effort: Pulmonary effort is normal. No respiratory distress.   Abdominal:      General: There is no distension.      Palpations: Abdomen is soft.      Tenderness: There is no abdominal tenderness.   Musculoskeletal:         General: Deformity (R BKA) present. Normal range of motion.      Cervical back: Normal range of motion and neck supple.      Right lower leg: No edema.      Left lower leg: No edema.   Lymphadenopathy:      Cervical: No cervical adenopathy.   Skin:     General: Skin is warm and dry.   Neurological:      General: No focal deficit present.      Mental Status: He is alert and oriented to person, place, and time.         LABS AND IMAGING REVIEWED IN EPIC    IMAGIN/10/25 MRI Brain:  Impression:  1. Generalized cerebral and cerebellar atrophy  2. Scattered foci of abnormal signal intensity  at the periventricular and subcortical white matter suspicious for foci of ischemia versus gliosis  3. Motion artifact  4. Patient refused intravenous contrast which limits the exam    1/14/25 PET/CT:  Impression:  1. Good treatment response with significantly improved right perihilar mass and mediastinal adenopathy.  2. Moderate right pleural effusion.      PATHOLOGY:  10/2/24 EBUS:  FINAL DIAGNOSIS   1. LYMPH NODE 4R, EBUS-GUIDED BIOPSY:   METASTATIC SMALL CELL CARCINOMA (HIGH GRADE NEUROENDOCRINE CARCINOMA).   IMMUNOHISTOCHEMICAL STAINS PERFORMED WITH ADEQUATE CONTROLS:   KI-67, LOW MOLECULAR WEIGHT CYTOKERATIN, CD56, SYNAPTOPHYSIN, TTF-1 AND   CHROMOGRANIN:  INCONCLUSIVE DUE TO EXTENSIVE NECROSIS.      2. RIGHT LUNG, UPPER LOBE, BIOPSY:   HIGH GRADE NEUROENDOCRINE CARCINOMA (SMALL CELL CARCINOMA).   IMMUNOHISTOCHEMICAL STAINS PERFORMED WITH ADEQUATE CONTROLS:   CD56, LOW MOLECULAR WEIGHT CYTOKERATIN, SYNAPTOPHYSIN, CHROMOGRANIN AND TTF-1:   STRONGLY POSITIVE IN MALIGNANT CELLS.   KI-67:  SHOWS NEAR 100% NUCLEAR POSITIVITY IN MALIGNANT CELLS.     Assessment:   Extensive Stage R Lung SCLC - with recurrent pleural effusions and near obstructing R mainstem mass. Treatment history as above. Good response to Carbo/Etop/Atezo, will now proceed with Atezolizumab alone.     Immunodeficiency due to Drug Therapy - Precautions discussed with patient. Continue to monitor for any signs of symptoms of infection. No prophylaxis needed at this time. On growth factor with treatment.     Plan:   - Scans reviewed; Good Response to chemotherapy  - Continue to follow Palliative as needed  - Will proceed with C5 of Atezo alone on 2/13, okay to use labs from today  - Referral to vascular surgery for mediport placement, okay if not done prior to C5  - Pull PICC today.   - RTC 3/4 or 3/5 for NP visit, labs same day - CBC, CMP, TSH prior to C6    Call Daughter with all appts/information    I spent a total of 40 minutes on the day of the  visit.This includes face to face time and non-face to face time preparing to see the patient (eg, review of tests), obtaining and/or reviewing separately obtained history, documenting clinical information in the electronic or other health record, independently interpreting results and communicating results to the patient/family/caregiver, or care coordinator.    Visit today included increased complexity associated with the care of the episodic problem Extensive Stage R Lung SCLC, addressing and managing the longitudinal care of the patient's Extensive Stage R Lung SCLC.     Elizabeth Lejeune, MD  Hematology/Oncology   [facility]    Professional Services   IYanelis LPN, acted solely as a scribe for and in the presence of Dr. Elizabeth Kennedy LeJeune, who performed these services.

## 2025-02-03 ENCOUNTER — OFFICE VISIT (OUTPATIENT)
Dept: HEMATOLOGY/ONCOLOGY | Facility: CLINIC | Age: 80
End: 2025-02-03
Payer: MEDICARE

## 2025-02-03 ENCOUNTER — INFUSION (OUTPATIENT)
Dept: INFUSION THERAPY | Facility: HOSPITAL | Age: 80
End: 2025-02-03
Attending: STUDENT IN AN ORGANIZED HEALTH CARE EDUCATION/TRAINING PROGRAM
Payer: MEDICARE

## 2025-02-03 ENCOUNTER — LAB VISIT (OUTPATIENT)
Dept: LAB | Facility: HOSPITAL | Age: 80
End: 2025-02-03
Attending: STUDENT IN AN ORGANIZED HEALTH CARE EDUCATION/TRAINING PROGRAM
Payer: MEDICARE

## 2025-02-03 VITALS
BODY MASS INDEX: 32.07 KG/M2 | HEART RATE: 72 BPM | DIASTOLIC BLOOD PRESSURE: 72 MMHG | RESPIRATION RATE: 18 BRPM | SYSTOLIC BLOOD PRESSURE: 121 MMHG | WEIGHT: 181 LBS | HEIGHT: 63 IN | OXYGEN SATURATION: 99 % | TEMPERATURE: 97 F

## 2025-02-03 DIAGNOSIS — Z79.899 LONG-TERM CURRENT USE OF HIGH RISK MEDICATION OTHER THAN ANTICOAGULANT: ICD-10-CM

## 2025-02-03 DIAGNOSIS — D84.821 IMMUNODEFICIENCY DUE TO DRUG THERAPY: ICD-10-CM

## 2025-02-03 DIAGNOSIS — Z79.899 IMMUNODEFICIENCY DUE TO DRUG THERAPY: ICD-10-CM

## 2025-02-03 DIAGNOSIS — C7A.1 SMALL CELL NEUROENDOCRINE CARCINOMA OF LUNG: Primary | ICD-10-CM

## 2025-02-03 DIAGNOSIS — C7A.1 SMALL CELL NEUROENDOCRINE CARCINOMA OF LUNG: ICD-10-CM

## 2025-02-03 LAB
ALBUMIN SERPL-MCNC: 3.8 G/DL (ref 3.4–4.8)
ALBUMIN/GLOB SERPL: 1.3 RATIO (ref 1.1–2)
ALP SERPL-CCNC: 74 UNIT/L (ref 40–150)
ALT SERPL-CCNC: 8 UNIT/L (ref 0–55)
ANION GAP SERPL CALC-SCNC: 7 MEQ/L
AST SERPL-CCNC: 12 UNIT/L (ref 5–34)
BASOPHILS # BLD AUTO: 0.03 X10(3)/MCL
BASOPHILS NFR BLD AUTO: 0.5 %
BILIRUB SERPL-MCNC: 0.5 MG/DL
BUN SERPL-MCNC: 7.9 MG/DL (ref 8.4–25.7)
CALCIUM SERPL-MCNC: 9.3 MG/DL (ref 8.8–10)
CHLORIDE SERPL-SCNC: 106 MMOL/L (ref 98–107)
CO2 SERPL-SCNC: 25 MMOL/L (ref 23–31)
CREAT SERPL-MCNC: 0.66 MG/DL (ref 0.72–1.25)
CREAT/UREA NIT SERPL: 12
EOSINOPHIL # BLD AUTO: 0 X10(3)/MCL (ref 0–0.9)
EOSINOPHIL NFR BLD AUTO: 0 %
ERYTHROCYTE [DISTWIDTH] IN BLOOD BY AUTOMATED COUNT: 16.9 % (ref 11.5–17)
GFR SERPLBLD CREATININE-BSD FMLA CKD-EPI: >60 ML/MIN/1.73/M2
GLOBULIN SER-MCNC: 3 GM/DL (ref 2.4–3.5)
GLUCOSE SERPL-MCNC: 96 MG/DL (ref 82–115)
HCT VFR BLD AUTO: 36.1 % (ref 42–52)
HGB BLD-MCNC: 11.9 G/DL (ref 14–18)
IMM GRANULOCYTES # BLD AUTO: 0.02 X10(3)/MCL (ref 0–0.04)
IMM GRANULOCYTES NFR BLD AUTO: 0.3 %
LYMPHOCYTES # BLD AUTO: 0.93 X10(3)/MCL (ref 0.6–4.6)
LYMPHOCYTES NFR BLD AUTO: 14.3 %
MCH RBC QN AUTO: 30.9 PG (ref 27–31)
MCHC RBC AUTO-ENTMCNC: 33 G/DL (ref 33–36)
MCV RBC AUTO: 93.8 FL (ref 80–94)
MONOCYTES # BLD AUTO: 0.47 X10(3)/MCL (ref 0.1–1.3)
MONOCYTES NFR BLD AUTO: 7.2 %
NEUTROPHILS # BLD AUTO: 5.06 X10(3)/MCL (ref 2.1–9.2)
NEUTROPHILS NFR BLD AUTO: 77.7 %
PLATELET # BLD AUTO: 123 X10(3)/MCL (ref 130–400)
PMV BLD AUTO: 10.1 FL (ref 7.4–10.4)
POTASSIUM SERPL-SCNC: 3.8 MMOL/L (ref 3.5–5.1)
PROT SERPL-MCNC: 6.8 GM/DL (ref 5.8–7.6)
RBC # BLD AUTO: 3.85 X10(6)/MCL (ref 4.7–6.1)
SODIUM SERPL-SCNC: 138 MMOL/L (ref 136–145)
TSH SERPL-ACNC: 0.8 UIU/ML (ref 0.35–4.94)
WBC # BLD AUTO: 6.51 X10(3)/MCL (ref 4.5–11.5)

## 2025-02-03 PROCEDURE — 84443 ASSAY THYROID STIM HORMONE: CPT

## 2025-02-03 PROCEDURE — 85025 COMPLETE CBC W/AUTO DIFF WBC: CPT

## 2025-02-03 PROCEDURE — 99215 OFFICE O/P EST HI 40 MIN: CPT | Mod: S$PBB,,, | Performed by: STUDENT IN AN ORGANIZED HEALTH CARE EDUCATION/TRAINING PROGRAM

## 2025-02-03 PROCEDURE — 99999 PR PBB SHADOW E&M-EST. PATIENT-LVL V: CPT | Mod: PBBFAC,,, | Performed by: STUDENT IN AN ORGANIZED HEALTH CARE EDUCATION/TRAINING PROGRAM

## 2025-02-03 PROCEDURE — 80053 COMPREHEN METABOLIC PANEL: CPT

## 2025-02-03 PROCEDURE — G2211 COMPLEX E/M VISIT ADD ON: HCPCS | Mod: S$PBB,,, | Performed by: STUDENT IN AN ORGANIZED HEALTH CARE EDUCATION/TRAINING PROGRAM

## 2025-02-03 PROCEDURE — 36415 COLL VENOUS BLD VENIPUNCTURE: CPT

## 2025-02-03 PROCEDURE — 99215 OFFICE O/P EST HI 40 MIN: CPT | Mod: PBBFAC | Performed by: STUDENT IN AN ORGANIZED HEALTH CARE EDUCATION/TRAINING PROGRAM

## 2025-02-03 RX ORDER — PROCHLORPERAZINE EDISYLATE 5 MG/ML
5 INJECTION INTRAMUSCULAR; INTRAVENOUS ONCE AS NEEDED
Status: CANCELLED | OUTPATIENT
Start: 2025-02-13

## 2025-02-03 RX ORDER — EPINEPHRINE 0.3 MG/.3ML
0.3 INJECTION SUBCUTANEOUS ONCE AS NEEDED
Status: CANCELLED | OUTPATIENT
Start: 2025-02-13

## 2025-02-03 RX ORDER — SODIUM CHLORIDE 0.9 % (FLUSH) 0.9 %
10 SYRINGE (ML) INJECTION
Status: CANCELLED | OUTPATIENT
Start: 2025-02-13

## 2025-02-03 RX ORDER — DIPHENHYDRAMINE HYDROCHLORIDE 50 MG/ML
50 INJECTION INTRAMUSCULAR; INTRAVENOUS ONCE AS NEEDED
Status: CANCELLED | OUTPATIENT
Start: 2025-02-13

## 2025-02-03 RX ORDER — HEPARIN 100 UNIT/ML
500 SYRINGE INTRAVENOUS
Status: CANCELLED | OUTPATIENT
Start: 2025-02-13

## 2025-02-05 ENCOUNTER — TELEPHONE (OUTPATIENT)
Dept: VASCULAR SURGERY | Facility: CLINIC | Age: 80
End: 2025-02-05
Payer: MEDICARE

## 2025-02-05 NOTE — TELEPHONE ENCOUNTER
Attempted to contact patients daughter to set patient up for mediport insertion. Unable to leave a voicemail. Will re attempt.

## 2025-02-06 ENCOUNTER — ANESTHESIA EVENT (OUTPATIENT)
Dept: SURGERY | Facility: HOSPITAL | Age: 80
End: 2025-02-06
Payer: MEDICARE

## 2025-02-06 DIAGNOSIS — C7A.1 SMALL CELL NEUROENDOCRINE CARCINOMA OF LUNG: Primary | ICD-10-CM

## 2025-02-06 DIAGNOSIS — C7A.1 MALIGNANT POORLY DIFFERENTIATED NEUROENDOCRINE CARCINOMA: ICD-10-CM

## 2025-02-06 RX ORDER — SODIUM CHLORIDE 9 MG/ML
INJECTION, SOLUTION INTRAVENOUS CONTINUOUS
Status: CANCELLED | OUTPATIENT
Start: 2025-02-06

## 2025-02-10 ENCOUNTER — TELEPHONE (OUTPATIENT)
Dept: VASCULAR SURGERY | Facility: CLINIC | Age: 80
End: 2025-02-10
Payer: MEDICARE

## 2025-02-10 NOTE — PRE-PROCEDURE INSTRUCTIONS
[facility]: Outpatient Surgery   Preprocedure Check-In Instructions       Your arrival time for your surgery will be given to you by your surgeon's office.    We ask patients to arrive about 2 hours before surgery to allow for enough time to review your health history & medications, start your IV, complete any outstanding labwork or tests, and meet your Anesthesiologist.     You will arrive at [facility], [address] [place] [address]. Enter through the [place] entrance next to the Emergency Room, and come to the 6th floor to the Outpatient Surgery Department. If you need a wheelchair, please call [phone] for an attendant to meet you at the [facility] entrance with a wheelchair.    Wait Times:  Due to inconsistent procedure completion times, an unexpected wait may occur.  The physicians would like you to be here in the event they run ahead of time.  We will keep you comfortable and informed while you are waiting.  We apologize in advance if this happens.    Visitory Policy:   You are allowed 2 adult visitors to be with you in the hospital. All hospital visitors should be in good current health. No small children.     What to Bring:   Please have your ID, insurance cards, and all home medication bottles with you at check in. Bring your CPAP machine if one is used at home.     Fasting:   Nothing to eat or drink after midnight the night before your procedure. This includes no ice, gum, hard candies, and/or tobacco products.   Follow your doctor's instructions for taking any medications on the morning of your procedure. If no instructions for taking medications were given, do not take any medications but bring your medications in their bottles to your procedure check in.     Follow your doctor's preoperative instructions regarding skin prep, bowel prep, bathing, or showering prior to your procedure. If any special soaps were provided to you, please use according to your  doctor's instructions. If no instructions were given from your doctor, take a good bath or shower with antibacterial soap the night before and the morning of your procedure. On the morning of procedure, wear loose, comfortable clothing. No lotions, makeup, perfumes, colognes, deodorant, or jewelry to your procedure. Removable items (glasses, contact lenses, dentures, retainers, hearing aids) need to be removed for your procedure. Bring your storage containers for these items if you wear them.     Artificial nails, body jewelry, eyelash extensions, and/or hair extensions with metal clips are not allowed during your surgery. If you currently wear any of these items, please arrange for them to be removed prior to your arrival to the hospital.     Outpatient or Same Day Surgeries:   Any patients receiving sedation/anesthesia are advised not to drive for 24 hours after their procedure. We do not allow patients to drive themselves home after discharge. If you are going home after your procedure, please have someone available to drive you home from the hospital.     You may call the Outpatient Surgery Department at [phone] with any questions or concerns. We are looking forward to meeting you and taking great care of you for your procedure. Thank you for choosing [facility] for your surgical needs.       Status: complete  Spoke with: patient's granddaughter  Call Time: [phone]

## 2025-02-10 NOTE — CLINICAL REVIEW
Plavix last dose 2/5/25. Xarelto last dose 2/7/25. labs reviewed. EKG/CXR ordered for am of sx. Echo noted. chart review complete. ccv

## 2025-02-11 ENCOUNTER — HOSPITAL ENCOUNTER (OUTPATIENT)
Facility: HOSPITAL | Age: 80
Discharge: HOME | End: 2025-02-11
Attending: SURGERY | Admitting: SURGERY
Payer: MEDICARE

## 2025-02-11 ENCOUNTER — ANESTHESIA (OUTPATIENT)
Dept: SURGERY | Facility: HOSPITAL | Age: 80
End: 2025-02-11
Payer: MEDICARE

## 2025-02-11 VITALS
TEMPERATURE: 97 F | RESPIRATION RATE: 20 BRPM | OXYGEN SATURATION: 94 % | DIASTOLIC BLOOD PRESSURE: 65 MMHG | WEIGHT: 175.06 LBS | HEART RATE: 77 BPM | BODY MASS INDEX: 31.02 KG/M2 | SYSTOLIC BLOOD PRESSURE: 135 MMHG | HEIGHT: 63 IN

## 2025-02-11 DIAGNOSIS — C7A.1 MALIGNANT POORLY DIFFERENTIATED NEUROENDOCRINE CARCINOMA: ICD-10-CM

## 2025-02-11 DIAGNOSIS — C7A.1 SMALL CELL NEUROENDOCRINE CARCINOMA OF LUNG: ICD-10-CM

## 2025-02-11 DIAGNOSIS — Z01.818 PRE-OP EXAMINATION: ICD-10-CM

## 2025-02-11 LAB
OHS QRS DURATION: 112 MS
OHS QTC CALCULATION: 419 MS

## 2025-02-11 PROCEDURE — C1788 PORT, INDWELLING, IMP: HCPCS | Performed by: SURGERY

## 2025-02-11 PROCEDURE — 63600175 PHARM REV CODE 636 W HCPCS: Performed by: NURSE ANESTHETIST, CERTIFIED REGISTERED

## 2025-02-11 PROCEDURE — 77001 FLUOROGUIDE FOR VEIN DEVICE: CPT | Mod: 26,,, | Performed by: SURGERY

## 2025-02-11 PROCEDURE — 93005 ELECTROCARDIOGRAM TRACING: CPT

## 2025-02-11 PROCEDURE — 37000009 HC ANESTHESIA EA ADD 15 MINS: Performed by: SURGERY

## 2025-02-11 PROCEDURE — 25000003 PHARM REV CODE 250: Performed by: NURSE ANESTHETIST, CERTIFIED REGISTERED

## 2025-02-11 PROCEDURE — 36561 INSERT TUNNELED CV CATH: CPT | Mod: RT,,, | Performed by: SURGERY

## 2025-02-11 PROCEDURE — 36000706: Performed by: SURGERY

## 2025-02-11 PROCEDURE — 71000016 HC POSTOP RECOV ADDL HR: Performed by: SURGERY

## 2025-02-11 PROCEDURE — 25000003 PHARM REV CODE 250: Performed by: ANESTHESIOLOGY

## 2025-02-11 PROCEDURE — 71000015 HC POSTOP RECOV 1ST HR: Performed by: SURGERY

## 2025-02-11 PROCEDURE — 25000242 PHARM REV CODE 250 ALT 637 W/ HCPCS: Performed by: ANESTHESIOLOGY

## 2025-02-11 PROCEDURE — 63600175 PHARM REV CODE 636 W HCPCS: Performed by: SURGERY

## 2025-02-11 PROCEDURE — 37000008 HC ANESTHESIA 1ST 15 MINUTES: Performed by: SURGERY

## 2025-02-11 PROCEDURE — 36000707: Performed by: SURGERY

## 2025-02-11 DEVICE — PORT POWER CLEAR VIEW: Type: IMPLANTABLE DEVICE | Site: CHEST | Status: FUNCTIONAL

## 2025-02-11 RX ORDER — PROCHLORPERAZINE EDISYLATE 5 MG/ML
5 INJECTION INTRAMUSCULAR; INTRAVENOUS EVERY 30 MIN PRN
OUTPATIENT
Start: 2025-02-11

## 2025-02-11 RX ORDER — BUPIVACAINE HYDROCHLORIDE 2.5 MG/ML
INJECTION, SOLUTION EPIDURAL; INFILTRATION; INTRACAUDAL
Status: DISCONTINUED | OUTPATIENT
Start: 2025-02-11 | End: 2025-02-11 | Stop reason: HOSPADM

## 2025-02-11 RX ORDER — HYDROMORPHONE HYDROCHLORIDE 2 MG/ML
0.4 INJECTION, SOLUTION INTRAMUSCULAR; INTRAVENOUS; SUBCUTANEOUS EVERY 5 MIN PRN
OUTPATIENT
Start: 2025-02-11

## 2025-02-11 RX ORDER — GLUCAGON 1 MG
1 KIT INJECTION
OUTPATIENT
Start: 2025-02-11

## 2025-02-11 RX ORDER — ONDANSETRON HYDROCHLORIDE 2 MG/ML
4 INJECTION, SOLUTION INTRAVENOUS DAILY PRN
OUTPATIENT
Start: 2025-02-11

## 2025-02-11 RX ORDER — ONDANSETRON HYDROCHLORIDE 2 MG/ML
INJECTION, SOLUTION INTRAVENOUS
Status: DISCONTINUED | OUTPATIENT
Start: 2025-02-11 | End: 2025-02-11

## 2025-02-11 RX ORDER — PHENYLEPHRINE HYDROCHLORIDE 10 MG/ML
INJECTION INTRAVENOUS
Status: DISCONTINUED | OUTPATIENT
Start: 2025-02-11 | End: 2025-02-11

## 2025-02-11 RX ORDER — CEFAZOLIN SODIUM 1 G/3ML
2 INJECTION, POWDER, FOR SOLUTION INTRAMUSCULAR; INTRAVENOUS
Status: COMPLETED | OUTPATIENT
Start: 2025-02-11 | End: 2025-02-11

## 2025-02-11 RX ORDER — ONDANSETRON 4 MG/1
8 TABLET, ORALLY DISINTEGRATING ORAL EVERY 6 HOURS PRN
Status: DISCONTINUED | OUTPATIENT
Start: 2025-02-11 | End: 2025-02-11 | Stop reason: HOSPADM

## 2025-02-11 RX ORDER — LIDOCAINE HYDROCHLORIDE 10 MG/ML
1 INJECTION, SOLUTION EPIDURAL; INFILTRATION; INTRACAUDAL; PERINEURAL ONCE
Status: DISCONTINUED | OUTPATIENT
Start: 2025-02-11 | End: 2025-02-11 | Stop reason: HOSPADM

## 2025-02-11 RX ORDER — TERAZOSIN 2 MG/1
2 CAPSULE ORAL NIGHTLY
COMMUNITY

## 2025-02-11 RX ORDER — LIDOCAINE HYDROCHLORIDE 10 MG/ML
INJECTION, SOLUTION INFILTRATION; PERINEURAL
Status: DISCONTINUED | OUTPATIENT
Start: 2025-02-11 | End: 2025-02-11 | Stop reason: HOSPADM

## 2025-02-11 RX ORDER — IPRATROPIUM BROMIDE AND ALBUTEROL SULFATE 2.5; .5 MG/3ML; MG/3ML
3 SOLUTION RESPIRATORY (INHALATION)
OUTPATIENT
Start: 2025-02-11

## 2025-02-11 RX ORDER — PROPOFOL 10 MG/ML
VIAL (ML) INTRAVENOUS
Status: DISCONTINUED | OUTPATIENT
Start: 2025-02-11 | End: 2025-02-11

## 2025-02-11 RX ORDER — MIDAZOLAM HYDROCHLORIDE 2 MG/2ML
0.5 INJECTION, SOLUTION INTRAMUSCULAR; INTRAVENOUS ONCE AS NEEDED
Status: DISCONTINUED | OUTPATIENT
Start: 2025-02-11 | End: 2025-02-11 | Stop reason: HOSPADM

## 2025-02-11 RX ORDER — HYDROMORPHONE HYDROCHLORIDE 2 MG/ML
0.2 INJECTION, SOLUTION INTRAMUSCULAR; INTRAVENOUS; SUBCUTANEOUS EVERY 5 MIN PRN
OUTPATIENT
Start: 2025-02-11

## 2025-02-11 RX ORDER — IPRATROPIUM BROMIDE AND ALBUTEROL SULFATE 2.5; .5 MG/3ML; MG/3ML
3 SOLUTION RESPIRATORY (INHALATION) ONCE
Status: COMPLETED | OUTPATIENT
Start: 2025-02-11 | End: 2025-02-11

## 2025-02-11 RX ORDER — CILOSTAZOL 50 MG/1
50 TABLET ORAL 2 TIMES DAILY
COMMUNITY

## 2025-02-11 RX ORDER — SODIUM CHLORIDE, SODIUM GLUCONATE, SODIUM ACETATE, POTASSIUM CHLORIDE AND MAGNESIUM CHLORIDE 30; 37; 368; 526; 502 MG/100ML; MG/100ML; MG/100ML; MG/100ML; MG/100ML
INJECTION, SOLUTION INTRAVENOUS CONTINUOUS
Status: DISCONTINUED | OUTPATIENT
Start: 2025-02-11 | End: 2025-02-11 | Stop reason: HOSPADM

## 2025-02-11 RX ORDER — TAMSULOSIN HYDROCHLORIDE 0.4 MG/1
1 CAPSULE ORAL
COMMUNITY

## 2025-02-11 RX ORDER — SODIUM CHLORIDE 9 MG/ML
INJECTION, SOLUTION INTRAVENOUS CONTINUOUS
Status: DISCONTINUED | OUTPATIENT
Start: 2025-02-11 | End: 2025-02-11 | Stop reason: HOSPADM

## 2025-02-11 RX ORDER — PROPOFOL 10 MG/ML
VIAL (ML) INTRAVENOUS CONTINUOUS PRN
Status: DISCONTINUED | OUTPATIENT
Start: 2025-02-11 | End: 2025-02-11

## 2025-02-11 RX ORDER — HEPARIN SOD,PORCINE/0.9 % NACL 1000/500ML
INTRAVENOUS SOLUTION INTRAVENOUS
Status: DISCONTINUED | OUTPATIENT
Start: 2025-02-11 | End: 2025-02-11 | Stop reason: HOSPADM

## 2025-02-11 RX ORDER — EPHEDRINE SULFATE 50 MG/ML
INJECTION, SOLUTION INTRAVENOUS
Status: DISCONTINUED | OUTPATIENT
Start: 2025-02-11 | End: 2025-02-11

## 2025-02-11 RX ADMIN — PROPOFOL 40 MG: 10 INJECTION, EMULSION INTRAVENOUS at 08:02

## 2025-02-11 RX ADMIN — PHENYLEPHRINE HYDROCHLORIDE 100 MCG: 10 INJECTION INTRAVENOUS at 09:02

## 2025-02-11 RX ADMIN — IPRATROPIUM BROMIDE AND ALBUTEROL SULFATE 3 ML: .5; 3 SOLUTION RESPIRATORY (INHALATION) at 08:02

## 2025-02-11 RX ADMIN — EPHEDRINE SULFATE 15 MG: 50 INJECTION INTRAVENOUS at 09:02

## 2025-02-11 RX ADMIN — CEFAZOLIN 2 G: 330 INJECTION, POWDER, FOR SOLUTION INTRAMUSCULAR; INTRAVENOUS at 09:02

## 2025-02-11 RX ADMIN — PROPOFOL 20 MG: 10 INJECTION, EMULSION INTRAVENOUS at 08:02

## 2025-02-11 RX ADMIN — PROPOFOL 75 MCG/KG/MIN: 10 INJECTION, EMULSION INTRAVENOUS at 08:02

## 2025-02-11 RX ADMIN — SODIUM CHLORIDE, SODIUM GLUCONATE, SODIUM ACETATE, POTASSIUM CHLORIDE AND MAGNESIUM CHLORIDE 400 ML/HR: 526; 502; 368; 37; 30 INJECTION, SOLUTION INTRAVENOUS at 08:02

## 2025-02-11 RX ADMIN — ONDANSETRON 4 MG: 2 INJECTION INTRAMUSCULAR; INTRAVENOUS at 08:02

## 2025-02-11 NOTE — ANESTHESIA PREPROCEDURE EVALUATION
"                                                                                                             02/11/2025  Gerald J Lejeune is a 79 y.o., male with dementia with a history of seizures, COPD on oxygen, ADAM, PAD status post stent previously on Plavix/Xarelto, AAA, recurrent right pleural effusion presents as an outpatient for Port-A-Cath insertion (small-cell lung cancer).  Cleared by PCP.  Patient arrives to outpatient surgery satting 85-88% on room air.    Transthoracic echo September 2024   EF 45% with grade 1 diastolic dysfunction   Negative bubble study   Mild MR/TR   PA systolic pressure 34    Last 3 sets of Vitals        2/3/2025    12:53 PM 2/7/2025     1:56 PM 2/11/2025     6:21 AM   Vitals - 1 value per visit   SYSTOLIC 121  137   DIASTOLIC 72  83   Pulse 72  74   Temp 36.3 °C (97.4 °F)  36.5 °C (97.7 °F)   Resp 18  20   SPO2 99 %  85 %   Weight (lb) 181 181    Weight (kg) 82.101 82.101    Height 5' 3" (1.6 m) 5' 3" (1.6 m)    BMI (Calculated) 32.1 32.1    Pain Score Zero           Lab Results   Component Value Date    WBC 6.51 02/03/2025    HGB 11.9 (L) 02/03/2025    HCT 36.1 (L) 02/03/2025    MCV 93.8 02/03/2025     (L) 02/03/2025            Chemistry        Component Value Date/Time     02/03/2025 1214    K 3.8 02/03/2025 1214     02/03/2025 1214    CO2 25 02/03/2025 1214    BUN 7.9 (L) 02/03/2025 1214    CREATININE 0.66 (L) 02/03/2025 1214        Component Value Date/Time    CALCIUM 9.3 02/03/2025 1214    ALKPHOS 74 02/03/2025 1214    AST 12 02/03/2025 1214    ALT 8 02/03/2025 1214    BILITOT 0.5 02/03/2025 1214        Pre-op Assessment    I have reviewed the Patient Summary Reports.    I have reviewed the NPO Status.   I have reviewed the Medications.     Review of Systems  Anesthesia Hx:               Denies Personal Hx of Anesthesia complications.                    Social:  Former Smoker       Hematology/Oncology:       -- Anemia:                                "   Cardiovascular:     Hypertension                  Functional Capacity 2 METS         Peripheral Arterial Disease        Abdominal Aortic Aneurysm            Pulmonary:   COPD   Shortness of breath  Sleep Apnea, CPAP                Neurological:       Seizures         Dementia                         Endocrine:        Obesity / BMI > 30      Physical Exam  General: Well nourished, Cooperative, Alert and Oriented    Airway:  Mallampati: II   Mouth Opening: Normal  TM Distance: Normal  Tongue: Normal  Neck ROM: Normal ROM    Dental:  Dentures    Chest/Lungs:  Clear to auscultation, Normal Respiratory Rate    Heart:  Rate: Normal  Rhythm: Regular Rhythm        Anesthesia Plan  Type of Anesthesia, risks & benefits discussed:    Anesthesia Type: Gen Natural Airway  Intra-op Monitoring Plan: Standard ASA Monitors  Induction:  IV  Informed Consent: Informed consent signed with the Patient and all parties understand the risks and agree with anesthesia plan.  All questions answered.   ASA Score: 4  Day of Surgery Review of History & Physical: H&P Update referred to the surgeon/provider.  Anesthesia Plan Notes: Preop DuoNeb   Local per surgeon  POM mask    Ready For Surgery From Anesthesia Perspective.     .

## 2025-02-11 NOTE — OP NOTE
[facility]  Operative Note      Surgery Date:  02/11/2025     Pre-op Diagnosis:    Small cell neuroendocrine carcinoma of lung [C7A.1]     Post-op Diagnosis:  Same     Procedure(s): Right IJ MediPort placement using ultrasound guidance and fluoroscopy    Indication for procedure: Gerald J Lejeune is a 79 y.o. male who has a diagnosis of Small cell neuroendocrine carcinoma of lung [C7A.1] and needs access for chemotherapy    Surgeon:  Artur Gomez MD    Assistant:  Circulator: Barby Jarrell RN  Scrub Person: Nya Lozano ST  X-Ray Technologist: Ton Bonds, RT     Anesthesia:  Local MAC     Findings: Ultrasound evaluation of the right IJ noted it to be patent.  Fluoroscopy showed the catheter to be in place at the atriocaval junction with no kinking    Complications: None     Estimated Blood Loss: 5 mL         Specimens: None    Implants:  Implant Name Type Inv. Item Serial No.  Lot No. LRB No. Used Action   PORT POWER CLEAR VIEW - SNA  PORT POWER CLEAR VIEW NA [employer] [id number] Right 1 Implanted       Drains: None    Procedure in detail: After informed consent was obtained, and risks and benefits discussed with the patient, he was brought to the operating room and placed supine.  After adequate sedation was obtained, he was prepped and draped in a standard sterile fashion.  Local anesthetic was infused in the skin overlying the right IJ and it was accessed with a micropuncture needle under ultrasound guidance.  I advanced the wire down into the superior vena cava.  A tunnel was then marked on the skin and a pocket marked as well for the port.  Local anesthetic was infused in this area.  An incision was made and a pocket dissected free for the port.  I then tunneled between the port site and the vein access site and passed the catheter.  A microcatheter was then advanced over the wire.  The wire and dilator removed and an 035 wire advanced down into the SVC.  The dilator and  peel-away sheath were advanced over the wire under fluoroscopy.  The wire and dilator were then removed.  The catheter was advanced down into the right ventricle.  The peel-away sheath was cracked and removed.  The catheter was then withdrawn under fluoroscopy until it was at the atriocaval junction.  The catheter was cut to length and connected to the port mechanism.  The port was then sutured to the clavipectoral fascia using 3-0 Vicryl sutures in 2 locations.  The port was able to be aspirated and flushed well.  The incision was then closed using interrupted 3-0 Vicryl sutures for the deep layer and 4-0 Monocryl subcuticular for the skin.  The vein access site was closed with a 4-0 Monocryl subcuticular stitch as well.  Heparinized saline was infused into the port and catheter.  Dermabond was placed over the incisions.  The patient was brought to recovery in stable condition.    Condition: Good

## 2025-02-11 NOTE — DISCHARGE SUMMARY
[facility] - Periop Services  Discharge Note  Short Stay    Procedure(s) (LRB):  [id number] (Right)      OUTCOME: Patient tolerated treatment/procedure well without complication and is now ready for discharge.    DISPOSITION: Home or Self Care    FINAL DIAGNOSIS:  Small cell neuroendocrine carcinoma of lung    FOLLOWUP: None    DISCHARGE INSTRUCTIONS:  No discharge procedures on file.     TIME SPENT ON DISCHARGE:  minutes

## 2025-02-11 NOTE — TRANSFER OF CARE
"Anesthesia Transfer of Care Note    Patient: Gerald J Lejeune    Procedure(s) Performed: Procedure(s) (LRB):  [id number] (Right)    Patient location: Other: phase 2 outpatient    Anesthesia Type: general    Transport from OR: Transported from OR on 2-3 L/min O2 by NC with adequate spontaneous ventilation    Post pain: adequate analgesia    Post vital signs: stable    Level of consciousness: awake and confused    Nausea/Vomiting: no nausea/vomiting    Complications: none    Transfer of care protocol was followed    Last vitals: Visit Vitals  /66   Pulse 67   Temp 36 °C (96.8 °F)   Resp 18   Ht 5' 3" (1.6 m)   Wt 79.4 kg (175 lb 0.7 oz)   SpO2 97%   BMI 31.01 kg/m²     "

## 2025-02-11 NOTE — H&P
[facility] - Periop Services  History & Physical  Vascular Surgery    SUBJECTIVE:     Chief Complaint/Reason for Visit:  MediPort     History of Present Illness:  Gerald J Lejeune is a 79 y.o. male who presents for MediPort placement.  He has a diagnosis right lung cancer, small cell.  He needs access to begin immunotherapy.    Review of patient's allergies indicates:  No Known Allergies    Past Medical History:   Diagnosis Date    Adenopathy     Anesthesia complication     takes longer to wake    Anxiety     BPH (benign prostatic hyperplasia)     COPD (chronic obstructive pulmonary disease)     Dementia     Depression     Dizziness     Does use hearing aid     Hyperlipidemia     Hypertension     Insomnia     Obesity     Oxygen dependent     4L/NC prn    PAD (peripheral artery disease)     Post-obstructive pneumonia due to foreign body aspiration     Levaquin    Seizures     Shortness of breath     Sleep apnea     uses CPAP    Small cell neuroendocrine carcinoma of lung     Use of cane as ambulatory aid     Walker as ambulation aid      Past Surgical History:   Procedure Laterality Date    BELOW KNEE AMPUTATION OF LOWER EXTREMITY Right     BRONCHOSCOPY, WITH TRANSBRONCHIAL BIOPSY Right 10/02/2024    Procedure: BRONCHOSCOPY, WITH TRANSBRONCHIAL BIOPSY;  Surgeon: Lee Suarez Jr., MD, [facility];  Location: [facility] BRONCHOSCOPY LAB;  Service: Pulmonary;  Laterality: Right;  RUL    CATARACT EXTRACTION      ENDOBRONCHIAL ULTRASOUND N/A 10/02/2024    Procedure: ENDOBRONCHIAL ULTRASOUND (EBUS)  with FLURO;  Surgeon: Lee Suarez Jr., MD, [facility];  Location: [facility] BRONCHOSCOPY LAB;  Service: Pulmonary;  Laterality: N/A;    INSERTION, STENT, BARE METAL, ARTERY, PERIPHERAL  2019    PERIPHERALLY INSERTED CENTRAL CATHETER INSERTION N/A 10/14/2024    Procedure: Insertion-picc;  Surgeon: Lejeune, Elizabeth Kennedy, MD;  Location: [facility] OR;  Service: Oncology;  Laterality: N/A;    THORACENTESIS  09/21/2024    x3     Family  "History   Problem Relation Name Age of Onset    Anesthesia problems Cousin          "trouble waking up after"     Social History     Tobacco Use    Smoking status: Former     Current packs/day: 0.00     Types: Cigarettes     Quit date: 2/8/2022     Years since quitting: 3.0    Smokeless tobacco: Never   Substance Use Topics    Alcohol use: Not Currently     Alcohol/week: 2.0 standard drinks of alcohol     Types: 2 Cans of beer per week    Drug use: Never        Prior to Admission medications    Medication Sig Start Date End Date Taking? Authorizing Provider   budesonide-glycopyr-formoterol 160-9-4.8 mcg/actuation HFAA    Yes Provider, Historical   diltiaZEM (CARDIZEM) 120 MG tablet Take 1 tablet by mouth once daily.   Yes Provider, Historical   EScitalopram oxalate (LEXAPRO) 5 MG Tab Take 5 mg by mouth once daily. 7/8/24  Yes Provider, Historical   fluticasone propionate (FLONASE) 50 mcg/actuation nasal spray 1 spray by Each Nostril route once daily.   Yes Provider, Historical   HYDROcodone-acetaminophen (NORCO)  mg per tablet Take 1 tablet by mouth every 6 (six) hours as needed for Pain. 7/8/24  Yes Provider, Historical   ipratropium (ATROVENT) 42 mcg (0.06 %) nasal spray 2 sprays by Each Nostril route 3 (three) times daily.   Yes Provider, Historical   levETIRAcetam (KEPPRA) 500 MG Tab Take 1 tablet (500 mg total) by mouth 2 (two) times daily. 9/24/24 2/11/25 Yes Matias Palacio MD   memantine (NAMENDA) 10 MG Tab Take 10 mg by mouth 2 (two) times daily. 9/5/24  Yes Provider, Historical   tamsulosin (FLOMAX) 0.4 mg Cap 1 capsule.   Yes Provider, Historical   XARELTO 20 mg Tab Take 20 mg by mouth once daily.   Yes Provider, Historical   clopidogreL (PLAVIX) 75 mg tablet Take 75 mg by mouth once daily. 7/8/24   Provider, Historical   dexAMETHasone (DECADRON) 4 MG Tab Take 2 tablets (8 mg total) by mouth once daily. on days 2-4 of each chemotherapy cycle 10/7/24   Lejeune, Elizabeth Kennedy, MD   furosemide " (LASIX) 20 MG tablet Take 1 tablet (20 mg total) by mouth once daily. 10/5/24 1/14/25  Matias Palacio MD   OLANZapine (ZYPREXA) 5 MG tablet Take 1 tablet by mouth nightly on days 1-4 of each chemotherapy cycle. 10/7/24   Lejeune, Elizabeth Kennedy, MD   prochlorperazine (COMPAZINE) 5 MG tablet Take 1 tablet (5 mg total) by mouth every 6 (six) hours as needed for Nausea. 10/7/24   Lejeune, Elizabeth Kennedy, MD   triamcinolone acetonide 0.1% (KENALOG) 0.1 % ointment Apply topically 2 (two) times daily. Apply small amount to effective area of rash x 2 weeks prn 11/13/24   Matthias Diaz MD       Review of Systems:  Negative except as in HPI    OBJECTIVE:     Vital Signs (Most Recent):  Temp: 97.7 °F (36.5 °C) (02/11/25 0621)  Pulse: 71 (02/11/25 0625)  Resp: 20 (02/11/25 0621)  BP: 137/83 (02/11/25 0621)  SpO2: (!) 93 % (02/11/25 0625)    Admission: Weight: 82.1 kg (181 lb) (02/07/25 1356)   Most Recent: Weight: 82.1 kg (181 lb) (02/07/25 1356)    Physical Exam:  Vascular Physical Exam  Vitals and nursing note reviewed.   Cardiovascular:      Rate and Rhythm: Normal rate and regular rhythm.      Pulses:           Radial pulses are 2+ on the right side and 2+ on the left side.        Brachial pulses are 2+ on the right side and 2+ on the left side.  Pulmonary:      Effort: Pulmonary effort is normal.      Breath sounds: Normal air entry. No stridor.   Neurological:      Mental Status: He is alert and oriented to person, place, and time.                ASSESSMENT/PLAN:     Gerald J Lejeune is a 79 y.o. 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  Discussed the risks and benefits of the procedure including infection, bleeding, scar, port thrombosis, and port rotation.  He does agree that he would like to proceed.

## 2025-02-12 NOTE — ANESTHESIA POSTPROCEDURE EVALUATION
Anesthesia Post Evaluation    Patient: Gerald J Lejeune    Procedure(s) Performed: Procedure(s) (LRB):  [id number] (Right)    Final Anesthesia Type: general      Patient location during evaluation: floor  Patient participation: Yes- Able to Participate  Level of consciousness: awake and alert  Post-procedure vital signs: reviewed and stable  Pain management: adequate  Airway patency: patent    PONV status at discharge: No PONV  Anesthetic complications: no      Cardiovascular status: blood pressure returned to baseline  Respiratory status: spontaneous ventilation and room air  Hydration status: euvolemic  Follow-up not needed.              Vitals Value Taken Time   /65 02/11/25 1050   Temp 36 °C (96.8 °F) 02/11/25 0954   Pulse 77 02/11/25 1050   Resp 20 02/11/25 1050   SpO2 94 % 02/11/25 1050         No case tracking events are documented in the log.      Pain/Alem Score: Alem Score: 10 (2/11/2025 10:50 AM)  Modified Alem Score: 20 (2/11/2025 10:50 AM)

## 2025-02-14 ENCOUNTER — INFUSION (OUTPATIENT)
Dept: INFUSION THERAPY | Facility: HOSPITAL | Age: 80
End: 2025-02-14
Attending: STUDENT IN AN ORGANIZED HEALTH CARE EDUCATION/TRAINING PROGRAM
Payer: MEDICARE

## 2025-02-14 ENCOUNTER — TELEPHONE (OUTPATIENT)
Dept: VASCULAR SURGERY | Facility: CLINIC | Age: 80
End: 2025-02-14
Payer: MEDICARE

## 2025-02-14 VITALS
RESPIRATION RATE: 20 BRPM | WEIGHT: 174.19 LBS | DIASTOLIC BLOOD PRESSURE: 68 MMHG | SYSTOLIC BLOOD PRESSURE: 132 MMHG | HEART RATE: 79 BPM | TEMPERATURE: 98 F | HEIGHT: 63 IN | BODY MASS INDEX: 30.86 KG/M2

## 2025-02-14 DIAGNOSIS — C7A.1 SMALL CELL NEUROENDOCRINE CARCINOMA OF LUNG: Primary | ICD-10-CM

## 2025-02-14 PROCEDURE — 96413 CHEMO IV INFUSION 1 HR: CPT

## 2025-02-14 PROCEDURE — 63600175 PHARM REV CODE 636 W HCPCS: Mod: JZ,TB | Performed by: STUDENT IN AN ORGANIZED HEALTH CARE EDUCATION/TRAINING PROGRAM

## 2025-02-14 PROCEDURE — 25000003 PHARM REV CODE 250: Performed by: STUDENT IN AN ORGANIZED HEALTH CARE EDUCATION/TRAINING PROGRAM

## 2025-02-14 RX ORDER — EPINEPHRINE 0.3 MG/.3ML
0.3 INJECTION SUBCUTANEOUS ONCE AS NEEDED
Status: DISCONTINUED | OUTPATIENT
Start: 2025-02-14 | End: 2025-02-14 | Stop reason: HOSPADM

## 2025-02-14 RX ORDER — PROCHLORPERAZINE EDISYLATE 5 MG/ML
5 INJECTION INTRAMUSCULAR; INTRAVENOUS ONCE AS NEEDED
Status: DISCONTINUED | OUTPATIENT
Start: 2025-02-14 | End: 2025-02-14 | Stop reason: HOSPADM

## 2025-02-14 RX ORDER — DIPHENHYDRAMINE HYDROCHLORIDE 50 MG/ML
50 INJECTION INTRAMUSCULAR; INTRAVENOUS ONCE AS NEEDED
Status: DISCONTINUED | OUTPATIENT
Start: 2025-02-14 | End: 2025-02-14 | Stop reason: HOSPADM

## 2025-02-14 RX ORDER — SODIUM CHLORIDE 0.9 % (FLUSH) 0.9 %
10 SYRINGE (ML) INJECTION
Status: DISCONTINUED | OUTPATIENT
Start: 2025-02-14 | End: 2025-02-14 | Stop reason: HOSPADM

## 2025-02-14 RX ORDER — HEPARIN 100 UNIT/ML
500 SYRINGE INTRAVENOUS
Status: DISCONTINUED | OUTPATIENT
Start: 2025-02-14 | End: 2025-02-14 | Stop reason: HOSPADM

## 2025-02-14 RX ADMIN — ATEZOLIZUMAB 1200 MG: 1200 INJECTION, SOLUTION INTRAVENOUS at 09:02

## 2025-02-14 RX ADMIN — SODIUM CHLORIDE: 9 INJECTION, SOLUTION INTRAVENOUS at 09:02

## 2025-02-14 NOTE — PLAN OF CARE
C5 Tecentriq given; tolerated well; next appointments confirmed with patient; discharged home in stable condition.

## 2025-03-05 ENCOUNTER — OFFICE VISIT (OUTPATIENT)
Dept: HEMATOLOGY/ONCOLOGY | Facility: CLINIC | Age: 80
End: 2025-03-05
Payer: MEDICARE

## 2025-03-05 ENCOUNTER — LAB VISIT (OUTPATIENT)
Dept: LAB | Facility: HOSPITAL | Age: 80
End: 2025-03-05
Attending: STUDENT IN AN ORGANIZED HEALTH CARE EDUCATION/TRAINING PROGRAM
Payer: MEDICARE

## 2025-03-05 VITALS
RESPIRATION RATE: 18 BRPM | HEIGHT: 63 IN | OXYGEN SATURATION: 92 % | DIASTOLIC BLOOD PRESSURE: 73 MMHG | BODY MASS INDEX: 32.14 KG/M2 | SYSTOLIC BLOOD PRESSURE: 130 MMHG | HEART RATE: 95 BPM | WEIGHT: 181.38 LBS | TEMPERATURE: 98 F

## 2025-03-05 DIAGNOSIS — C7A.1 SMALL CELL NEUROENDOCRINE CARCINOMA OF LUNG: ICD-10-CM

## 2025-03-05 DIAGNOSIS — Z79.899 LONG-TERM CURRENT USE OF HIGH RISK MEDICATION OTHER THAN ANTICOAGULANT: ICD-10-CM

## 2025-03-05 DIAGNOSIS — C7A.1 SMALL CELL NEUROENDOCRINE CARCINOMA OF LUNG: Primary | ICD-10-CM

## 2025-03-05 DIAGNOSIS — I82.591 CHRONIC DEEP VEIN THROMBOSIS (DVT) OF OTHER VEIN OF RIGHT LOWER EXTREMITY: ICD-10-CM

## 2025-03-05 LAB
ALBUMIN SERPL-MCNC: 3.6 G/DL (ref 3.4–4.8)
ALBUMIN/GLOB SERPL: 1.2 RATIO (ref 1.1–2)
ALP SERPL-CCNC: 74 UNIT/L (ref 40–150)
ALT SERPL-CCNC: 6 UNIT/L (ref 0–55)
ANION GAP SERPL CALC-SCNC: 7 MEQ/L
AST SERPL-CCNC: 11 UNIT/L (ref 5–34)
BASOPHILS # BLD AUTO: 0.03 X10(3)/MCL
BASOPHILS NFR BLD AUTO: 0.5 %
BILIRUB SERPL-MCNC: 0.3 MG/DL
BUN SERPL-MCNC: 11.1 MG/DL (ref 8.4–25.7)
CALCIUM SERPL-MCNC: 9.5 MG/DL (ref 8.8–10)
CHLORIDE SERPL-SCNC: 109 MMOL/L (ref 98–107)
CO2 SERPL-SCNC: 26 MMOL/L (ref 23–31)
CREAT SERPL-MCNC: 0.76 MG/DL (ref 0.72–1.25)
CREAT/UREA NIT SERPL: 15
EOSINOPHIL # BLD AUTO: 0.28 X10(3)/MCL (ref 0–0.9)
EOSINOPHIL NFR BLD AUTO: 4.3 %
ERYTHROCYTE [DISTWIDTH] IN BLOOD BY AUTOMATED COUNT: 12.8 % (ref 11.5–17)
GFR SERPLBLD CREATININE-BSD FMLA CKD-EPI: >60 ML/MIN/1.73/M2
GLOBULIN SER-MCNC: 3 GM/DL (ref 2.4–3.5)
GLUCOSE SERPL-MCNC: 87 MG/DL (ref 82–115)
HCT VFR BLD AUTO: 38.4 % (ref 42–52)
HGB BLD-MCNC: 12.5 G/DL (ref 14–18)
IMM GRANULOCYTES # BLD AUTO: 0.01 X10(3)/MCL (ref 0–0.04)
IMM GRANULOCYTES NFR BLD AUTO: 0.2 %
LYMPHOCYTES # BLD AUTO: 1.47 X10(3)/MCL (ref 0.6–4.6)
LYMPHOCYTES NFR BLD AUTO: 22.7 %
MCH RBC QN AUTO: 31.4 PG (ref 27–31)
MCHC RBC AUTO-ENTMCNC: 32.6 G/DL (ref 33–36)
MCV RBC AUTO: 96.5 FL (ref 80–94)
MONOCYTES # BLD AUTO: 0.63 X10(3)/MCL (ref 0.1–1.3)
MONOCYTES NFR BLD AUTO: 9.7 %
NEUTROPHILS # BLD AUTO: 4.05 X10(3)/MCL (ref 2.1–9.2)
NEUTROPHILS NFR BLD AUTO: 62.6 %
PLATELET # BLD AUTO: 147 X10(3)/MCL (ref 130–400)
PMV BLD AUTO: 9.8 FL (ref 7.4–10.4)
POTASSIUM SERPL-SCNC: 4.3 MMOL/L (ref 3.5–5.1)
PROT SERPL-MCNC: 6.6 GM/DL (ref 5.8–7.6)
RBC # BLD AUTO: 3.98 X10(6)/MCL (ref 4.7–6.1)
SODIUM SERPL-SCNC: 142 MMOL/L (ref 136–145)
TSH SERPL-ACNC: 0.64 UIU/ML (ref 0.35–4.94)
WBC # BLD AUTO: 6.47 X10(3)/MCL (ref 4.5–11.5)

## 2025-03-05 PROCEDURE — 36415 COLL VENOUS BLD VENIPUNCTURE: CPT

## 2025-03-05 PROCEDURE — 80053 COMPREHEN METABOLIC PANEL: CPT

## 2025-03-05 PROCEDURE — 99999 PR PBB SHADOW E&M-EST. PATIENT-LVL IV: CPT | Mod: PBBFAC,,,

## 2025-03-05 PROCEDURE — 99215 OFFICE O/P EST HI 40 MIN: CPT | Mod: S$PBB,,,

## 2025-03-05 PROCEDURE — G2211 COMPLEX E/M VISIT ADD ON: HCPCS | Mod: S$PBB,,,

## 2025-03-05 PROCEDURE — 99214 OFFICE O/P EST MOD 30 MIN: CPT | Mod: PBBFAC

## 2025-03-05 PROCEDURE — 85025 COMPLETE CBC W/AUTO DIFF WBC: CPT

## 2025-03-05 PROCEDURE — 84443 ASSAY THYROID STIM HORMONE: CPT

## 2025-03-05 NOTE — PROGRESS NOTES
Subjective:       Patient ID: Gerald J Lejeune is a 79 y.o. male.    Chief Complaint: Follow up    Diagnosis: Extensive Stage Right Lung Small Cell Carcinoma    Current Treatment:   Atezo alone 2/14/25- present    Treatment History: N/A   Carbo/Etop/Atezolizumab q3w - 10/15/24 - 12/27/2024    HPI:   80 yo M presented in October '24 for evaluation of extensive stage SCLC. He has a 150+ pack year smoking history, quit 1 year prior to presentation, on 3-4L NC at baseline. He presented to [facility] in September '24 for new onset seizures. Workup was done which was unrevealing of etiology, but during that workup he was found to have large volume R pleural effusion, large mediastinal LAD, and R mainstem mucous plugging vs mass obstruction. He has had his R sided pleural effusion drained twice due to quick reaccumulation, cytology negative x2. He underwent EBUS on 10/2 where within the R mainstem bronchus a near complete occlusion due to fungating mass was noted. Biopsy was done with final pathology revealed grade 3 neuroendocrine carcinoma consistent with small cell lung cancer.     His diagnosis, staging, and prognosis. The NCCN guidelines when discussing treatment therapy were referenced. The common side effects of chemotherapy and immunotherapy in detail including fatigue, nausea, vomiting, constipation, diarrhea, and increased risk of infections were explained. His median OS with and without treatment was discussed.  The role of palliative care as a supplement to treatment to support him and his family with the symptoms of his cancer was also discussed. They were agreeable to proceed.     October '24 PET with evidence of extensive disease to R lobe with pleural effusion and hypermetabolic LAD. No evidence of disease outside of the chest. He has since transitioned into 1st line carboplatin AUC 4, Etoposide 80mg/m2, and Atezolizumab 1200mg Q3w x 4 cycles in the palliative setting then repeat PET to assess response.  Chemotherapy dose reduced due to patients age, comorbidities, and performance status.     Interval History:   Patient presents to clinic today for NP follow up appointment and for toxicity check prior to C6 of Tecentriq on 3/6  He states he is doing well  Tolerating treatment without issue  Reports mediport placement prior to C5 and working well.  Denies any rash, diarrhea, SOB or cough  Rarely requires oxygen now.   Discussed labs with patient and family.  Otherwise he has no other concerns or issues at this time.       Past Medical History:   Diagnosis Date    Adenopathy     Anesthesia complication     takes longer to wake    Anxiety     BPH (benign prostatic hyperplasia)     COPD (chronic obstructive pulmonary disease)     Dementia     Depression     Dizziness     Does use hearing aid     Hyperlipidemia     Hypertension     Insomnia     Obesity     Oxygen dependent     4L/NC prn    PAD (peripheral artery disease)     Post-obstructive pneumonia due to foreign body aspiration     Levaquin    Seizures     Shortness of breath     Sleep apnea     uses CPAP    Small cell neuroendocrine carcinoma of lung     Use of cane as ambulatory aid     Walker as ambulation aid       Past Surgical History:   Procedure Laterality Date    BELOW KNEE AMPUTATION OF LOWER EXTREMITY Right     BRONCHOSCOPY, WITH TRANSBRONCHIAL BIOPSY Right 10/02/2024    Procedure: BRONCHOSCOPY, WITH TRANSBRONCHIAL BIOPSY;  Surgeon: Lee Suarez Jr., MD, [facility];  Location: [facility] BRONCHOSCOPY LAB;  Service: Pulmonary;  Laterality: Right;  RUL    CATARACT EXTRACTION      ENDOBRONCHIAL ULTRASOUND N/A 10/02/2024    Procedure: ENDOBRONCHIAL ULTRASOUND (EBUS)  with FLURO;  Surgeon: Lee Suarez Jr., MD, [facility];  Location: [facility] BRONCHOSCOPY LAB;  Service: Pulmonary;  Laterality: N/A;    INSERTION OF TUNNELED CENTRAL VENOUS CATHETER (CVC) WITH SUBCUTANEOUS PORT Right 2/11/2025    Procedure: [id number]-CATH;  Surgeon: Artur Gomez MD;  Location: [facility]  "OR;  Service: Peripheral Vascular;  Laterality: Right;    INSERTION, STENT, BARE METAL, ARTERY, PERIPHERAL  2019    PERIPHERALLY INSERTED CENTRAL CATHETER INSERTION N/A 10/14/2024    Procedure: Insertion-picc;  Surgeon: Lejeune, Elizabeth Kennedy, MD;  Location: [facility] OR;  Service: Oncology;  Laterality: N/A;    THORACENTESIS  09/21/2024    x3     Social History     Socioeconomic History    Marital status:    Tobacco Use    Smoking status: Former     Current packs/day: 0.00     Types: Cigarettes     Quit date: 2/8/2022     Years since quitting: 3.0    Smokeless tobacco: Never   Substance and Sexual Activity    Alcohol use: Not Currently     Alcohol/week: 2.0 standard drinks of alcohol     Types: 2 Cans of beer per week    Drug use: Never    Sexual activity: Not Currently     Partners: Female     Birth control/protection: None     Social Drivers of Health     Financial Resource Strain: Low Risk  (11/10/2024)    Overall Financial Resource Strain (CARDIA)     Difficulty of Paying Living Expenses: Not hard at all   Food Insecurity: No Food Insecurity (11/10/2024)    Hunger Vital Sign     Worried About Running Out of Food in the Last Year: Never true     Ran Out of Food in the Last Year: Never true   Transportation Needs: No Transportation Needs (10/1/2024)    TRANSPORTATION NEEDS     Transportation : No   Physical Activity: Inactive (11/10/2024)    Exercise Vital Sign     Days of Exercise per Week: 0 days     Minutes of Exercise per Session: 10 min   Stress: No Stress Concern Present (11/10/2024)    [employer] - Occupational Stress Questionnaire     Feeling of Stress : Only a little   Housing Stability: Unknown (11/10/2024)    Housing Stability Vital Sign     Unable to Pay for Housing in the Last Year: No     Homeless in the Last Year: No      Family History   Problem Relation Name Age of Onset    Anesthesia problems Cousin          "trouble waking up after"      Review of patient's " allergies indicates:  No Known Allergies   Review of Systems   Constitutional:  Negative for activity change, appetite change, chills, fatigue, fever and unexpected weight change.   HENT:  Negative for mouth sores and sore throat.    Eyes:  Negative for visual disturbance.   Respiratory:  Negative for cough and shortness of breath.    Cardiovascular:  Negative for chest pain.   Gastrointestinal:  Negative for abdominal pain, constipation, diarrhea, nausea and vomiting.   Endocrine: Negative for polyuria.   Genitourinary:  Negative for dysuria and frequency.   Musculoskeletal:  Negative for back pain.   Integumentary:  Negative for rash.   Allergic/Immunologic: Negative for frequent infections.   Neurological:  Negative for weakness and headaches.   Hematological:  Negative for adenopathy. Does not bruise/bleed easily.   Psychiatric/Behavioral:  The patient is nervous/anxious.          Objective:      Vitals:    03/05/25 0921   BP: 130/73   Pulse: 95   Resp: 18   Temp: 97.8 °F (36.6 °C)       Physical Exam  Constitutional:       General: He is not in acute distress.     Appearance: Normal appearance. He is not ill-appearing.   HENT:      Head: Normocephalic and atraumatic.      Nose: Nose normal.      Mouth/Throat:      Mouth: Mucous membranes are moist.      Pharynx: Oropharynx is clear.   Eyes:      Extraocular Movements: Extraocular movements intact.      Conjunctiva/sclera: Conjunctivae normal.      Pupils: Pupils are equal, round, and reactive to light.   Cardiovascular:      Rate and Rhythm: Normal rate and regular rhythm.      Pulses: Normal pulses.      Heart sounds: Normal heart sounds. No murmur heard.  Pulmonary:      Effort: Pulmonary effort is normal. No respiratory distress.   Abdominal:      General: There is no distension.      Palpations: Abdomen is soft.      Tenderness: There is no abdominal tenderness.   Musculoskeletal:         General: Deformity (R BKA) present. Normal range of motion.       Cervical back: Normal range of motion and neck supple.      Right lower leg: No edema.      Left lower leg: No edema.   Lymphadenopathy:      Cervical: No cervical adenopathy.   Skin:     General: Skin is warm and dry.   Neurological:      General: No focal deficit present.      Mental Status: He is alert and oriented to person, place, and time.         LABS AND IMAGING REVIEWED IN EPIC    IMAGIN/10/25 MRI Brain:  Impression:  1. Generalized cerebral and cerebellar atrophy  2. Scattered foci of abnormal signal intensity at the periventricular and subcortical white matter suspicious for foci of ischemia versus gliosis  3. Motion artifact  4. Patient refused intravenous contrast which limits the exam    25 PET/CT:  Impression:  1. Good treatment response with significantly improved right perihilar mass and mediastinal adenopathy.  2. Moderate right pleural effusion.      PATHOLOGY:  10/2/24 EBUS:  FINAL DIAGNOSIS   1. LYMPH NODE 4R, EBUS-GUIDED BIOPSY:   METASTATIC SMALL CELL CARCINOMA (HIGH GRADE NEUROENDOCRINE CARCINOMA).   IMMUNOHISTOCHEMICAL STAINS PERFORMED WITH ADEQUATE CONTROLS:   KI-67, LOW MOLECULAR WEIGHT CYTOKERATIN, CD56, SYNAPTOPHYSIN, TTF-1 AND   CHROMOGRANIN:  INCONCLUSIVE DUE TO EXTENSIVE NECROSIS.      2. RIGHT LUNG, UPPER LOBE, BIOPSY:   HIGH GRADE NEUROENDOCRINE CARCINOMA (SMALL CELL CARCINOMA).   IMMUNOHISTOCHEMICAL STAINS PERFORMED WITH ADEQUATE CONTROLS:   CD56, LOW MOLECULAR WEIGHT CYTOKERATIN, SYNAPTOPHYSIN, CHROMOGRANIN AND TTF-1:   STRONGLY POSITIVE IN MALIGNANT CELLS.   KI-67:  SHOWS NEAR 100% NUCLEAR POSITIVITY IN MALIGNANT CELLS.     Assessment:   Extensive Stage R Lung SCLC - with recurrent pleural effusions and near obstructing R mainstem mass. Treatment history as above. Good response to Carbo/Etop/Atezo, will now proceed with Atezolizumab alone.     Immunodeficiency due to Drug Therapy - Precautions discussed with patient. Continue to monitor for any signs of symptoms of  infection. No prophylaxis needed at this time. On growth factor with treatment.     Plan:   - Toxicity reviewed; okay to proceed with C6 of Atezo alone pending CMP  - Continue to follow Palliative as needed  - RTC 3 weeks for NP visit, labs same day - CBC, CMP, TSH prior to C7    Call Daughter with all appts/information    I spent a total of 40 minutes on the day of the visit.This includes face to face time and non-face to face time preparing to see the patient (eg, review of tests), obtaining and/or reviewing separately obtained history, documenting clinical information in the electronic or other health record, independently interpreting results and communicating results to the patient/family/caregiver, or care coordinator.    Visit today included increased complexity associated with the care of the episodic problem Extensive Stage R Lung SCLC, addressing and managing the longitudinal care of the patient's Extensive Stage R Lung SCLC.         SHASHANK DonP-C  Hematology/Oncology   [facility]

## 2025-03-07 ENCOUNTER — INFUSION (OUTPATIENT)
Dept: INFUSION THERAPY | Facility: HOSPITAL | Age: 80
End: 2025-03-07
Attending: STUDENT IN AN ORGANIZED HEALTH CARE EDUCATION/TRAINING PROGRAM
Payer: MEDICARE

## 2025-03-07 VITALS
SYSTOLIC BLOOD PRESSURE: 132 MMHG | DIASTOLIC BLOOD PRESSURE: 79 MMHG | RESPIRATION RATE: 18 BRPM | HEIGHT: 63 IN | WEIGHT: 181.38 LBS | BODY MASS INDEX: 32.14 KG/M2 | TEMPERATURE: 98 F | OXYGEN SATURATION: 95 % | HEART RATE: 83 BPM

## 2025-03-07 DIAGNOSIS — C7A.1 SMALL CELL NEUROENDOCRINE CARCINOMA OF LUNG: Primary | ICD-10-CM

## 2025-03-07 PROCEDURE — 96413 CHEMO IV INFUSION 1 HR: CPT

## 2025-03-07 PROCEDURE — 25000003 PHARM REV CODE 250

## 2025-03-07 PROCEDURE — 63600175 PHARM REV CODE 636 W HCPCS: Mod: JZ,TB

## 2025-03-07 PROCEDURE — A4216 STERILE WATER/SALINE, 10 ML: HCPCS

## 2025-03-07 RX ORDER — SODIUM CHLORIDE 0.9 % (FLUSH) 0.9 %
10 SYRINGE (ML) INJECTION
Status: DISCONTINUED | OUTPATIENT
Start: 2025-03-07 | End: 2025-03-07 | Stop reason: HOSPADM

## 2025-03-07 RX ORDER — DIPHENHYDRAMINE HYDROCHLORIDE 50 MG/ML
50 INJECTION INTRAMUSCULAR; INTRAVENOUS ONCE AS NEEDED
Status: DISCONTINUED | OUTPATIENT
Start: 2025-03-07 | End: 2025-03-07 | Stop reason: HOSPADM

## 2025-03-07 RX ORDER — EPINEPHRINE 0.3 MG/.3ML
0.3 INJECTION SUBCUTANEOUS ONCE AS NEEDED
Status: DISCONTINUED | OUTPATIENT
Start: 2025-03-07 | End: 2025-03-07 | Stop reason: HOSPADM

## 2025-03-07 RX ORDER — HEPARIN 100 UNIT/ML
500 SYRINGE INTRAVENOUS
Status: DISCONTINUED | OUTPATIENT
Start: 2025-03-07 | End: 2025-03-07 | Stop reason: HOSPADM

## 2025-03-07 RX ORDER — PROCHLORPERAZINE EDISYLATE 5 MG/ML
5 INJECTION INTRAMUSCULAR; INTRAVENOUS ONCE AS NEEDED
Status: DISCONTINUED | OUTPATIENT
Start: 2025-03-07 | End: 2025-03-07 | Stop reason: HOSPADM

## 2025-03-07 RX ADMIN — Medication 10 ML: at 11:03

## 2025-03-07 RX ADMIN — SODIUM CHLORIDE: 9 INJECTION, SOLUTION INTRAVENOUS at 10:03

## 2025-03-07 RX ADMIN — HEPARIN 500 UNITS: 100 SYRINGE at 11:03

## 2025-03-07 RX ADMIN — ATEZOLIZUMAB 1200 MG: 1200 INJECTION, SOLUTION INTRAVENOUS at 10:03

## 2025-03-07 NOTE — PLAN OF CARE
Plan of care reviewed with patient/significant other. C6 Tecentriq completed. Appts reviewed and printed for patient.

## 2025-03-25 NOTE — PROGRESS NOTES
Subjective:       Patient ID: Gerald J Lejeune is a 79 y.o. male.    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 He has a 150+ pack year smoking history, quit 1 year prior to presentation, on 3-4L NC at baseline. He presented to [facility] in September '24 for new onset seizures. Workup was done which was unrevealing of etiology, but during that workup he was found to have large volume R pleural effusion, large mediastinal LAD, and R mainstem mucous plugging vs mass obstruction. He has had his R sided pleural effusion drained twice due to quick reaccumulation, cytology negative x2. He underwent EBUS on 10/2 where within the R mainstem bronchus a near complete occlusion due to fungating mass was noted. Biopsy was done with final pathology revealed grade 3 neuroendocrine carcinoma consistent with small cell lung cancer.     His diagnosis, staging, and prognosis. The NCCN guidelines when discussing treatment therapy were referenced. The common side effects of chemotherapy and immunotherapy in detail including fatigue, nausea, vomiting, constipation, diarrhea, and increased risk of infections were explained. His median OS with and without treatment was discussed.  The role of palliative care as a supplement to treatment to support him and his family with the symptoms of his cancer was also discussed. They were agreeable to proceed.     October '24 PET with evidence of extensive disease to R lobe with pleural effusion and hypermetabolic LAD. No evidence of disease outside of the chest. He has since transitioned into 1st line carboplatin AUC 4, Etoposide 80mg/m2, and Atezolizumab 1200mg Q3w x 4 cycles in the palliative setting then repeat PET to assess response.  Chemotherapy dose reduced due to patients age, comorbidities, and performance status.     Interval History:   Patient presents to clinic today for NP follow up appointment and for toxicity check prior to C7 of Tecentriq on 3/27  Tolerating treatment without issue  Reports increased stress at home and have been very emotional.  He denies suicidal ideation at this time but have had times of not feeling worthy.  He has taken antidepressants in the past but discontinued some time ago.   Denies any rash, diarrhea, SOB or cough  Discussed labs with patient and family.  Otherwise he has no other concerns or issues at this time.       Past Medical History:   Diagnosis Date    Adenopathy     Anesthesia complication     takes longer to wake    Anxiety     BPH (benign prostatic hyperplasia)     COPD (chronic obstructive pulmonary disease)     Dementia     Depression     Dizziness     Does use hearing aid     Hyperlipidemia     Hypertension     Insomnia     Obesity     Oxygen dependent     4L/NC prn    PAD (peripheral artery disease)     Post-obstructive pneumonia due to foreign body aspiration     Levaquin    Seizures     Shortness of breath     Sleep apnea     uses CPAP    Small cell neuroendocrine carcinoma of lung     Use of cane as ambulatory aid     Walker as ambulation aid       Past Surgical History:   Procedure Laterality Date    BELOW KNEE AMPUTATION OF LOWER EXTREMITY Right     BRONCHOSCOPY, WITH TRANSBRONCHIAL BIOPSY Right 10/02/2024    Procedure: BRONCHOSCOPY, WITH TRANSBRONCHIAL BIOPSY;  Surgeon: Lee Suarez Jr., MD, [facility];  Location: [facility] BRONCHOSCOPY LAB;  Service: Pulmonary;  Laterality: Right;  RUL    CATARACT EXTRACTION      ENDOBRONCHIAL ULTRASOUND N/A 10/02/2024    Procedure: ENDOBRONCHIAL ULTRASOUND (EBUS)  with FLURO;  Surgeon: Lee Suarez Jr., MD, [facility];  Location: [facility] BRONCHOSCOPY LAB;  Service: Pulmonary;  Laterality: N/A;    INSERTION OF TUNNELED CENTRAL VENOUS CATHETER (CVC) WITH SUBCUTANEOUS  PORT Right 2/11/2025    Procedure: [id number];  Surgeon: Artur Gomez MD;  Location: [facility] OR;  Service: Peripheral Vascular;  Laterality: Right;    INSERTION, STENT, BARE METAL, ARTERY, PERIPHERAL  2019    PERIPHERALLY INSERTED CENTRAL CATHETER INSERTION N/A 10/14/2024    Procedure: Insertion-picc;  Surgeon: Lejeune, Elizabeth Kennedy, MD;  Location: [facility] OR;  Service: Oncology;  Laterality: N/A;    THORACENTESIS  09/21/2024    x3     Social History     Socioeconomic History    Marital status:    Tobacco Use    Smoking status: Former     Current packs/day: 0.00     Types: Cigarettes     Quit date: 2/8/2022     Years since quitting: 3.1    Smokeless tobacco: Never   Substance and Sexual Activity    Alcohol use: Not Currently     Alcohol/week: 2.0 standard drinks of alcohol     Types: 2 Cans of beer per week    Drug use: Never    Sexual activity: Not Currently     Partners: Female     Birth control/protection: None     Social Drivers of Health     Financial Resource Strain: Low Risk  (11/10/2024)    Overall Financial Resource Strain (CARDIA)     Difficulty of Paying Living Expenses: Not hard at all   Food Insecurity: No Food Insecurity (11/10/2024)    Hunger Vital Sign     Worried About Running Out of Food in the Last Year: Never true     Ran Out of Food in the Last Year: Never true   Transportation Needs: No Transportation Needs (10/1/2024)    TRANSPORTATION NEEDS     Transportation : No   Physical Activity: Inactive (11/10/2024)    Exercise Vital Sign     Days of Exercise per Week: 0 days     Minutes of Exercise per Session: 10 min   Stress: No Stress Concern Present (11/10/2024)    [employer] - Occupational Stress Questionnaire     Feeling of Stress : Only a little   Housing Stability: Unknown (11/10/2024)    Housing Stability Vital Sign     Unable to Pay for Housing in the Last Year: No     Homeless in the Last Year: No      Family History   Problem Relation  "Name Age of Onset    Anesthesia problems Cousin          "trouble waking up after"      Review of patient's allergies indicates:  No Known Allergies   Review of Systems   Constitutional:  Negative for activity change, appetite change, chills, fatigue, fever and unexpected weight change.   HENT:  Negative for mouth sores and sore throat.    Eyes:  Negative for visual disturbance.   Respiratory:  Negative for cough and shortness of breath.    Cardiovascular:  Negative for chest pain.   Gastrointestinal:  Negative for abdominal pain, constipation, diarrhea, nausea and vomiting.   Endocrine: Negative for polyuria.   Genitourinary:  Negative for dysuria and frequency.   Musculoskeletal:  Negative for back pain.   Integumentary:  Negative for rash.   Allergic/Immunologic: Negative for frequent infections.   Neurological:  Negative for weakness and headaches.   Hematological:  Negative for adenopathy. Does not bruise/bleed easily.   Psychiatric/Behavioral:  Positive for depressed mood. The patient is nervous/anxious.          Objective:      Vitals:    03/26/25 0935   BP: 119/70   Pulse: 84   Resp: 18   Temp: 97.6 °F (36.4 °C)       Physical Exam  Constitutional:       General: He is not in acute distress.     Appearance: Normal appearance. He is not ill-appearing.   HENT:      Head: Normocephalic and atraumatic.      Nose: Nose normal.      Mouth/Throat:      Mouth: Mucous membranes are moist.      Pharynx: Oropharynx is clear.   Eyes:      Extraocular Movements: Extraocular movements intact.      Conjunctiva/sclera: Conjunctivae normal.      Pupils: Pupils are equal, round, and reactive to light.   Cardiovascular:      Rate and Rhythm: Normal rate and regular rhythm.      Pulses: Normal pulses.      Heart sounds: Normal heart sounds. No murmur heard.  Pulmonary:      Effort: Pulmonary effort is normal. No respiratory distress.   Abdominal:      General: There is no distension.      Palpations: Abdomen is soft.      " Tenderness: There is no abdominal tenderness.   Musculoskeletal:         General: Deformity (R BKA) present. Normal range of motion.      Cervical back: Normal range of motion and neck supple.      Right lower leg: No edema.      Left lower leg: No edema.   Lymphadenopathy:      Cervical: No cervical adenopathy.   Skin:     General: Skin is warm and dry.   Neurological:      General: No focal deficit present.      Mental Status: He is alert and oriented to person, place, and time.         LABS AND IMAGING REVIEWED IN EPIC    IMAGIN/10/25 MRI Brain:  Impression:  1. Generalized cerebral and cerebellar atrophy  2. Scattered foci of abnormal signal intensity at the periventricular and subcortical white matter suspicious for foci of ischemia versus gliosis  3. Motion artifact  4. Patient refused intravenous contrast which limits the exam    25 PET/CT:  Impression:  1. Good treatment response with significantly improved right perihilar mass and mediastinal adenopathy.  2. Moderate right pleural effusion.      PATHOLOGY:  10/2/24 EBUS:  FINAL DIAGNOSIS   1. LYMPH NODE 4R, EBUS-GUIDED BIOPSY:   METASTATIC SMALL CELL CARCINOMA (HIGH GRADE NEUROENDOCRINE CARCINOMA).   IMMUNOHISTOCHEMICAL STAINS PERFORMED WITH ADEQUATE CONTROLS:   KI-67, LOW MOLECULAR WEIGHT CYTOKERATIN, CD56, SYNAPTOPHYSIN, TTF-1 AND   CHROMOGRANIN:  INCONCLUSIVE DUE TO EXTENSIVE NECROSIS.      2. RIGHT LUNG, UPPER LOBE, BIOPSY:   HIGH GRADE NEUROENDOCRINE CARCINOMA (SMALL CELL CARCINOMA).   IMMUNOHISTOCHEMICAL STAINS PERFORMED WITH ADEQUATE CONTROLS:   CD56, LOW MOLECULAR WEIGHT CYTOKERATIN, SYNAPTOPHYSIN, CHROMOGRANIN AND TTF-1:   STRONGLY POSITIVE IN MALIGNANT CELLS.   KI-67:  SHOWS NEAR 100% NUCLEAR POSITIVITY IN MALIGNANT CELLS.     Assessment:   Extensive Stage R Lung SCLC - with recurrent pleural effusions and near obstructing R mainstem mass. Treatment history as above. Good response to Carbo/Etop/Atezo, have now proceeded with Atezolizumab  alone.       Plan:   - Toxicity reviewed; okay to proceed with C7 of Atezo alone pending CMP  - Continue to follow Palliative as needed  - Repeat scans after C8  - Refer to KARL Pelaez LCSW for decreased mood  - RTC 3 weeks for MD visit, labs same day - CBC, CMP, TSH prior to C8    Call Daughter with all appts/information    I spent a total of 40 minutes on the day of the visit.This includes face to face time and non-face to face time preparing to see the patient (eg, review of tests), obtaining and/or reviewing separately obtained history, documenting clinical information in the electronic or other health record, independently interpreting results and communicating results to the patient/family/caregiver, or care coordinator.    Visit today included increased complexity associated with the care of the episodic problem Extensive Stage R Lung SCLC, addressing and managing the longitudinal care of the patient's Extensive Stage R Lung SCLC.         CLEMENTINA Don-C  Hematology/Oncology   Cancer Center [facility]

## 2025-03-26 ENCOUNTER — LAB VISIT (OUTPATIENT)
Dept: LAB | Facility: HOSPITAL | Age: 80
End: 2025-03-26
Attending: STUDENT IN AN ORGANIZED HEALTH CARE EDUCATION/TRAINING PROGRAM
Payer: MEDICARE

## 2025-03-26 ENCOUNTER — OFFICE VISIT (OUTPATIENT)
Dept: HEMATOLOGY/ONCOLOGY | Facility: CLINIC | Age: 80
End: 2025-03-26
Payer: MEDICARE

## 2025-03-26 VITALS
RESPIRATION RATE: 18 BRPM | DIASTOLIC BLOOD PRESSURE: 70 MMHG | OXYGEN SATURATION: 93 % | SYSTOLIC BLOOD PRESSURE: 119 MMHG | HEIGHT: 63 IN | HEART RATE: 84 BPM | WEIGHT: 184 LBS | TEMPERATURE: 98 F | BODY MASS INDEX: 32.6 KG/M2

## 2025-03-26 DIAGNOSIS — Z79.899 LONG-TERM CURRENT USE OF HIGH RISK MEDICATION OTHER THAN ANTICOAGULANT: ICD-10-CM

## 2025-03-26 DIAGNOSIS — C7A.1 SMALL CELL NEUROENDOCRINE CARCINOMA OF LUNG: ICD-10-CM

## 2025-03-26 DIAGNOSIS — C7A.1 SMALL CELL NEUROENDOCRINE CARCINOMA OF LUNG: Primary | ICD-10-CM

## 2025-03-26 LAB
ALBUMIN SERPL-MCNC: 3.4 G/DL (ref 3.4–4.8)
ALBUMIN/GLOB SERPL: 0.9 RATIO (ref 1.1–2)
ALP SERPL-CCNC: 82 UNIT/L (ref 40–150)
ALT SERPL-CCNC: 9 UNIT/L (ref 0–55)
ANION GAP SERPL CALC-SCNC: 6 MEQ/L
AST SERPL-CCNC: 11 UNIT/L (ref 11–45)
BASOPHILS # BLD AUTO: 0.03 X10(3)/MCL
BASOPHILS NFR BLD AUTO: 0.4 %
BILIRUB SERPL-MCNC: 0.3 MG/DL
BUN SERPL-MCNC: 15.5 MG/DL (ref 8.4–25.7)
CALCIUM SERPL-MCNC: 9.3 MG/DL (ref 8.8–10)
CHLORIDE SERPL-SCNC: 108 MMOL/L (ref 98–107)
CO2 SERPL-SCNC: 28 MMOL/L (ref 23–31)
CREAT SERPL-MCNC: 0.65 MG/DL (ref 0.72–1.25)
CREAT/UREA NIT SERPL: 24
EOSINOPHIL # BLD AUTO: 0.33 X10(3)/MCL (ref 0–0.9)
EOSINOPHIL NFR BLD AUTO: 4.1 %
ERYTHROCYTE [DISTWIDTH] IN BLOOD BY AUTOMATED COUNT: 12.2 % (ref 11.5–17)
GFR SERPLBLD CREATININE-BSD FMLA CKD-EPI: >60 ML/MIN/1.73/M2
GLOBULIN SER-MCNC: 3.6 GM/DL (ref 2.4–3.5)
GLUCOSE SERPL-MCNC: 87 MG/DL (ref 82–115)
HCT VFR BLD AUTO: 39.1 % (ref 42–52)
HGB BLD-MCNC: 12.8 G/DL (ref 14–18)
IMM GRANULOCYTES # BLD AUTO: 0.03 X10(3)/MCL (ref 0–0.04)
IMM GRANULOCYTES NFR BLD AUTO: 0.4 %
LYMPHOCYTES # BLD AUTO: 1.43 X10(3)/MCL (ref 0.6–4.6)
LYMPHOCYTES NFR BLD AUTO: 17.6 %
MCH RBC QN AUTO: 30.6 PG (ref 27–31)
MCHC RBC AUTO-ENTMCNC: 32.7 G/DL (ref 33–36)
MCV RBC AUTO: 93.5 FL (ref 80–94)
MONOCYTES # BLD AUTO: 0.85 X10(3)/MCL (ref 0.1–1.3)
MONOCYTES NFR BLD AUTO: 10.5 %
NEUTROPHILS # BLD AUTO: 5.46 X10(3)/MCL (ref 2.1–9.2)
NEUTROPHILS NFR BLD AUTO: 67 %
PLATELET # BLD AUTO: 142 X10(3)/MCL (ref 130–400)
PMV BLD AUTO: 9.3 FL (ref 7.4–10.4)
POTASSIUM SERPL-SCNC: 4.3 MMOL/L (ref 3.5–5.1)
PROT SERPL-MCNC: 7 GM/DL (ref 5.8–7.6)
RBC # BLD AUTO: 4.18 X10(6)/MCL (ref 4.7–6.1)
SODIUM SERPL-SCNC: 142 MMOL/L (ref 136–145)
TSH SERPL-ACNC: 0.89 UIU/ML (ref 0.35–4.94)
WBC # BLD AUTO: 8.13 X10(3)/MCL (ref 4.5–11.5)

## 2025-03-26 PROCEDURE — 80053 COMPREHEN METABOLIC PANEL: CPT

## 2025-03-26 PROCEDURE — 36415 COLL VENOUS BLD VENIPUNCTURE: CPT

## 2025-03-26 PROCEDURE — 99215 OFFICE O/P EST HI 40 MIN: CPT | Mod: S$PBB,,,

## 2025-03-26 PROCEDURE — 85025 COMPLETE CBC W/AUTO DIFF WBC: CPT

## 2025-03-26 PROCEDURE — 84443 ASSAY THYROID STIM HORMONE: CPT

## 2025-03-26 PROCEDURE — 99999 PR PBB SHADOW E&M-EST. PATIENT-LVL V: CPT | Mod: PBBFAC,,,

## 2025-03-26 PROCEDURE — G2211 COMPLEX E/M VISIT ADD ON: HCPCS | Mod: S$PBB,,,

## 2025-03-26 PROCEDURE — 99215 OFFICE O/P EST HI 40 MIN: CPT | Mod: PBBFAC

## 2025-03-27 ENCOUNTER — INFUSION (OUTPATIENT)
Dept: INFUSION THERAPY | Facility: HOSPITAL | Age: 80
End: 2025-03-27
Attending: STUDENT IN AN ORGANIZED HEALTH CARE EDUCATION/TRAINING PROGRAM
Payer: MEDICARE

## 2025-03-27 VITALS
WEIGHT: 184 LBS | BODY MASS INDEX: 32.6 KG/M2 | RESPIRATION RATE: 20 BRPM | OXYGEN SATURATION: 93 % | TEMPERATURE: 98 F | HEART RATE: 93 BPM | DIASTOLIC BLOOD PRESSURE: 69 MMHG | HEIGHT: 63 IN | SYSTOLIC BLOOD PRESSURE: 112 MMHG

## 2025-03-27 DIAGNOSIS — C7A.1 SMALL CELL NEUROENDOCRINE CARCINOMA OF LUNG: Primary | ICD-10-CM

## 2025-03-27 PROCEDURE — 96413 CHEMO IV INFUSION 1 HR: CPT

## 2025-03-27 PROCEDURE — A4216 STERILE WATER/SALINE, 10 ML: HCPCS

## 2025-03-27 PROCEDURE — 63600175 PHARM REV CODE 636 W HCPCS: Mod: JZ,TB

## 2025-03-27 PROCEDURE — 25000003 PHARM REV CODE 250

## 2025-03-27 RX ORDER — DIPHENHYDRAMINE HYDROCHLORIDE 50 MG/ML
50 INJECTION, SOLUTION INTRAMUSCULAR; INTRAVENOUS ONCE AS NEEDED
Status: DISCONTINUED | OUTPATIENT
Start: 2025-03-27 | End: 2025-03-27 | Stop reason: HOSPADM

## 2025-03-27 RX ORDER — SODIUM CHLORIDE 0.9 % (FLUSH) 0.9 %
10 SYRINGE (ML) INJECTION
Status: DISCONTINUED | OUTPATIENT
Start: 2025-03-27 | End: 2025-03-27 | Stop reason: HOSPADM

## 2025-03-27 RX ORDER — EPINEPHRINE 0.3 MG/.3ML
0.3 INJECTION SUBCUTANEOUS ONCE AS NEEDED
Status: DISCONTINUED | OUTPATIENT
Start: 2025-03-27 | End: 2025-03-27 | Stop reason: HOSPADM

## 2025-03-27 RX ORDER — HEPARIN 100 UNIT/ML
500 SYRINGE INTRAVENOUS
Status: DISCONTINUED | OUTPATIENT
Start: 2025-03-27 | End: 2025-03-27 | Stop reason: HOSPADM

## 2025-03-27 RX ORDER — PROCHLORPERAZINE EDISYLATE 5 MG/ML
5 INJECTION INTRAMUSCULAR; INTRAVENOUS ONCE AS NEEDED
Status: DISCONTINUED | OUTPATIENT
Start: 2025-03-27 | End: 2025-03-27 | Stop reason: HOSPADM

## 2025-03-27 RX ADMIN — Medication 10 ML: at 11:03

## 2025-03-27 RX ADMIN — ATEZOLIZUMAB 1200 MG: 1200 INJECTION, SOLUTION INTRAVENOUS at 11:03

## 2025-03-27 RX ADMIN — SODIUM CHLORIDE: 9 INJECTION, SOLUTION INTRAVENOUS at 11:03

## 2025-03-27 RX ADMIN — HEPARIN 500 UNITS: 100 SYRINGE at 11:03

## 2025-04-01 ENCOUNTER — TELEPHONE (OUTPATIENT)
Dept: HEMATOLOGY/ONCOLOGY | Facility: CLINIC | Age: 80
End: 2025-04-01
Payer: MEDICARE

## 2025-04-01 NOTE — TELEPHONE ENCOUNTER
I received a referral, reviewed the chart and contacted the patient. We talked about his emotions. He typically has good days mixed with some rough days. He said today was a good day. I provided him my phone number and he agreed to reach out to me if any needs arise.

## 2025-04-10 NOTE — PROGRESS NOTES
Subjective:       Patient ID: Gerald J Lejeune is a 79 y.o. male.    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 He has a 150+ pack year smoking history, quit 1 year prior to presentation, on 3-4L NC at baseline. He presented to [facility] in September '24 for new onset seizures. Workup was done which was unrevealing of etiology, but during that workup he was found to have large volume R pleural effusion, large mediastinal LAD, and R mainstem mucous plugging vs mass obstruction. He has had his R sided pleural effusion drained twice due to quick reaccumulation, cytology negative x2. He underwent EBUS on 10/2 where within the R mainstem bronchus a near complete occlusion due to fungating mass was noted. Biopsy was done with final pathology revealed grade 3 neuroendocrine carcinoma consistent with small cell lung cancer.     His diagnosis, staging, and prognosis. The NCCN guidelines when discussing treatment therapy were referenced. The common side effects of chemotherapy and immunotherapy in detail including fatigue, nausea, vomiting, constipation, diarrhea, and increased risk of infections were explained. His median OS with and without treatment was discussed.  The role of palliative care as a supplement to treatment to support him and his family with the symptoms of his cancer was also discussed. They were agreeable to proceed.     October '24 PET with evidence of extensive disease to R lobe with pleural effusion and hypermetabolic LAD. No evidence of disease outside of the chest. He has since transitioned into 1st line carboplatin AUC 4, Etoposide 80mg/m2, and Atezolizumab 1200mg Q3w x 4 cycles in the palliative setting then repeat PET to assess response.  Chemotherapy dose reduced due to patients age, comorbidities, and performance status.     Interval History:   Patient presents to clinic today for NP follow up appointment and labs for toxicity check prior to C8 of Tecentriq on 4/17.  Tolerating treatment without issue  Overall he states he is well.   Today, he have no complaints.   He denies rash, diarrhea, CP or worsening SOB.  Appetite and energy levels are stable.  Otherwise no issues or concerns.     Past Medical History:   Diagnosis Date    Adenopathy     Anesthesia complication     takes longer to wake    Anxiety     BPH (benign prostatic hyperplasia)     COPD (chronic obstructive pulmonary disease)     Dementia     Depression     Dizziness     Does use hearing aid     Hyperlipidemia     Hypertension     Insomnia     Obesity     Oxygen dependent     4L/NC prn    PAD (peripheral artery disease)     Post-obstructive pneumonia due to foreign body aspiration     Levaquin    Seizures     Shortness of breath     Sleep apnea     uses CPAP    Small cell neuroendocrine carcinoma of lung     Use of cane as ambulatory aid     Walker as ambulation aid       Past Surgical History:   Procedure Laterality Date    BELOW KNEE AMPUTATION OF LOWER EXTREMITY Right     BRONCHOSCOPY, WITH TRANSBRONCHIAL BIOPSY Right 10/02/2024    Procedure: BRONCHOSCOPY, WITH TRANSBRONCHIAL BIOPSY;  Surgeon: Lee Suarez Jr., MD, [facility];  Location: [facility] BRONCHOSCOPY LAB;  Service: Pulmonary;  Laterality: Right;  RUL    CATARACT EXTRACTION      ENDOBRONCHIAL ULTRASOUND N/A 10/02/2024    Procedure: ENDOBRONCHIAL ULTRASOUND (EBUS)  with FLURO;  Surgeon: Lee Suarez Jr., MD, [facility];  Location: [facility] BRONCHOSCOPY LAB;  Service: Pulmonary;  Laterality: N/A;    INSERTION OF TUNNELED CENTRAL VENOUS CATHETER (CVC) WITH SUBCUTANEOUS PORT Right 2/11/2025    Procedure: [id number];  Surgeon: Artur Gomez MD;  Location: [facility] OR;  Service: Peripheral Vascular;  Laterality: Right;     "INSERTION, STENT, BARE METAL, ARTERY, PERIPHERAL  2019    PERIPHERALLY INSERTED CENTRAL CATHETER INSERTION N/A 10/14/2024    Procedure: Insertion-picc;  Surgeon: Lejeune, Elizabeth Kennedy, MD;  Location: [facility] OR;  Service: Oncology;  Laterality: N/A;    THORACENTESIS  09/21/2024    x3     Social History     Socioeconomic History    Marital status:    Tobacco Use    Smoking status: Former     Current packs/day: 0.00     Types: Cigarettes     Quit date: 2/8/2022     Years since quitting: 3.1    Smokeless tobacco: Never   Substance and Sexual Activity    Alcohol use: Not Currently     Alcohol/week: 2.0 standard drinks of alcohol     Types: 2 Cans of beer per week    Drug use: Never    Sexual activity: Not Currently     Partners: Female     Birth control/protection: None     Social Drivers of Health     Financial Resource Strain: Low Risk  (11/10/2024)    Overall Financial Resource Strain (CARDIA)     Difficulty of Paying Living Expenses: Not hard at all   Food Insecurity: No Food Insecurity (11/10/2024)    Hunger Vital Sign     Worried About Running Out of Food in the Last Year: Never true     Ran Out of Food in the Last Year: Never true   Transportation Needs: No Transportation Needs (10/1/2024)    TRANSPORTATION NEEDS     Transportation : No   Physical Activity: Inactive (11/10/2024)    Exercise Vital Sign     Days of Exercise per Week: 0 days     Minutes of Exercise per Session: 10 min   Stress: No Stress Concern Present (11/10/2024)    [employer] - Occupational Stress Questionnaire     Feeling of Stress : Only a little   Housing Stability: Unknown (11/10/2024)    Housing Stability Vital Sign     Unable to Pay for Housing in the Last Year: No     Homeless in the Last Year: No      Family History   Problem Relation Name Age of Onset    Anesthesia problems Cousin          "trouble waking up after"      Review of patient's allergies indicates:  No Known Allergies   Review of " Systems   Constitutional:  Negative for activity change, appetite change, chills, fatigue, fever and unexpected weight change.   HENT:  Negative for mouth sores and sore throat.    Eyes:  Negative for visual disturbance.   Respiratory:  Negative for cough and shortness of breath.    Cardiovascular:  Negative for chest pain.   Gastrointestinal:  Negative for abdominal pain, constipation, diarrhea, nausea and vomiting.   Endocrine: Negative for polyuria.   Genitourinary:  Negative for dysuria and frequency.   Musculoskeletal:  Negative for back pain.   Integumentary:  Negative for rash.   Allergic/Immunologic: Negative for frequent infections.   Neurological:  Negative for weakness and headaches.   Hematological:  Negative for adenopathy. Does not bruise/bleed easily.   Psychiatric/Behavioral:  Positive for depressed mood. The patient is nervous/anxious.          Objective:      Vitals:    04/16/25 1022   BP: 127/73   Pulse: 94   Resp: 18   Temp: 97.4 °F (36.3 °C)           Physical Exam  Constitutional:       General: He is not in acute distress.     Appearance: Normal appearance. He is not ill-appearing.   HENT:      Head: Normocephalic and atraumatic.      Nose: Nose normal.      Mouth/Throat:      Mouth: Mucous membranes are moist.      Pharynx: Oropharynx is clear.   Eyes:      Extraocular Movements: Extraocular movements intact.      Conjunctiva/sclera: Conjunctivae normal.      Pupils: Pupils are equal, round, and reactive to light.   Cardiovascular:      Rate and Rhythm: Normal rate and regular rhythm.      Pulses: Normal pulses.      Heart sounds: Normal heart sounds. No murmur heard.  Pulmonary:      Effort: Pulmonary effort is normal. No respiratory distress.   Abdominal:      General: There is no distension.      Palpations: Abdomen is soft.      Tenderness: There is no abdominal tenderness.   Musculoskeletal:         General: Deformity (R BKA) present. Normal range of motion.      Cervical back: Normal  range of motion and neck supple.      Right lower leg: No edema.      Left lower leg: No edema.   Lymphadenopathy:      Cervical: No cervical adenopathy.   Skin:     General: Skin is warm and dry.   Neurological:      General: No focal deficit present.      Mental Status: He is alert and oriented to person, place, and time.         LABS AND IMAGING REVIEWED IN EPIC    IMAGIN/10/25 MRI Brain:  Impression:  1. Generalized cerebral and cerebellar atrophy  2. Scattered foci of abnormal signal intensity at the periventricular and subcortical white matter suspicious for foci of ischemia versus gliosis  3. Motion artifact  4. Patient refused intravenous contrast which limits the exam    25 PET/CT:  Impression:  1. Good treatment response with significantly improved right perihilar mass and mediastinal adenopathy.  2. Moderate right pleural effusion.      PATHOLOGY:  10/2/24 EBUS:  FINAL DIAGNOSIS   1. LYMPH NODE 4R, EBUS-GUIDED BIOPSY:   METASTATIC SMALL CELL CARCINOMA (HIGH GRADE NEUROENDOCRINE CARCINOMA).   IMMUNOHISTOCHEMICAL STAINS PERFORMED WITH ADEQUATE CONTROLS:   KI-67, LOW MOLECULAR WEIGHT CYTOKERATIN, CD56, SYNAPTOPHYSIN, TTF-1 AND   CHROMOGRANIN:  INCONCLUSIVE DUE TO EXTENSIVE NECROSIS.      2. RIGHT LUNG, UPPER LOBE, BIOPSY:   HIGH GRADE NEUROENDOCRINE CARCINOMA (SMALL CELL CARCINOMA).   IMMUNOHISTOCHEMICAL STAINS PERFORMED WITH ADEQUATE CONTROLS:   CD56, LOW MOLECULAR WEIGHT CYTOKERATIN, SYNAPTOPHYSIN, CHROMOGRANIN AND TTF-1:   STRONGLY POSITIVE IN MALIGNANT CELLS.   KI-67:  SHOWS NEAR 100% NUCLEAR POSITIVITY IN MALIGNANT CELLS.     Assessment:   Extensive Stage R Lung SCLC - with recurrent pleural effusions and near obstructing R mainstem mass. Treatment history as above. Good response to Carbo/Etop/Atezo, have now proceeded with Atezolizumab alone.       Plan:   - Toxicity reviewed; okay to proceed with C8 of Atezo alone pending [facility]  - Continue to follow Palliative as needed  - Repeat scans after  C8; ordered today  - RTC visit, labs same day prior to C9    Call Daughter with all appts/information    I spent a total of 40 minutes on the day of the visit.This includes face to face time and non-face to face time preparing to see the patient (eg, review of tests), obtaining and/or reviewing separately obtained history, documenting clinical information in the electronic or other health record, independently interpreting results and communicating results to the patient/family/caregiver, or care coordinator.    Visit today included increased complexity associated with the care of the episodic problem Extensive Stage R Lung SCLC, addressing and managing the longitudinal care of the patient's Extensive Stage R Lung SCLC.           CLEMENTINA Don-C  Hematology/Oncology   Cancer Center [facility]

## 2025-04-16 ENCOUNTER — LAB VISIT (OUTPATIENT)
Dept: LAB | Facility: HOSPITAL | Age: 80
End: 2025-04-16
Attending: STUDENT IN AN ORGANIZED HEALTH CARE EDUCATION/TRAINING PROGRAM
Payer: MEDICARE

## 2025-04-16 ENCOUNTER — OFFICE VISIT (OUTPATIENT)
Dept: HEMATOLOGY/ONCOLOGY | Facility: CLINIC | Age: 80
End: 2025-04-16
Payer: MEDICARE

## 2025-04-16 VITALS
OXYGEN SATURATION: 93 % | HEIGHT: 63 IN | DIASTOLIC BLOOD PRESSURE: 73 MMHG | BODY MASS INDEX: 31.92 KG/M2 | WEIGHT: 180.13 LBS | HEART RATE: 94 BPM | TEMPERATURE: 97 F | SYSTOLIC BLOOD PRESSURE: 127 MMHG | RESPIRATION RATE: 18 BRPM

## 2025-04-16 DIAGNOSIS — Z79.899 LONG-TERM CURRENT USE OF HIGH RISK MEDICATION OTHER THAN ANTICOAGULANT: ICD-10-CM

## 2025-04-16 DIAGNOSIS — C7A.1 SMALL CELL NEUROENDOCRINE CARCINOMA OF LUNG: Primary | ICD-10-CM

## 2025-04-16 DIAGNOSIS — C7A.1 SMALL CELL NEUROENDOCRINE CARCINOMA OF LUNG: ICD-10-CM

## 2025-04-16 LAB
ALBUMIN SERPL-MCNC: 3.5 G/DL (ref 3.4–4.8)
ALBUMIN/GLOB SERPL: 0.9 RATIO (ref 1.1–2)
ALP SERPL-CCNC: 88 UNIT/L (ref 40–150)
ALT SERPL-CCNC: 9 UNIT/L (ref 0–55)
ANION GAP SERPL CALC-SCNC: 8 MEQ/L
AST SERPL-CCNC: 12 UNIT/L (ref 11–45)
BASOPHILS # BLD AUTO: 0.04 X10(3)/MCL
BASOPHILS NFR BLD AUTO: 0.5 %
BILIRUB SERPL-MCNC: 0.4 MG/DL
BUN SERPL-MCNC: 7.8 MG/DL (ref 8.4–25.7)
CALCIUM SERPL-MCNC: 9.4 MG/DL (ref 8.8–10)
CHLORIDE SERPL-SCNC: 109 MMOL/L (ref 98–107)
CO2 SERPL-SCNC: 27 MMOL/L (ref 23–31)
CREAT SERPL-MCNC: 0.72 MG/DL (ref 0.72–1.25)
CREAT/UREA NIT SERPL: 11
EOSINOPHIL # BLD AUTO: 0.01 X10(3)/MCL (ref 0–0.9)
EOSINOPHIL NFR BLD AUTO: 0.1 %
ERYTHROCYTE [DISTWIDTH] IN BLOOD BY AUTOMATED COUNT: 12.4 % (ref 11.5–17)
GFR SERPLBLD CREATININE-BSD FMLA CKD-EPI: >60 ML/MIN/1.73/M2
GLOBULIN SER-MCNC: 4 GM/DL (ref 2.4–3.5)
GLUCOSE SERPL-MCNC: 72 MG/DL (ref 82–115)
HCT VFR BLD AUTO: 41 % (ref 42–52)
HGB BLD-MCNC: 13.7 G/DL (ref 14–18)
IMM GRANULOCYTES # BLD AUTO: 0.01 X10(3)/MCL (ref 0–0.04)
IMM GRANULOCYTES NFR BLD AUTO: 0.1 %
LYMPHOCYTES # BLD AUTO: 1.26 X10(3)/MCL (ref 0.6–4.6)
LYMPHOCYTES NFR BLD AUTO: 16.2 %
MCH RBC QN AUTO: 29.7 PG (ref 27–31)
MCHC RBC AUTO-ENTMCNC: 33.4 G/DL (ref 33–36)
MCV RBC AUTO: 88.9 FL (ref 80–94)
MONOCYTES # BLD AUTO: 0.83 X10(3)/MCL (ref 0.1–1.3)
MONOCYTES NFR BLD AUTO: 10.7 %
NEUTROPHILS # BLD AUTO: 5.61 X10(3)/MCL (ref 2.1–9.2)
NEUTROPHILS NFR BLD AUTO: 72.4 %
PLATELET # BLD AUTO: 164 X10(3)/MCL (ref 130–400)
PMV BLD AUTO: 9.7 FL (ref 7.4–10.4)
POTASSIUM SERPL-SCNC: 4 MMOL/L (ref 3.5–5.1)
PROT SERPL-MCNC: 7.5 GM/DL (ref 5.8–7.6)
RBC # BLD AUTO: 4.61 X10(6)/MCL (ref 4.7–6.1)
SODIUM SERPL-SCNC: 144 MMOL/L (ref 136–145)
TSH SERPL-ACNC: 0.92 UIU/ML (ref 0.35–4.94)
WBC # BLD AUTO: 7.76 X10(3)/MCL (ref 4.5–11.5)

## 2025-04-16 PROCEDURE — G2211 COMPLEX E/M VISIT ADD ON: HCPCS | Mod: S$PBB,,,

## 2025-04-16 PROCEDURE — 99215 OFFICE O/P EST HI 40 MIN: CPT | Mod: PBBFAC

## 2025-04-16 PROCEDURE — 36415 COLL VENOUS BLD VENIPUNCTURE: CPT

## 2025-04-16 PROCEDURE — 80053 COMPREHEN METABOLIC PANEL: CPT

## 2025-04-16 PROCEDURE — 84443 ASSAY THYROID STIM HORMONE: CPT

## 2025-04-16 PROCEDURE — 85025 COMPLETE CBC W/AUTO DIFF WBC: CPT

## 2025-04-16 PROCEDURE — 99215 OFFICE O/P EST HI 40 MIN: CPT | Mod: S$PBB,,,

## 2025-04-16 PROCEDURE — 99999 PR PBB SHADOW E&M-EST. PATIENT-LVL V: CPT | Mod: PBBFAC,,,

## 2025-04-17 ENCOUNTER — INFUSION (OUTPATIENT)
Dept: INFUSION THERAPY | Facility: HOSPITAL | Age: 80
End: 2025-04-17
Attending: STUDENT IN AN ORGANIZED HEALTH CARE EDUCATION/TRAINING PROGRAM
Payer: MEDICARE

## 2025-04-17 VITALS
HEART RATE: 85 BPM | OXYGEN SATURATION: 90 % | DIASTOLIC BLOOD PRESSURE: 63 MMHG | SYSTOLIC BLOOD PRESSURE: 109 MMHG | TEMPERATURE: 98 F

## 2025-04-17 DIAGNOSIS — C7A.1 SMALL CELL NEUROENDOCRINE CARCINOMA OF LUNG: Primary | ICD-10-CM

## 2025-04-17 PROCEDURE — 96413 CHEMO IV INFUSION 1 HR: CPT

## 2025-04-17 PROCEDURE — A4216 STERILE WATER/SALINE, 10 ML: HCPCS

## 2025-04-17 PROCEDURE — 25000003 PHARM REV CODE 250

## 2025-04-17 PROCEDURE — 63600175 PHARM REV CODE 636 W HCPCS: Mod: JZ,TB

## 2025-04-17 RX ORDER — SODIUM CHLORIDE 0.9 % (FLUSH) 0.9 %
10 SYRINGE (ML) INJECTION
Status: DISCONTINUED | OUTPATIENT
Start: 2025-04-17 | End: 2025-04-17 | Stop reason: HOSPADM

## 2025-04-17 RX ORDER — HEPARIN 100 UNIT/ML
500 SYRINGE INTRAVENOUS
Status: DISCONTINUED | OUTPATIENT
Start: 2025-04-17 | End: 2025-04-17 | Stop reason: HOSPADM

## 2025-04-17 RX ORDER — PROCHLORPERAZINE EDISYLATE 5 MG/ML
5 INJECTION INTRAMUSCULAR; INTRAVENOUS ONCE AS NEEDED
Status: DISCONTINUED | OUTPATIENT
Start: 2025-04-17 | End: 2025-04-17 | Stop reason: HOSPADM

## 2025-04-17 RX ORDER — DIPHENHYDRAMINE HYDROCHLORIDE 50 MG/ML
50 INJECTION, SOLUTION INTRAMUSCULAR; INTRAVENOUS ONCE AS NEEDED
Status: DISCONTINUED | OUTPATIENT
Start: 2025-04-17 | End: 2025-04-17 | Stop reason: HOSPADM

## 2025-04-17 RX ORDER — EPINEPHRINE 0.3 MG/.3ML
0.3 INJECTION SUBCUTANEOUS ONCE AS NEEDED
Status: DISCONTINUED | OUTPATIENT
Start: 2025-04-17 | End: 2025-04-17 | Stop reason: HOSPADM

## 2025-04-17 RX ADMIN — ATEZOLIZUMAB 1200 MG: 1200 INJECTION, SOLUTION INTRAVENOUS at 10:04

## 2025-04-17 RX ADMIN — HEPARIN 500 UNITS: 100 SYRINGE at 11:04

## 2025-04-17 RX ADMIN — SODIUM CHLORIDE: 9 INJECTION, SOLUTION INTRAVENOUS at 10:04

## 2025-04-17 RX ADMIN — Medication 10 ML: at 11:04

## 2025-05-02 ENCOUNTER — HOSPITAL ENCOUNTER (OUTPATIENT)
Dept: RADIOLOGY | Facility: HOSPITAL | Age: 80
Discharge: HOME | End: 2025-05-02
Payer: MEDICARE

## 2025-05-02 DIAGNOSIS — C7A.1 SMALL CELL NEUROENDOCRINE CARCINOMA OF LUNG: ICD-10-CM

## 2025-05-02 PROCEDURE — 78815 PET IMAGE W/CT SKULL-THIGH: CPT | Mod: TC

## 2025-05-02 PROCEDURE — A9552 F18 FDG: HCPCS

## 2025-05-02 RX ORDER — FLUDEOXYGLUCOSE F18 500 MCI/ML
10 INJECTION INTRAVENOUS
Status: COMPLETED | OUTPATIENT
Start: 2025-05-02 | End: 2025-05-02

## 2025-05-02 RX ADMIN — FLUDEOXYGLUCOSE F-18 11 MILLICURIE: 500 INJECTION INTRAVENOUS at 09:05

## 2025-05-05 ENCOUNTER — HOSPITAL ENCOUNTER (OUTPATIENT)
Dept: RADIOLOGY | Facility: HOSPITAL | Age: 80
Discharge: HOME | End: 2025-05-05
Payer: MEDICARE

## 2025-05-05 DIAGNOSIS — C7A.1 SMALL CELL NEUROENDOCRINE CARCINOMA OF LUNG: ICD-10-CM

## 2025-05-05 PROCEDURE — 25500020 PHARM REV CODE 255

## 2025-05-05 PROCEDURE — 70553 MRI BRAIN STEM W/O & W/DYE: CPT | Mod: TC

## 2025-05-05 PROCEDURE — A9577 INJ MULTIHANCE: HCPCS

## 2025-05-05 RX ADMIN — GADOBENATE DIMEGLUMINE 15 ML: 529 INJECTION, SOLUTION INTRAVENOUS at 10:05

## 2025-05-08 NOTE — PROGRESS NOTES
Subjective:       Patient ID: Gerald J Lejeune is a 79 y.o. male.    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 He has a 150+ pack year smoking history, quit 1 year prior to presentation, on 3-4L NC at baseline. He presented to [facility] in September '24 for new onset seizures. Workup was done which was unrevealing of etiology, but during that workup he was found to have large volume R pleural effusion, large mediastinal LAD, and R mainstem mucous plugging vs mass obstruction. He has had his R sided pleural effusion drained twice due to quick reaccumulation, cytology negative x2. He underwent EBUS on 10/2 where within the R mainstem bronchus a near complete occlusion due to fungating mass was noted. Biopsy was done with final pathology revealed grade 3 neuroendocrine carcinoma consistent with small cell lung cancer.     His diagnosis, staging, and prognosis. The NCCN guidelines when discussing treatment therapy were referenced. The common side effects of chemotherapy and immunotherapy in detail including fatigue, nausea, vomiting, constipation, diarrhea, and increased risk of infections were explained. His median OS with and without treatment was discussed.  The role of palliative care as a supplement to treatment to support him and his family with the symptoms of his cancer was also discussed. They were agreeable to proceed.     October '24 PET with evidence of extensive disease to R lobe with pleural effusion and hypermetabolic LAD. No evidence of disease outside of the chest. He was transitioned into 1st line carboplatin AUC 4, Etoposide 80mg/m2, and Atezolizumab 1200mg Q3w x 4 cycles in the palliative setting then repeat PET to assess response.  Chemotherapy dose reduced due to patients age, comorbidities, and performance status.     Interval History:   Patient presents to clinic today for MD follow up appointment and labs for toxicity check and scan results prior to C9 of  Atezo alone on 5/15.  He states he is doing okay today.  Complains of rash to lower abdomen he is treating with triamcinolone cream without improvement.   Discussed labs and scans in detail with patient.  Was able to mow his grass yesterday.   Denies any diarrhea, worsening cough or dyspnea.  Otherwise, he has no further complaints or concerns today.    Past Medical History:   Diagnosis Date    Adenopathy     Anesthesia complication     takes longer to wake    Anxiety     BPH (benign prostatic hyperplasia)     COPD (chronic obstructive pulmonary disease)     Dementia     Depression     Dizziness     Does use hearing aid     Hyperlipidemia     Hypertension     Insomnia     Obesity     Oxygen dependent     4L/NC prn    PAD (peripheral artery disease)     Post-obstructive pneumonia due to foreign body aspiration     Levaquin    Seizures     Shortness of breath     Sleep apnea     uses CPAP    Small cell neuroendocrine carcinoma of lung     Use of cane as ambulatory aid     Walker as ambulation aid       Past Surgical History:   Procedure Laterality Date    BELOW KNEE AMPUTATION OF LOWER EXTREMITY Right     BRONCHOSCOPY, WITH TRANSBRONCHIAL BIOPSY Right 10/02/2024    Procedure: BRONCHOSCOPY, WITH TRANSBRONCHIAL BIOPSY;  Surgeon: Lee Suarez Jr., MD, [facility];  Location: [facility] BRONCHOSCOPY LAB;  Service: Pulmonary;  Laterality: Right;  RUL    CATARACT EXTRACTION      ENDOBRONCHIAL ULTRASOUND N/A 10/02/2024    Procedure: ENDOBRONCHIAL ULTRASOUND (EBUS)  with FLURO;  Surgeon: Lee Suarez Jr., MD, [facility];  Location: [facility] BRONCHOSCOPY LAB;  Service: Pulmonary;  Laterality: N/A;    INSERTION OF TUNNELED CENTRAL VENOUS CATHETER (CVC) WITH SUBCUTANEOUS PORT Right 2/11/2025    Procedure:  [id number];  Surgeon: Artur Gomez MD;  Location: [facility] OR;  Service: Peripheral Vascular;  Laterality: Right;    INSERTION, STENT, BARE METAL, ARTERY, PERIPHERAL  2019    PERIPHERALLY INSERTED CENTRAL CATHETER INSERTION N/A 10/14/2024    Procedure: Insertion-picc;  Surgeon: Lejeune, Elizabeth Kennedy, MD;  Location: [facility] OR;  Service: Oncology;  Laterality: N/A;    THORACENTESIS  09/21/2024    x3     Social History     Socioeconomic History    Marital status:    Tobacco Use    Smoking status: Former     Current packs/day: 0.00     Types: Cigarettes     Quit date: 2/8/2022     Years since quitting: 3.2    Smokeless tobacco: Never   Substance and Sexual Activity    Alcohol use: Not Currently     Alcohol/week: 2.0 standard drinks of alcohol     Types: 2 Cans of beer per week    Drug use: Never    Sexual activity: Not Currently     Partners: Female     Birth control/protection: None     Social Drivers of Health     Financial Resource Strain: Low Risk  (11/10/2024)    Overall Financial Resource Strain (CARDIA)     Difficulty of Paying Living Expenses: Not hard at all   Food Insecurity: No Food Insecurity (11/10/2024)    Hunger Vital Sign     Worried About Running Out of Food in the Last Year: Never true     Ran Out of Food in the Last Year: Never true   Transportation Needs: No Transportation Needs (10/1/2024)    TRANSPORTATION NEEDS     Transportation : No   Physical Activity: Inactive (11/10/2024)    Exercise Vital Sign     Days of Exercise per Week: 0 days     Minutes of Exercise per Session: 10 min   Stress: No Stress Concern Present (11/10/2024)    [employer] [place] [employer] - Occupational Stress Questionnaire     Feeling of Stress : Only a little   Housing Stability: Unknown (11/10/2024)    Housing Stability Vital Sign     Unable to Pay for Housing in the Last Year: No     Homeless in the Last Year: No      Family History   Problem Relation Name Age of Onset    Anesthesia  "problems Cousin          "trouble waking up after"      Review of patient's allergies indicates:  No Known Allergies   Review of Systems   Constitutional:  Negative for activity change, appetite change, chills, fatigue, fever and unexpected weight change.   HENT:  Negative for mouth sores and sore throat.    Eyes:  Negative for visual disturbance.   Respiratory:  Negative for cough and shortness of breath.    Cardiovascular:  Negative for chest pain.   Gastrointestinal:  Negative for abdominal pain, constipation, diarrhea, nausea and vomiting.   Endocrine: Negative for polyuria.   Genitourinary:  Negative for dysuria and frequency.   Musculoskeletal:  Negative for back pain.   Integumentary:  Negative for rash.   Allergic/Immunologic: Negative for frequent infections.   Neurological:  Negative for weakness and headaches.   Hematological:  Negative for adenopathy. Does not bruise/bleed easily.   Psychiatric/Behavioral:  Positive for depressed mood. The patient is nervous/anxious.          Objective:      Vitals:    05/13/25 0826   BP: 130/75   Pulse: 87   Resp: 18   Temp: 97.8 °F (36.6 °C)       Physical Exam  Constitutional:       General: He is not in acute distress.     Appearance: Normal appearance. He is not ill-appearing.   HENT:      Head: Normocephalic and atraumatic.      Nose: Nose normal.      Mouth/Throat:      Mouth: Mucous membranes are moist.      Pharynx: Oropharynx is clear.   Eyes:      Extraocular Movements: Extraocular movements intact.      Conjunctiva/sclera: Conjunctivae normal.      Pupils: Pupils are equal, round, and reactive to light.   Cardiovascular:      Rate and Rhythm: Normal rate and regular rhythm.      Pulses: Normal pulses.      Heart sounds: Normal heart sounds. No murmur heard.  Pulmonary:      Effort: Pulmonary effort is normal. No respiratory distress.   Abdominal:      General: There is no distension.      Palpations: Abdomen is soft.      Tenderness: There is no abdominal " tenderness.   Musculoskeletal:         General: Deformity (R BKA) present. Normal range of motion.      Cervical back: Normal range of motion and neck supple.      Right lower leg: No edema.      Left lower leg: No edema.   Lymphadenopathy:      Cervical: No cervical adenopathy.   Skin:     General: Skin is warm and dry.      Findings: Rash (Erythematous fungal appears rash to skin fold underneath panus) present.   Neurological:      General: No focal deficit present.      Mental Status: He is alert and oriented to person, place, and time.         LABS AND IMAGING REVIEWED IN EPIC    IMAGIN25 MRI Brain:  Impression:  1. Generalized cerebral and cerebellar atrophy  2. Scattered foci of abnormal signal intensity again noted to the periventricular and subcortical white matter suspicious for foci of ischemia versus gliosis  3. Findings and other details as above    25 PET/CT:  Impression:  1. Interval decreased size and FDG avidity of right paratracheal lymph node anterior to the mainstem bronchus  2. Improved dependent consolidative opacity in the right upper lobe with resolved hypermetabolism.  3. Improved right pleural effusion.  4. No new foci of disease seen.    PATHOLOGY:  10/2/24 EBUS:  FINAL DIAGNOSIS   1. LYMPH NODE 4R, EBUS-GUIDED BIOPSY:   METASTATIC SMALL CELL CARCINOMA (HIGH GRADE NEUROENDOCRINE CARCINOMA).   IMMUNOHISTOCHEMICAL STAINS PERFORMED WITH ADEQUATE CONTROLS:   KI-67, LOW MOLECULAR WEIGHT CYTOKERATIN, CD56, SYNAPTOPHYSIN, TTF-1 AND   CHROMOGRANIN:  INCONCLUSIVE DUE TO EXTENSIVE NECROSIS.      2. RIGHT LUNG, UPPER LOBE, BIOPSY:   HIGH GRADE NEUROENDOCRINE CARCINOMA (SMALL CELL CARCINOMA).   IMMUNOHISTOCHEMICAL STAINS PERFORMED WITH ADEQUATE CONTROLS:   CD56, LOW MOLECULAR WEIGHT CYTOKERATIN, SYNAPTOPHYSIN, CHROMOGRANIN AND TTF-1:   STRONGLY POSITIVE IN MALIGNANT CELLS.   KI-67:  SHOWS NEAR 100% NUCLEAR POSITIVITY IN MALIGNANT CELLS.     Assessment:   Extensive Stage R Lung SCLC - with  recurrent pleural effusions and near obstructing R mainstem mass. Treatment history as above. Good response to Carbo/Etop/Atezo, has now proceeded with Atezolizumab alone.     Immunodeficiency due to Drug Therapy - Precautions discussed with patient. Continue to monitor for any signs of symptoms of infection. No prophylaxis needed at this time. No role for growth factor.     Panus Rash - Appears fungal in nature. Instructed to stop steroid cream. Will Rx antifungal cream BID. Instructed to call if no improvement.     Plan:   - Toxicity reviewed; okay to proceed with C9 of Atezolizumab on 5/15; pending CMP  - Scans reviewed and discussed in detail  - Continue to follow Palliative as needed  - Rx Nystatin and baby powder for rash   - RTC 3 weeks for NP visit, labs same day prior to C10  - Repeat PET and MRI Brain prior to C13    Call Daughter with all appts/information    I spent a total of 40 minutes on the day of the visit.This includes face to face time and non-face to face time preparing to see the patient (eg, review of tests), obtaining and/or reviewing separately obtained history, documenting clinical information in the electronic or other health record, independently interpreting results and communicating results to the patient/family/caregiver, or care coordinator.    Patient requires or will require continuous, longitudinal, and active collaborative plan of care related to this patient's health condition, Extensive Stage R Lung SCLC - the management of which requires the direction of a practitioner with specialized clinical knowledge, skill, and expertise.     Elizabeth Lejeune, MD  Hematology/Oncology   [facility]    Professional Services   I, Yanelis Galdamez LPN, acted solely as a scribe for and in the presence of Dr. Elizabeth Kennedy LeJeune, who performed these services.

## 2025-05-13 ENCOUNTER — OFFICE VISIT (OUTPATIENT)
Dept: HEMATOLOGY/ONCOLOGY | Facility: CLINIC | Age: 80
End: 2025-05-13
Payer: MEDICARE

## 2025-05-13 ENCOUNTER — LAB VISIT (OUTPATIENT)
Dept: LAB | Facility: HOSPITAL | Age: 80
End: 2025-05-13
Attending: STUDENT IN AN ORGANIZED HEALTH CARE EDUCATION/TRAINING PROGRAM
Payer: MEDICARE

## 2025-05-13 VITALS
BODY MASS INDEX: 31.8 KG/M2 | HEART RATE: 87 BPM | TEMPERATURE: 98 F | OXYGEN SATURATION: 92 % | WEIGHT: 179.5 LBS | HEIGHT: 63 IN | RESPIRATION RATE: 18 BRPM | DIASTOLIC BLOOD PRESSURE: 75 MMHG | SYSTOLIC BLOOD PRESSURE: 130 MMHG

## 2025-05-13 DIAGNOSIS — C7A.1 SMALL CELL NEUROENDOCRINE CARCINOMA OF LUNG: ICD-10-CM

## 2025-05-13 DIAGNOSIS — Z79.899 LONG-TERM CURRENT USE OF HIGH RISK MEDICATION OTHER THAN ANTICOAGULANT: ICD-10-CM

## 2025-05-13 DIAGNOSIS — B36.9 FUNGAL RASH OF TRUNK: Primary | ICD-10-CM

## 2025-05-13 DIAGNOSIS — D84.821 IMMUNODEFICIENCY DUE TO DRUG THERAPY: ICD-10-CM

## 2025-05-13 DIAGNOSIS — Z79.899 IMMUNODEFICIENCY DUE TO DRUG THERAPY: ICD-10-CM

## 2025-05-13 LAB
ALBUMIN SERPL-MCNC: 3.6 G/DL (ref 3.4–4.8)
ALBUMIN/GLOB SERPL: 1 RATIO (ref 1.1–2)
ALP SERPL-CCNC: 84 UNIT/L (ref 40–150)
ALT SERPL-CCNC: 9 UNIT/L (ref 0–55)
ANION GAP SERPL CALC-SCNC: 12 MEQ/L
AST SERPL-CCNC: 12 UNIT/L (ref 11–45)
BASOPHILS # BLD AUTO: 0.03 X10(3)/MCL
BASOPHILS NFR BLD AUTO: 0.4 %
BILIRUB SERPL-MCNC: 0.5 MG/DL
BUN SERPL-MCNC: 12.5 MG/DL (ref 8.4–25.7)
CALCIUM SERPL-MCNC: 9.4 MG/DL (ref 8.8–10)
CHLORIDE SERPL-SCNC: 108 MMOL/L (ref 98–107)
CO2 SERPL-SCNC: 23 MMOL/L (ref 23–31)
CREAT SERPL-MCNC: 0.76 MG/DL (ref 0.72–1.25)
CREAT/UREA NIT SERPL: 16
EOSINOPHIL # BLD AUTO: 0.01 X10(3)/MCL (ref 0–0.9)
EOSINOPHIL NFR BLD AUTO: 0.1 %
ERYTHROCYTE [DISTWIDTH] IN BLOOD BY AUTOMATED COUNT: 14.5 % (ref 11.5–17)
GFR SERPLBLD CREATININE-BSD FMLA CKD-EPI: >60 ML/MIN/1.73/M2
GLOBULIN SER-MCNC: 3.7 GM/DL (ref 2.4–3.5)
GLUCOSE SERPL-MCNC: 98 MG/DL (ref 82–115)
HCT VFR BLD AUTO: 39.7 % (ref 42–52)
HGB BLD-MCNC: 13.3 G/DL (ref 14–18)
IMM GRANULOCYTES # BLD AUTO: 0.01 X10(3)/MCL (ref 0–0.04)
IMM GRANULOCYTES NFR BLD AUTO: 0.1 %
LYMPHOCYTES # BLD AUTO: 1.51 X10(3)/MCL (ref 0.6–4.6)
LYMPHOCYTES NFR BLD AUTO: 21.1 %
MCH RBC QN AUTO: 29.5 PG (ref 27–31)
MCHC RBC AUTO-ENTMCNC: 33.5 G/DL (ref 33–36)
MCV RBC AUTO: 88 FL (ref 80–94)
MONOCYTES # BLD AUTO: 0.7 X10(3)/MCL (ref 0.1–1.3)
MONOCYTES NFR BLD AUTO: 9.8 %
NEUTROPHILS # BLD AUTO: 4.89 X10(3)/MCL (ref 2.1–9.2)
NEUTROPHILS NFR BLD AUTO: 68.5 %
PLATELET # BLD AUTO: 135 X10(3)/MCL (ref 130–400)
PMV BLD AUTO: 9.8 FL (ref 7.4–10.4)
POTASSIUM SERPL-SCNC: 4.4 MMOL/L (ref 3.5–5.1)
PROT SERPL-MCNC: 7.3 GM/DL (ref 5.8–7.6)
RBC # BLD AUTO: 4.51 X10(6)/MCL (ref 4.7–6.1)
SODIUM SERPL-SCNC: 143 MMOL/L (ref 136–145)
TSH SERPL-ACNC: 0.81 UIU/ML (ref 0.35–4.94)
WBC # BLD AUTO: 7.15 X10(3)/MCL (ref 4.5–11.5)

## 2025-05-13 PROCEDURE — 36415 COLL VENOUS BLD VENIPUNCTURE: CPT

## 2025-05-13 PROCEDURE — 80053 COMPREHEN METABOLIC PANEL: CPT

## 2025-05-13 PROCEDURE — 85025 COMPLETE CBC W/AUTO DIFF WBC: CPT

## 2025-05-13 PROCEDURE — 84443 ASSAY THYROID STIM HORMONE: CPT

## 2025-05-13 PROCEDURE — 99214 OFFICE O/P EST MOD 30 MIN: CPT | Mod: PBBFAC | Performed by: STUDENT IN AN ORGANIZED HEALTH CARE EDUCATION/TRAINING PROGRAM

## 2025-05-13 PROCEDURE — 99215 OFFICE O/P EST HI 40 MIN: CPT | Mod: S$PBB,,, | Performed by: STUDENT IN AN ORGANIZED HEALTH CARE EDUCATION/TRAINING PROGRAM

## 2025-05-13 PROCEDURE — G2211 COMPLEX E/M VISIT ADD ON: HCPCS | Mod: S$PBB,,, | Performed by: STUDENT IN AN ORGANIZED HEALTH CARE EDUCATION/TRAINING PROGRAM

## 2025-05-13 PROCEDURE — 99999 PR PBB SHADOW E&M-EST. PATIENT-LVL IV: CPT | Mod: PBBFAC,,, | Performed by: STUDENT IN AN ORGANIZED HEALTH CARE EDUCATION/TRAINING PROGRAM

## 2025-05-13 RX ORDER — NYSTATIN 100000 U/G
CREAM TOPICAL 2 TIMES DAILY
Qty: 15 G | Refills: 1 | Status: SHIPPED | OUTPATIENT
Start: 2025-05-13

## 2025-05-13 RX ORDER — EPINEPHRINE 0.3 MG/.3ML
0.3 INJECTION SUBCUTANEOUS ONCE AS NEEDED
Status: CANCELLED | OUTPATIENT
Start: 2025-05-13

## 2025-05-13 RX ORDER — HEPARIN 100 UNIT/ML
500 SYRINGE INTRAVENOUS
Status: CANCELLED | OUTPATIENT
Start: 2025-05-13

## 2025-05-13 RX ORDER — PROCHLORPERAZINE EDISYLATE 5 MG/ML
5 INJECTION INTRAMUSCULAR; INTRAVENOUS ONCE AS NEEDED
Status: CANCELLED | OUTPATIENT
Start: 2025-05-13

## 2025-05-13 RX ORDER — DIPHENHYDRAMINE HYDROCHLORIDE 50 MG/ML
50 INJECTION, SOLUTION INTRAMUSCULAR; INTRAVENOUS ONCE AS NEEDED
Status: CANCELLED | OUTPATIENT
Start: 2025-05-13

## 2025-05-13 RX ORDER — SODIUM CHLORIDE 0.9 % (FLUSH) 0.9 %
10 SYRINGE (ML) INJECTION
Status: CANCELLED | OUTPATIENT
Start: 2025-05-13

## 2025-05-14 ENCOUNTER — INFUSION (OUTPATIENT)
Dept: INFUSION THERAPY | Facility: HOSPITAL | Age: 80
End: 2025-05-14
Attending: STUDENT IN AN ORGANIZED HEALTH CARE EDUCATION/TRAINING PROGRAM
Payer: MEDICARE

## 2025-05-14 VITALS
BODY MASS INDEX: 32.23 KG/M2 | RESPIRATION RATE: 16 BRPM | HEIGHT: 63 IN | HEART RATE: 80 BPM | DIASTOLIC BLOOD PRESSURE: 68 MMHG | SYSTOLIC BLOOD PRESSURE: 120 MMHG | OXYGEN SATURATION: 96 % | WEIGHT: 181.88 LBS | TEMPERATURE: 98 F

## 2025-05-14 DIAGNOSIS — C7A.1 SMALL CELL NEUROENDOCRINE CARCINOMA OF LUNG: Primary | ICD-10-CM

## 2025-05-14 PROCEDURE — 63600175 PHARM REV CODE 636 W HCPCS: Mod: JZ,TB | Performed by: STUDENT IN AN ORGANIZED HEALTH CARE EDUCATION/TRAINING PROGRAM

## 2025-05-14 PROCEDURE — 96413 CHEMO IV INFUSION 1 HR: CPT

## 2025-05-14 PROCEDURE — 25000003 PHARM REV CODE 250: Performed by: STUDENT IN AN ORGANIZED HEALTH CARE EDUCATION/TRAINING PROGRAM

## 2025-05-14 RX ORDER — HEPARIN 100 UNIT/ML
500 SYRINGE INTRAVENOUS
Status: DISCONTINUED | OUTPATIENT
Start: 2025-05-14 | End: 2025-05-14 | Stop reason: HOSPADM

## 2025-05-14 RX ORDER — DIPHENHYDRAMINE HYDROCHLORIDE 50 MG/ML
50 INJECTION, SOLUTION INTRAMUSCULAR; INTRAVENOUS ONCE AS NEEDED
Status: DISCONTINUED | OUTPATIENT
Start: 2025-05-14 | End: 2025-05-14 | Stop reason: HOSPADM

## 2025-05-14 RX ORDER — EPINEPHRINE 0.3 MG/.3ML
0.3 INJECTION SUBCUTANEOUS ONCE AS NEEDED
Status: DISCONTINUED | OUTPATIENT
Start: 2025-05-14 | End: 2025-05-14 | Stop reason: HOSPADM

## 2025-05-14 RX ORDER — SODIUM CHLORIDE 0.9 % (FLUSH) 0.9 %
10 SYRINGE (ML) INJECTION
Status: DISCONTINUED | OUTPATIENT
Start: 2025-05-14 | End: 2025-05-14 | Stop reason: HOSPADM

## 2025-05-14 RX ORDER — PROCHLORPERAZINE EDISYLATE 5 MG/ML
5 INJECTION INTRAMUSCULAR; INTRAVENOUS ONCE AS NEEDED
Status: DISCONTINUED | OUTPATIENT
Start: 2025-05-14 | End: 2025-05-14 | Stop reason: HOSPADM

## 2025-05-14 RX ADMIN — HEPARIN 500 UNITS: 100 SYRINGE at 12:05

## 2025-05-14 RX ADMIN — ATEZOLIZUMAB 1200 MG: 1200 INJECTION, SOLUTION INTRAVENOUS at 11:05

## 2025-06-06 NOTE — PROGRESS NOTES
Subjective:       Patient ID: Gerald J Lejeune is a 79 y.o. male.    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 He has a 150+ pack year smoking history, quit 1 year prior to presentation, on 3-4L NC at baseline. He presented to [facility] in September '24 for new onset seizures. Workup was done which was unrevealing of etiology, but during that workup he was found to have large volume R pleural effusion, large mediastinal LAD, and R mainstem mucous plugging vs mass obstruction. He has had his R sided pleural effusion drained twice due to quick reaccumulation, cytology negative x2. He underwent EBUS on 10/2 where within the R mainstem bronchus a near complete occlusion due to fungating mass was noted. Biopsy was done with final pathology revealed grade 3 neuroendocrine carcinoma consistent with small cell lung cancer.     His diagnosis, staging, and prognosis. The NCCN guidelines when discussing treatment therapy were referenced. The common side effects of chemotherapy and immunotherapy in detail including fatigue, nausea, vomiting, constipation, diarrhea, and increased risk of infections were explained. His median OS with and without treatment was discussed.  The role of palliative care as a supplement to treatment to support him and his family with the symptoms of his cancer was also discussed. They were agreeable to proceed.     October '24 PET with evidence of extensive disease to R lobe with pleural effusion and hypermetabolic LAD. No evidence of disease outside of the chest. He was transitioned into 1st line carboplatin AUC 4, Etoposide 80mg/m2, and Atezolizumab 1200mg Q3w x 4 cycles in the palliative setting then repeat PET to assess response.  Chemotherapy dose reduced due to patients age, comorbidities, and performance status.     Interval History:   Patient presents to clinic today for MD follow up appointment and labs for toxicity check and scan results prior to C10 of  Atezo alone on 6/12.  He states he is doing okay today.  Tolerating treatment with no major issue.  His appetite and energy level both remain stable.   Denies any abnormal cough or dyspnea.  Discussed labs and plan in detail with patient and spouse.  Overall, he has no major complaints or concerns today.     Past Medical History:   Diagnosis Date    Adenopathy     Anesthesia complication     takes longer to wake    Anxiety     BPH (benign prostatic hyperplasia)     COPD (chronic obstructive pulmonary disease)     Dementia     Depression     Dizziness     Does use hearing aid     Hyperlipidemia     Hypertension     Insomnia     Obesity     Oxygen dependent     4L/NC prn    PAD (peripheral artery disease)     Post-obstructive pneumonia due to foreign body aspiration     Levaquin    Seizures     Shortness of breath     Sleep apnea     uses CPAP    Small cell neuroendocrine carcinoma of lung     Use of cane as ambulatory aid     Walker as ambulation aid       Past Surgical History:   Procedure Laterality Date    BELOW KNEE AMPUTATION OF LOWER EXTREMITY Right     BRONCHOSCOPY, WITH TRANSBRONCHIAL BIOPSY Right 10/02/2024    Procedure: BRONCHOSCOPY, WITH TRANSBRONCHIAL BIOPSY;  Surgeon: Lee Suarez Jr., MD, [facility];  Location: [facility] BRONCHOSCOPY LAB;  Service: Pulmonary;  Laterality: Right;  RUL    CATARACT EXTRACTION      ENDOBRONCHIAL ULTRASOUND N/A 10/02/2024    Procedure: ENDOBRONCHIAL ULTRASOUND (EBUS)  with FLURO;  Surgeon: Lee Suarez Jr., MD, [facility];  Location: [facility] BRONCHOSCOPY LAB;  Service: Pulmonary;  Laterality: N/A;    INSERTION OF TUNNELED CENTRAL VENOUS CATHETER (CVC) WITH SUBCUTANEOUS PORT Right 2/11/2025    Procedure: [id number];  Surgeon: Artur Gomez MD;  " Location: [facility] OR;  Service: Peripheral Vascular;  Laterality: Right;    INSERTION, STENT, BARE METAL, ARTERY, PERIPHERAL  2019    PERIPHERALLY INSERTED CENTRAL CATHETER INSERTION N/A 10/14/2024    Procedure: Insertion-picc;  Surgeon: Lejeune, Elizabeth Kennedy, MD;  Location: [facility] OR;  Service: Oncology;  Laterality: N/A;    THORACENTESIS  09/21/2024    x3     Social History     Socioeconomic History    Marital status:    Tobacco Use    Smoking status: Former     Current packs/day: 0.00     Types: Cigarettes     Quit date: 2/8/2022     Years since quitting: 3.3    Smokeless tobacco: Never   Substance and Sexual Activity    Alcohol use: Not Currently     Alcohol/week: 2.0 standard drinks of alcohol     Types: 2 Cans of beer per week    Drug use: Never    Sexual activity: Not Currently     Partners: Female     Birth control/protection: None     Social Drivers of Health     Financial Resource Strain: Low Risk  (11/10/2024)    Overall Financial Resource Strain (CARDIA)     Difficulty of Paying Living Expenses: Not hard at all   Food Insecurity: No Food Insecurity (11/10/2024)    Hunger Vital Sign     Worried About Running Out of Food in the Last Year: Never true     Ran Out of Food in the Last Year: Never true   Transportation Needs: No Transportation Needs (10/1/2024)    TRANSPORTATION NEEDS     Transportation : No   Physical Activity: Inactive (11/10/2024)    Exercise Vital Sign     Days of Exercise per Week: 0 days     Minutes of Exercise per Session: 10 min   Stress: No Stress Concern Present (11/10/2024)    [employer] - Occupational Stress Questionnaire     Feeling of Stress : Only a little   Housing Stability: Unknown (11/10/2024)    Housing Stability Vital Sign     Unable to Pay for Housing in the Last Year: No     Homeless in the Last Year: No      Family History   Problem Relation Name Age of Onset    Anesthesia problems Cousin          "trouble waking up after"    "   Review of patient's allergies indicates:  No Known Allergies   Review of Systems   Constitutional:  Negative for activity change, appetite change, chills, fatigue, fever and unexpected weight change.   HENT:  Negative for mouth sores and sore throat.    Eyes:  Negative for visual disturbance.   Respiratory:  Negative for cough and shortness of breath.    Cardiovascular:  Negative for chest pain.   Gastrointestinal:  Negative for abdominal pain, constipation, diarrhea, nausea and vomiting.   Endocrine: Negative for polyuria.   Genitourinary:  Negative for dysuria and frequency.   Musculoskeletal:  Negative for back pain.   Integumentary:  Negative for rash.   Allergic/Immunologic: Negative for frequent infections.   Neurological:  Negative for weakness and headaches.   Hematological:  Negative for adenopathy. Does not bruise/bleed easily.   Psychiatric/Behavioral:  Positive for depressed mood. The patient is nervous/anxious.          Objective:      Vitals:    06/11/25 0950   BP: 122/71   Pulse: 85   Resp: (!) 22   Temp: 97.8 °F (36.6 °C)         Physical Exam  Constitutional:       General: He is not in acute distress.     Appearance: Normal appearance. He is not ill-appearing.   HENT:      Head: Normocephalic and atraumatic.      Nose: Nose normal.      Mouth/Throat:      Mouth: Mucous membranes are moist.      Pharynx: Oropharynx is clear.   Eyes:      Extraocular Movements: Extraocular movements intact.      Conjunctiva/sclera: Conjunctivae normal.      Pupils: Pupils are equal, round, and reactive to light.   Cardiovascular:      Rate and Rhythm: Normal rate and regular rhythm.      Pulses: Normal pulses.      Heart sounds: Normal heart sounds. No murmur heard.  Pulmonary:      Effort: Pulmonary effort is normal. No respiratory distress.   Abdominal:      General: There is no distension.      Palpations: Abdomen is soft.      Tenderness: There is no abdominal tenderness.   Musculoskeletal:         General:  Deformity (R BKA) present. Normal range of motion.      Cervical back: Normal range of motion and neck supple.      Right lower leg: No edema.      Left lower leg: No edema.   Lymphadenopathy:      Cervical: No cervical adenopathy.   Skin:     General: Skin is warm and dry.      Findings: Rash (Erythematous fungal appears rash to skin fold underneath panus) present.   Neurological:      General: No focal deficit present.      Mental Status: He is alert and oriented to person, place, and time.         LABS AND IMAGING REVIEWED IN EPIC    IMAGIN25 MRI Brain:  Impression:  1. Generalized cerebral and cerebellar atrophy  2. Scattered foci of abnormal signal intensity again noted to the periventricular and subcortical white matter suspicious for foci of ischemia versus gliosis  3. Findings and other details as above    25 PET/CT:  Impression:  1. Interval decreased size and FDG avidity of right paratracheal lymph node anterior to the mainstem bronchus  2. Improved dependent consolidative opacity in the right upper lobe with resolved hypermetabolism.  3. Improved right pleural effusion.  4. No new foci of disease seen.    PATHOLOGY:  10/2/24 EBUS:  FINAL DIAGNOSIS   1. LYMPH NODE 4R, EBUS-GUIDED BIOPSY:   METASTATIC SMALL CELL CARCINOMA (HIGH GRADE NEUROENDOCRINE CARCINOMA).   IMMUNOHISTOCHEMICAL STAINS PERFORMED WITH ADEQUATE CONTROLS:   KI-67, LOW MOLECULAR WEIGHT CYTOKERATIN, CD56, SYNAPTOPHYSIN, TTF-1 AND   CHROMOGRANIN:  INCONCLUSIVE DUE TO EXTENSIVE NECROSIS.      2. RIGHT LUNG, UPPER LOBE, BIOPSY:   HIGH GRADE NEUROENDOCRINE CARCINOMA (SMALL CELL CARCINOMA).   IMMUNOHISTOCHEMICAL STAINS PERFORMED WITH ADEQUATE CONTROLS:   CD56, LOW MOLECULAR WEIGHT CYTOKERATIN, SYNAPTOPHYSIN, CHROMOGRANIN AND TTF-1:   STRONGLY POSITIVE IN MALIGNANT CELLS.   KI-67:  SHOWS NEAR 100% NUCLEAR POSITIVITY IN MALIGNANT CELLS.     Assessment:   Extensive Stage R Lung SCLC - with recurrent pleural effusions and near obstructing  R mainstem mass. Treatment history as above. Good response to Carbo/Etop/Atezo, has now proceeded with Atezolizumab alone.     Immunodeficiency due to Drug Therapy - Precautions discussed with patient. Continue to monitor for any signs of symptoms of infection. No prophylaxis needed at this time. No role for growth factor.     Panus Rash - Appears fungal in nature. Instructed to stop steroid cream. Will Rx antifungal cream BID. Instructed to call if no improvement.     Plan:   - Toxicity reviewed; okay to proceed with C10 of Atezolizumab on 6/12; pending CMP  - Continue to follow Palliative as needed  - Repeat PET and MRI Brain prior to C13  - RTC 3 weeks for NP visit, labs same day prior to C11    Call Daughter with all appts/information    I spent a total of 40 minutes on the day of the visit.This includes face to face time and non-face to face time preparing to see the patient (eg, review of tests), obtaining and/or reviewing separately obtained history, documenting clinical information in the electronic or other health record, independently interpreting results and communicating results to the patient/family/caregiver, or care coordinator.     code applied: Patient requires or will require continuous, longitudinal, and active collaborative plan of care related to this patient's health condition, Extensive Stage R Lung SCLC - the management of which requires the direction of a practitioner with specialized clinical knowledge, skill, and expertise.     Elizabeth Lejeune, MD  Hematology/Oncology   [facility]    Professional Services   I, Yanelis Galdamez LPN, acted solely as a scribe for and in the presence of Dr. Elizabeth Kennedy LeJeune, who performed these services.

## 2025-06-11 ENCOUNTER — OFFICE VISIT (OUTPATIENT)
Dept: HEMATOLOGY/ONCOLOGY | Facility: CLINIC | Age: 80
End: 2025-06-11
Payer: MEDICARE

## 2025-06-11 ENCOUNTER — LAB VISIT (OUTPATIENT)
Dept: LAB | Facility: HOSPITAL | Age: 80
End: 2025-06-11
Attending: STUDENT IN AN ORGANIZED HEALTH CARE EDUCATION/TRAINING PROGRAM
Payer: MEDICARE

## 2025-06-11 VITALS
SYSTOLIC BLOOD PRESSURE: 122 MMHG | TEMPERATURE: 98 F | OXYGEN SATURATION: 93 % | HEART RATE: 85 BPM | DIASTOLIC BLOOD PRESSURE: 71 MMHG | BODY MASS INDEX: 31.79 KG/M2 | HEIGHT: 63 IN | WEIGHT: 179.38 LBS | RESPIRATION RATE: 22 BRPM

## 2025-06-11 DIAGNOSIS — C7A.1 SMALL CELL NEUROENDOCRINE CARCINOMA OF LUNG: ICD-10-CM

## 2025-06-11 DIAGNOSIS — Z79.899 IMMUNODEFICIENCY DUE TO DRUG THERAPY: Primary | ICD-10-CM

## 2025-06-11 DIAGNOSIS — D84.821 IMMUNODEFICIENCY DUE TO DRUG THERAPY: Primary | ICD-10-CM

## 2025-06-11 DIAGNOSIS — Z79.899 LONG-TERM CURRENT USE OF HIGH RISK MEDICATION OTHER THAN ANTICOAGULANT: ICD-10-CM

## 2025-06-11 LAB
ALBUMIN SERPL-MCNC: 3.5 G/DL (ref 3.4–4.8)
ALBUMIN/GLOB SERPL: 1 RATIO (ref 1.1–2)
ALP SERPL-CCNC: 91 UNIT/L (ref 40–150)
ALT SERPL-CCNC: 8 UNIT/L (ref 0–55)
ANION GAP SERPL CALC-SCNC: 7 MEQ/L
AST SERPL-CCNC: 12 UNIT/L (ref 11–45)
BASOPHILS # BLD AUTO: 0.03 X10(3)/MCL
BASOPHILS NFR BLD AUTO: 0.4 %
BILIRUB SERPL-MCNC: 0.6 MG/DL
BUN SERPL-MCNC: 20.4 MG/DL (ref 8.4–25.7)
CALCIUM SERPL-MCNC: 9.1 MG/DL (ref 8.8–10)
CHLORIDE SERPL-SCNC: 110 MMOL/L (ref 98–107)
CO2 SERPL-SCNC: 24 MMOL/L (ref 23–31)
CREAT SERPL-MCNC: 0.8 MG/DL (ref 0.72–1.25)
CREAT/UREA NIT SERPL: 26
EOSINOPHIL # BLD AUTO: 0.34 X10(3)/MCL (ref 0–0.9)
EOSINOPHIL NFR BLD AUTO: 5 %
ERYTHROCYTE [DISTWIDTH] IN BLOOD BY AUTOMATED COUNT: 15.3 % (ref 11.5–17)
GFR SERPLBLD CREATININE-BSD FMLA CKD-EPI: >60 ML/MIN/1.73/M2
GLOBULIN SER-MCNC: 3.5 GM/DL (ref 2.4–3.5)
GLUCOSE SERPL-MCNC: 87 MG/DL (ref 82–115)
HCT VFR BLD AUTO: 39.8 % (ref 42–52)
HGB BLD-MCNC: 13.3 G/DL (ref 14–18)
IMM GRANULOCYTES # BLD AUTO: 0.01 X10(3)/MCL (ref 0–0.04)
IMM GRANULOCYTES NFR BLD AUTO: 0.1 %
LYMPHOCYTES # BLD AUTO: 1.63 X10(3)/MCL (ref 0.6–4.6)
LYMPHOCYTES NFR BLD AUTO: 23.8 %
MCH RBC QN AUTO: 29.9 PG (ref 27–31)
MCHC RBC AUTO-ENTMCNC: 33.4 G/DL (ref 33–36)
MCV RBC AUTO: 89.4 FL (ref 80–94)
MONOCYTES # BLD AUTO: 0.62 X10(3)/MCL (ref 0.1–1.3)
MONOCYTES NFR BLD AUTO: 9.1 %
NEUTROPHILS # BLD AUTO: 4.22 X10(3)/MCL (ref 2.1–9.2)
NEUTROPHILS NFR BLD AUTO: 61.6 %
PLATELET # BLD AUTO: 124 X10(3)/MCL (ref 130–400)
PMV BLD AUTO: 9.5 FL (ref 7.4–10.4)
POTASSIUM SERPL-SCNC: 4.1 MMOL/L (ref 3.5–5.1)
PROT SERPL-MCNC: 7 GM/DL (ref 5.8–7.6)
RBC # BLD AUTO: 4.45 X10(6)/MCL (ref 4.7–6.1)
SODIUM SERPL-SCNC: 141 MMOL/L (ref 136–145)
TSH SERPL-ACNC: 1 UIU/ML (ref 0.35–4.94)
WBC # BLD AUTO: 6.85 X10(3)/MCL (ref 4.5–11.5)

## 2025-06-11 PROCEDURE — 99215 OFFICE O/P EST HI 40 MIN: CPT | Mod: S$PBB,,, | Performed by: STUDENT IN AN ORGANIZED HEALTH CARE EDUCATION/TRAINING PROGRAM

## 2025-06-11 PROCEDURE — 36415 COLL VENOUS BLD VENIPUNCTURE: CPT

## 2025-06-11 PROCEDURE — 84443 ASSAY THYROID STIM HORMONE: CPT

## 2025-06-11 PROCEDURE — 99999 PR PBB SHADOW E&M-EST. PATIENT-LVL V: CPT | Mod: PBBFAC,,, | Performed by: STUDENT IN AN ORGANIZED HEALTH CARE EDUCATION/TRAINING PROGRAM

## 2025-06-11 PROCEDURE — G2211 COMPLEX E/M VISIT ADD ON: HCPCS | Mod: ,,, | Performed by: STUDENT IN AN ORGANIZED HEALTH CARE EDUCATION/TRAINING PROGRAM

## 2025-06-11 PROCEDURE — 80053 COMPREHEN METABOLIC PANEL: CPT

## 2025-06-11 PROCEDURE — 99215 OFFICE O/P EST HI 40 MIN: CPT | Mod: PBBFAC | Performed by: STUDENT IN AN ORGANIZED HEALTH CARE EDUCATION/TRAINING PROGRAM

## 2025-06-11 PROCEDURE — 85025 COMPLETE CBC W/AUTO DIFF WBC: CPT

## 2025-06-11 RX ORDER — HEPARIN 100 UNIT/ML
500 SYRINGE INTRAVENOUS
Status: CANCELLED | OUTPATIENT
Start: 2025-06-11

## 2025-06-11 RX ORDER — EPINEPHRINE 0.3 MG/.3ML
0.3 INJECTION SUBCUTANEOUS ONCE AS NEEDED
Status: CANCELLED | OUTPATIENT
Start: 2025-06-11

## 2025-06-11 RX ORDER — DIPHENHYDRAMINE HYDROCHLORIDE 50 MG/ML
50 INJECTION, SOLUTION INTRAMUSCULAR; INTRAVENOUS ONCE AS NEEDED
Status: CANCELLED | OUTPATIENT
Start: 2025-06-11

## 2025-06-11 RX ORDER — PROCHLORPERAZINE EDISYLATE 5 MG/ML
5 INJECTION INTRAMUSCULAR; INTRAVENOUS ONCE AS NEEDED
Status: CANCELLED | OUTPATIENT
Start: 2025-06-11

## 2025-06-11 RX ORDER — SODIUM CHLORIDE 0.9 % (FLUSH) 0.9 %
10 SYRINGE (ML) INJECTION
Status: CANCELLED | OUTPATIENT
Start: 2025-06-11

## 2025-06-12 ENCOUNTER — INFUSION (OUTPATIENT)
Dept: INFUSION THERAPY | Facility: HOSPITAL | Age: 80
End: 2025-06-12
Attending: STUDENT IN AN ORGANIZED HEALTH CARE EDUCATION/TRAINING PROGRAM
Payer: MEDICARE

## 2025-06-12 VITALS — DIASTOLIC BLOOD PRESSURE: 70 MMHG | HEART RATE: 88 BPM | SYSTOLIC BLOOD PRESSURE: 123 MMHG

## 2025-06-12 DIAGNOSIS — C7A.1 SMALL CELL NEUROENDOCRINE CARCINOMA OF LUNG: Primary | ICD-10-CM

## 2025-06-12 PROCEDURE — 25000003 PHARM REV CODE 250: Performed by: STUDENT IN AN ORGANIZED HEALTH CARE EDUCATION/TRAINING PROGRAM

## 2025-06-12 PROCEDURE — 63600175 PHARM REV CODE 636 W HCPCS: Mod: JZ,TB | Performed by: STUDENT IN AN ORGANIZED HEALTH CARE EDUCATION/TRAINING PROGRAM

## 2025-06-12 PROCEDURE — 96413 CHEMO IV INFUSION 1 HR: CPT

## 2025-06-12 RX ORDER — PROCHLORPERAZINE EDISYLATE 5 MG/ML
5 INJECTION INTRAMUSCULAR; INTRAVENOUS ONCE AS NEEDED
Status: DISCONTINUED | OUTPATIENT
Start: 2025-06-12 | End: 2025-06-12 | Stop reason: HOSPADM

## 2025-06-12 RX ORDER — DIPHENHYDRAMINE HYDROCHLORIDE 50 MG/ML
50 INJECTION, SOLUTION INTRAMUSCULAR; INTRAVENOUS ONCE AS NEEDED
Status: DISCONTINUED | OUTPATIENT
Start: 2025-06-12 | End: 2025-06-12 | Stop reason: HOSPADM

## 2025-06-12 RX ORDER — SODIUM CHLORIDE 0.9 % (FLUSH) 0.9 %
10 SYRINGE (ML) INJECTION
Status: DISCONTINUED | OUTPATIENT
Start: 2025-06-12 | End: 2025-06-12 | Stop reason: HOSPADM

## 2025-06-12 RX ORDER — HEPARIN 100 UNIT/ML
500 SYRINGE INTRAVENOUS
Status: DISCONTINUED | OUTPATIENT
Start: 2025-06-12 | End: 2025-06-12 | Stop reason: HOSPADM

## 2025-06-12 RX ORDER — EPINEPHRINE 0.3 MG/.3ML
0.3 INJECTION SUBCUTANEOUS ONCE AS NEEDED
Status: DISCONTINUED | OUTPATIENT
Start: 2025-06-12 | End: 2025-06-12 | Stop reason: HOSPADM

## 2025-06-12 RX ADMIN — ATEZOLIZUMAB 1200 MG: 1200 INJECTION, SOLUTION INTRAVENOUS at 10:06

## 2025-06-30 DIAGNOSIS — B36.9 FUNGAL RASH OF TRUNK: ICD-10-CM

## 2025-06-30 RX ORDER — NYSTATIN 100000 U/G
CREAM TOPICAL 2 TIMES DAILY
Qty: 15 G | Refills: 1 | Status: SHIPPED | OUTPATIENT
Start: 2025-06-30

## 2025-07-02 ENCOUNTER — LAB VISIT (OUTPATIENT)
Dept: LAB | Facility: HOSPITAL | Age: 80
End: 2025-07-02
Attending: STUDENT IN AN ORGANIZED HEALTH CARE EDUCATION/TRAINING PROGRAM
Payer: MEDICARE

## 2025-07-02 ENCOUNTER — OFFICE VISIT (OUTPATIENT)
Dept: HEMATOLOGY/ONCOLOGY | Facility: CLINIC | Age: 80
End: 2025-07-02
Payer: MEDICARE

## 2025-07-02 VITALS
HEART RATE: 89 BPM | HEIGHT: 63 IN | OXYGEN SATURATION: 91 % | DIASTOLIC BLOOD PRESSURE: 64 MMHG | SYSTOLIC BLOOD PRESSURE: 103 MMHG | RESPIRATION RATE: 18 BRPM | BODY MASS INDEX: 33.08 KG/M2 | TEMPERATURE: 98 F | WEIGHT: 186.69 LBS

## 2025-07-02 DIAGNOSIS — B36.9 FUNGAL RASH OF TRUNK: ICD-10-CM

## 2025-07-02 DIAGNOSIS — C7A.1 SMALL CELL NEUROENDOCRINE CARCINOMA OF LUNG: ICD-10-CM

## 2025-07-02 DIAGNOSIS — C7A.1 SMALL CELL NEUROENDOCRINE CARCINOMA OF LUNG: Primary | ICD-10-CM

## 2025-07-02 DIAGNOSIS — K59.00 CONSTIPATION, UNSPECIFIED CONSTIPATION TYPE: ICD-10-CM

## 2025-07-02 DIAGNOSIS — D84.821 IMMUNODEFICIENCY DUE TO DRUG THERAPY: ICD-10-CM

## 2025-07-02 DIAGNOSIS — Z79.899 LONG-TERM CURRENT USE OF HIGH RISK MEDICATION OTHER THAN ANTICOAGULANT: ICD-10-CM

## 2025-07-02 DIAGNOSIS — Z79.899 IMMUNODEFICIENCY DUE TO DRUG THERAPY: ICD-10-CM

## 2025-07-02 LAB
ALBUMIN SERPL-MCNC: 3.4 G/DL (ref 3.4–4.8)
ALBUMIN/GLOB SERPL: 1 RATIO (ref 1.1–2)
ALP SERPL-CCNC: 84 UNIT/L (ref 40–150)
ALT SERPL-CCNC: 12 UNIT/L (ref 0–55)
ANION GAP SERPL CALC-SCNC: 7 MEQ/L
AST SERPL-CCNC: 13 UNIT/L (ref 11–45)
BASOPHILS # BLD AUTO: 0.06 X10(3)/MCL
BASOPHILS NFR BLD AUTO: 0.7 %
BILIRUB SERPL-MCNC: 0.4 MG/DL
BUN SERPL-MCNC: 8.2 MG/DL (ref 8.4–25.7)
CALCIUM SERPL-MCNC: 9 MG/DL (ref 8.8–10)
CHLORIDE SERPL-SCNC: 104 MMOL/L (ref 98–107)
CO2 SERPL-SCNC: 27 MMOL/L (ref 23–31)
CREAT SERPL-MCNC: 0.78 MG/DL (ref 0.72–1.25)
CREAT/UREA NIT SERPL: 11
EOSINOPHIL # BLD AUTO: 1.53 X10(3)/MCL (ref 0–0.9)
EOSINOPHIL NFR BLD AUTO: 17.3 %
ERYTHROCYTE [DISTWIDTH] IN BLOOD BY AUTOMATED COUNT: 15.2 % (ref 11.5–17)
GFR SERPLBLD CREATININE-BSD FMLA CKD-EPI: >60 ML/MIN/1.73/M2
GLOBULIN SER-MCNC: 3.5 GM/DL (ref 2.4–3.5)
GLUCOSE SERPL-MCNC: 95 MG/DL (ref 82–115)
HCT VFR BLD AUTO: 39.1 % (ref 42–52)
HGB BLD-MCNC: 13.1 G/DL (ref 14–18)
IMM GRANULOCYTES # BLD AUTO: 0.02 X10(3)/MCL (ref 0–0.04)
IMM GRANULOCYTES NFR BLD AUTO: 0.2 %
LYMPHOCYTES # BLD AUTO: 1.82 X10(3)/MCL (ref 0.6–4.6)
LYMPHOCYTES NFR BLD AUTO: 20.5 %
MCH RBC QN AUTO: 30.2 PG (ref 27–31)
MCHC RBC AUTO-ENTMCNC: 33.5 G/DL (ref 33–36)
MCV RBC AUTO: 90.1 FL (ref 80–94)
MONOCYTES # BLD AUTO: 0.78 X10(3)/MCL (ref 0.1–1.3)
MONOCYTES NFR BLD AUTO: 8.8 %
NEUTROPHILS # BLD AUTO: 4.65 X10(3)/MCL (ref 2.1–9.2)
NEUTROPHILS NFR BLD AUTO: 52.5 %
PLATELET # BLD AUTO: 144 X10(3)/MCL (ref 130–400)
PMV BLD AUTO: 9.3 FL (ref 7.4–10.4)
POTASSIUM SERPL-SCNC: 4.3 MMOL/L (ref 3.5–5.1)
PROT SERPL-MCNC: 6.9 GM/DL (ref 5.8–7.6)
RBC # BLD AUTO: 4.34 X10(6)/MCL (ref 4.7–6.1)
SODIUM SERPL-SCNC: 138 MMOL/L (ref 136–145)
TSH SERPL-ACNC: 0.94 UIU/ML (ref 0.35–4.94)
WBC # BLD AUTO: 8.86 X10(3)/MCL (ref 4.5–11.5)

## 2025-07-02 PROCEDURE — 84443 ASSAY THYROID STIM HORMONE: CPT

## 2025-07-02 PROCEDURE — 80053 COMPREHEN METABOLIC PANEL: CPT

## 2025-07-02 PROCEDURE — G2211 COMPLEX E/M VISIT ADD ON: HCPCS | Mod: ,,,

## 2025-07-02 PROCEDURE — 85025 COMPLETE CBC W/AUTO DIFF WBC: CPT

## 2025-07-02 PROCEDURE — 99214 OFFICE O/P EST MOD 30 MIN: CPT | Mod: PBBFAC

## 2025-07-02 PROCEDURE — 99999 PR PBB SHADOW E&M-EST. PATIENT-LVL IV: CPT | Mod: PBBFAC,,,

## 2025-07-02 PROCEDURE — 99215 OFFICE O/P EST HI 40 MIN: CPT | Mod: S$PBB,,,

## 2025-07-02 PROCEDURE — 36415 COLL VENOUS BLD VENIPUNCTURE: CPT

## 2025-07-02 NOTE — PROGRESS NOTES
Subjective:       Patient ID: Gerald J Lejeune is a 80 y.o. male.    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 He has a 150+ pack year smoking history, quit 1 year prior to presentation, on 3-4L NC at baseline. He presented to [facility] in September '24 for new onset seizures. Workup was done which was unrevealing of etiology, but during that workup he was found to have large volume R pleural effusion, large mediastinal LAD, and R mainstem mucous plugging vs mass obstruction. He has had his R sided pleural effusion drained twice due to quick reaccumulation, cytology negative x2. He underwent EBUS on 10/2 where within the R mainstem bronchus a near complete occlusion due to fungating mass was noted. Biopsy was done with final pathology revealed grade 3 neuroendocrine carcinoma consistent with small cell lung cancer.     His diagnosis, staging, and prognosis. The NCCN guidelines when discussing treatment therapy were referenced. The common side effects of chemotherapy and immunotherapy in detail including fatigue, nausea, vomiting, constipation, diarrhea, and increased risk of infections were explained. His median OS with and without treatment was discussed.  The role of palliative care as a supplement to treatment to support him and his family with the symptoms of his cancer was also discussed. They were agreeable to proceed.     October '24 PET with evidence of extensive disease to R lobe with pleural effusion and hypermetabolic LAD. No evidence of disease outside of the chest. He was transitioned into 1st line carboplatin AUC 4, Etoposide 80mg/m2, and Atezolizumab 1200mg Q3w x 4 cycles in the palliative setting then repeat PET to assess response.  Chemotherapy dose reduced due to patients age, comorbidities, and performance status.     Interval History:   Patient presents to clinic today for NP follow up appointment and labs for toxicity check and scan results prior to C11 of  Atezo alone on 7/2.  Celebrating his birthday today  Tolerating treatment with no major issue.  Complains of constipation for the past few days  He has attempted magnesium citrate without success.   His appetite and energy level both remain stable.   Denies any abnormal cough or dyspnea.  Discussed labs and plan in detail with patient and spouse.  Reports rash to lower abd; nothing new   Overall, he has no major complaints or concerns today.     Past Medical History:   Diagnosis Date    Adenopathy     Anesthesia complication     takes longer to wake    Anxiety     BPH (benign prostatic hyperplasia)     COPD (chronic obstructive pulmonary disease)     Dementia     Depression     Dizziness     Does use hearing aid     Hyperlipidemia     Hypertension     Insomnia     Obesity     Oxygen dependent     4L/NC prn    PAD (peripheral artery disease)     Post-obstructive pneumonia due to foreign body aspiration     Levaquin    Seizures     Shortness of breath     Sleep apnea     uses CPAP    Small cell neuroendocrine carcinoma of lung     Use of cane as ambulatory aid     Walker as ambulation aid       Past Surgical History:   Procedure Laterality Date    BELOW KNEE AMPUTATION OF LOWER EXTREMITY Right     BRONCHOSCOPY, WITH TRANSBRONCHIAL BIOPSY Right 10/02/2024    Procedure: BRONCHOSCOPY, WITH TRANSBRONCHIAL BIOPSY;  Surgeon: Lee Suarez Jr., MD, [facility];  Location: [facility] BRONCHOSCOPY LAB;  Service: Pulmonary;  Laterality: Right;  RUL    CATARACT EXTRACTION      ENDOBRONCHIAL ULTRASOUND N/A 10/02/2024    Procedure: ENDOBRONCHIAL ULTRASOUND (EBUS)  with FLURO;  Surgeon: Lee Suarez Jr., MD, [facility];  Location: [facility] BRONCHOSCOPY LAB;  Service: Pulmonary;  Laterality: N/A;    INSERTION OF TUNNELED  CENTRAL VENOUS CATHETER (CVC) WITH SUBCUTANEOUS PORT Right 2/11/2025    Procedure: [id number];  Surgeon: Artur Gomez MD;  Location: [facility];  Service: Peripheral Vascular;  Laterality: Right;    INSERTION, STENT, BARE METAL, ARTERY, PERIPHERAL  2019    PERIPHERALLY INSERTED CENTRAL CATHETER INSERTION N/A 10/14/2024    Procedure: Insertion-picc;  Surgeon: Lejeune, Elizabeth Kennedy, MD;  Location: [facility] OR;  Service: Oncology;  Laterality: N/A;    THORACENTESIS  09/21/2024    x3     Social History     Socioeconomic History    Marital status:    Tobacco Use    Smoking status: Former     Current packs/day: 0.00     Types: Cigarettes     Quit date: 2/8/2022     Years since quitting: 3.3    Smokeless tobacco: Never   Substance and Sexual Activity    Alcohol use: Not Currently     Alcohol/week: 2.0 standard drinks of alcohol     Types: 2 Cans of beer per week    Drug use: Never    Sexual activity: Not Currently     Partners: Female     Birth control/protection: None     Social Drivers of Health     Financial Resource Strain: Low Risk  (11/10/2024)    Overall Financial Resource Strain (CARDIA)     Difficulty of Paying Living Expenses: Not hard at all   Food Insecurity: No Food Insecurity (11/10/2024)    Hunger Vital Sign     Worried About Running Out of Food in the Last Year: Never true     Ran Out of Food in the Last Year: Never true   Transportation Needs: No Transportation Needs (10/1/2024)    TRANSPORTATION NEEDS     Transportation : No   Physical Activity: Inactive (11/10/2024)    Exercise Vital Sign     Days of Exercise per Week: 0 days     Minutes of Exercise per Session: 10 min   Stress: No Stress Concern Present (11/10/2024)    [employer] - Occupational Stress Questionnaire     Feeling of Stress : Only a little   Housing Stability: Unknown (11/10/2024)    Housing Stability Vital Sign     Unable to Pay for Housing in the Last Year: No     Homeless in the Last  "Year: No      Family History   Problem Relation Name Age of Onset    Anesthesia problems Cousin          "trouble waking up after"      Review of patient's allergies indicates:  No Known Allergies   Review of Systems   Constitutional:  Negative for activity change, appetite change, chills, fatigue, fever and unexpected weight change.   HENT:  Negative for mouth sores and sore throat.    Eyes:  Negative for visual disturbance.   Respiratory:  Negative for cough and shortness of breath.    Cardiovascular:  Negative for chest pain.   Gastrointestinal:  Negative for abdominal pain, constipation, diarrhea, nausea and vomiting.   Endocrine: Negative for polyuria.   Genitourinary:  Negative for dysuria and frequency.   Musculoskeletal:  Negative for back pain.   Integumentary:  Negative for rash.   Allergic/Immunologic: Negative for frequent infections.   Neurological:  Negative for weakness and headaches.   Hematological:  Negative for adenopathy. Does not bruise/bleed easily.   Psychiatric/Behavioral:  Positive for depressed mood. The patient is nervous/anxious.          Objective:      Vitals:    07/02/25 1009   BP: 103/64   Pulse: 89   Resp: 18   Temp: 97.8 °F (36.6 °C)         Physical Exam  Constitutional:       General: He is not in acute distress.     Appearance: Normal appearance. He is not ill-appearing.   HENT:      Head: Normocephalic and atraumatic.      Nose: Nose normal.      Mouth/Throat:      Mouth: Mucous membranes are moist.      Pharynx: Oropharynx is clear.   Eyes:      Extraocular Movements: Extraocular movements intact.      Conjunctiva/sclera: Conjunctivae normal.      Pupils: Pupils are equal, round, and reactive to light.   Cardiovascular:      Rate and Rhythm: Normal rate and regular rhythm.      Pulses: Normal pulses.      Heart sounds: Normal heart sounds. No murmur heard.  Pulmonary:      Effort: Pulmonary effort is normal. No respiratory distress.   Abdominal:      General: There is no " distension.      Palpations: Abdomen is soft.      Tenderness: There is no abdominal tenderness.   Musculoskeletal:         General: Deformity (R BKA) present. Normal range of motion.      Cervical back: Normal range of motion and neck supple.      Right lower leg: No edema.      Left lower leg: No edema.   Lymphadenopathy:      Cervical: No cervical adenopathy.   Skin:     General: Skin is warm and dry.      Findings: Rash (Erythematous fungal appears rash to skin fold underneath panus) present.   Neurological:      General: No focal deficit present.      Mental Status: He is alert and oriented to person, place, and time.       LABS AND IMAGING REVIEWED IN EPIC    IMAGIN25 MRI Brain:  Impression:  1. Generalized cerebral and cerebellar atrophy  2. Scattered foci of abnormal signal intensity again noted to the periventricular and subcortical white matter suspicious for foci of ischemia versus gliosis  3. Findings and other details as above    25 PET/CT:  Impression:  1. Interval decreased size and FDG avidity of right paratracheal lymph node anterior to the mainstem bronchus  2. Improved dependent consolidative opacity in the right upper lobe with resolved hypermetabolism.  3. Improved right pleural effusion.  4. No new foci of disease seen.    PATHOLOGY:  10/2/24 EBUS:  FINAL DIAGNOSIS   1. LYMPH NODE 4R, EBUS-GUIDED BIOPSY:   METASTATIC SMALL CELL CARCINOMA (HIGH GRADE NEUROENDOCRINE CARCINOMA).   IMMUNOHISTOCHEMICAL STAINS PERFORMED WITH ADEQUATE CONTROLS:   KI-67, LOW MOLECULAR WEIGHT CYTOKERATIN, CD56, SYNAPTOPHYSIN, TTF-1 AND   CHROMOGRANIN:  INCONCLUSIVE DUE TO EXTENSIVE NECROSIS.      2. RIGHT LUNG, UPPER LOBE, BIOPSY:   HIGH GRADE NEUROENDOCRINE CARCINOMA (SMALL CELL CARCINOMA).   IMMUNOHISTOCHEMICAL STAINS PERFORMED WITH ADEQUATE CONTROLS:   CD56, LOW MOLECULAR WEIGHT CYTOKERATIN, SYNAPTOPHYSIN, CHROMOGRANIN AND TTF-1:   STRONGLY POSITIVE IN MALIGNANT CELLS.   KI-67:  SHOWS NEAR 100% NUCLEAR  POSITIVITY IN MALIGNANT CELLS.     Assessment:   Extensive Stage R Lung SCLC - with recurrent pleural effusions and near obstructing R mainstem mass. Treatment history as above. Good response to Carbo/Etop/Atezo, has now proceeded with Atezolizumab alone.     Immunodeficiency due to Drug Therapy - Precautions discussed with patient. Continue to monitor for any signs of symptoms of infection. No prophylaxis needed at this time. No role for growth factor.     Panus Rash - Appears fungal in nature. Instructed to stop steroid cream. Continue antifungal cream BID. Instructed to call if no improvement.     Constipation- attempted magnesium citrate without success. Rx sent for Lactulose 20g/30ml TID PRN   Plan:   - Toxicity reviewed; okay to proceed with C11 of Atezolizumab on 7/3; pending CMP  - Continue to follow Palliative as needed  - Okay to start Lactulose PRN constipation; Rx sent today  - Repeat PET and MRI Brain prior to C13  - RTC 3 weeks for NP visit, labs same day prior to C12    Call Daughter with all appts/information    I spent a total of 40 minutes on the day of the visit.This includes face to face time and non-face to face time preparing to see the patient (eg, review of tests), obtaining and/or reviewing separately obtained history, documenting clinical information in the electronic or other health record, independently interpreting results and communicating results to the patient/family/caregiver, or care coordinator.     code applied: Patient requires or will require continuous, longitudinal, and active collaborative plan of care related to this patient's health condition, Extensive Stage R Lung SCLC - the management of which requires the direction of a practitioner with specialized clinical knowledge, skill, and expertise.     DAVID Don  Hematology/Oncology   [facility]

## 2025-07-03 ENCOUNTER — INFUSION (OUTPATIENT)
Dept: INFUSION THERAPY | Facility: HOSPITAL | Age: 80
End: 2025-07-03
Attending: STUDENT IN AN ORGANIZED HEALTH CARE EDUCATION/TRAINING PROGRAM
Payer: MEDICARE

## 2025-07-03 VITALS
HEART RATE: 102 BPM | WEIGHT: 186.5 LBS | RESPIRATION RATE: 18 BRPM | DIASTOLIC BLOOD PRESSURE: 65 MMHG | HEIGHT: 63 IN | BODY MASS INDEX: 33.04 KG/M2 | SYSTOLIC BLOOD PRESSURE: 100 MMHG | TEMPERATURE: 98 F

## 2025-07-03 DIAGNOSIS — C7A.1 SMALL CELL NEUROENDOCRINE CARCINOMA OF LUNG: Primary | ICD-10-CM

## 2025-07-03 PROCEDURE — 96413 CHEMO IV INFUSION 1 HR: CPT

## 2025-07-03 PROCEDURE — 25000003 PHARM REV CODE 250

## 2025-07-03 PROCEDURE — 63600175 PHARM REV CODE 636 W HCPCS: Mod: JZ,TB

## 2025-07-03 RX ORDER — EPINEPHRINE 0.3 MG/.3ML
0.3 INJECTION SUBCUTANEOUS ONCE AS NEEDED
Status: DISCONTINUED | OUTPATIENT
Start: 2025-07-03 | End: 2025-07-03 | Stop reason: HOSPADM

## 2025-07-03 RX ORDER — DIPHENHYDRAMINE HYDROCHLORIDE 50 MG/ML
50 INJECTION, SOLUTION INTRAMUSCULAR; INTRAVENOUS ONCE AS NEEDED
Status: DISCONTINUED | OUTPATIENT
Start: 2025-07-03 | End: 2025-07-03 | Stop reason: HOSPADM

## 2025-07-03 RX ORDER — SODIUM CHLORIDE 0.9 % (FLUSH) 0.9 %
10 SYRINGE (ML) INJECTION
Status: DISCONTINUED | OUTPATIENT
Start: 2025-07-03 | End: 2025-07-03 | Stop reason: HOSPADM

## 2025-07-03 RX ORDER — HEPARIN 100 UNIT/ML
500 SYRINGE INTRAVENOUS
Status: DISCONTINUED | OUTPATIENT
Start: 2025-07-03 | End: 2025-07-03 | Stop reason: HOSPADM

## 2025-07-03 RX ORDER — PROCHLORPERAZINE EDISYLATE 5 MG/ML
5 INJECTION INTRAMUSCULAR; INTRAVENOUS ONCE AS NEEDED
Status: DISCONTINUED | OUTPATIENT
Start: 2025-07-03 | End: 2025-07-03 | Stop reason: HOSPADM

## 2025-07-03 RX ADMIN — ATEZOLIZUMAB 1200 MG: 1200 INJECTION, SOLUTION INTRAVENOUS at 10:07

## 2025-07-17 ENCOUNTER — TELEPHONE (OUTPATIENT)
Dept: HEMATOLOGY/ONCOLOGY | Facility: CLINIC | Age: 80
End: 2025-07-17
Payer: MEDICARE

## 2025-07-17 NOTE — TELEPHONE ENCOUNTER
Patient's caretaker states that he was recently diagnosed with shingles and will complete anti-virals on 7/24/25; same day as treatment. She would like to know if he is able to still get treated if sores are dry. Please advise. Has an OV day before.

## 2025-07-29 ENCOUNTER — TELEPHONE (OUTPATIENT)
Dept: HEMATOLOGY/ONCOLOGY | Facility: CLINIC | Age: 80
End: 2025-07-29
Payer: MEDICARE

## 2025-07-29 DIAGNOSIS — C7A.1 SMALL CELL NEUROENDOCRINE CARCINOMA OF LUNG: Primary | ICD-10-CM

## 2025-07-29 NOTE — TELEPHONE ENCOUNTER
Patient's granddaughter requesting referral to [facility]. Okay to send order?

## 2025-07-31 NOTE — PROGRESS NOTES
Subjective:       Patient ID: Gerald J Lejeune is a 80 y.o. male.    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 He has a 150+ pack year smoking history, quit 1 year prior to presentation, on 3-4L NC at baseline. He presented to [facility] in September '24 for new onset seizures. Workup was done which was unrevealing of etiology, but during that workup he was found to have large volume R pleural effusion, large mediastinal LAD, and R mainstem mucous plugging vs mass obstruction. He has had his R sided pleural effusion drained twice due to quick reaccumulation, cytology negative x2. He underwent EBUS on 10/2 where within the R mainstem bronchus a near complete occlusion due to fungating mass was noted. Biopsy was done with final pathology revealed grade 3 neuroendocrine carcinoma consistent with small cell lung cancer.     His diagnosis, staging, and prognosis. The NCCN guidelines when discussing treatment therapy were referenced. The common side effects of chemotherapy and immunotherapy in detail including fatigue, nausea, vomiting, constipation, diarrhea, and increased risk of infections were explained. His median OS with and without treatment was discussed.  The role of palliative care as a supplement to treatment to support him and his family with the symptoms of his cancer was also discussed. They were agreeable to proceed.     October '24 PET with evidence of extensive disease to R lobe with pleural effusion and hypermetabolic LAD. No evidence of disease outside of the chest. He was transitioned into 1st line carboplatin AUC 4, Etoposide 80mg/m2, and Atezolizumab 1200mg Q3w x 4 cycles in the palliative setting then repeat PET to assess response.  Chemotherapy dose reduced due to patients age, comorbidities, and performance status.     Interval History:   Patient presents to clinic today for MD follow up appointment and labs for toxicity check prior to C12 of Atezo alone on 8/7.  Continues to recover following recent hospitalization for COPD and Shingles exacerbation.  Complains of constipation, no appetite and increased bloating.  He has refused to use Magnesium Citrate due to poor taste  Reports worsening dyspnea, energy level, and strength since hospitalization.  Discussed labs and plan in detail with patient and family.  Otherwise, he has no further complaints or concerns today.    Past Medical History:   Diagnosis Date    Adenopathy     Anesthesia complication     takes longer to wake    Anxiety     BPH (benign prostatic hyperplasia)     COPD (chronic obstructive pulmonary disease)     Dementia     Depression     Dizziness     Does use hearing aid     Hyperlipidemia     Hypertension     Insomnia     Obesity     Oxygen dependent     4L/NC prn    PAD (peripheral artery disease)     Post-obstructive pneumonia due to foreign body aspiration     Levaquin    Seizures     Shortness of breath     Sleep apnea     uses CPAP    Small cell neuroendocrine carcinoma of lung     Use of cane as ambulatory aid     Walker as ambulation aid       Past Surgical History:   Procedure Laterality Date    BELOW KNEE AMPUTATION OF LOWER EXTREMITY Right     BRONCHOSCOPY, WITH TRANSBRONCHIAL BIOPSY Right 10/02/2024    Procedure: BRONCHOSCOPY, WITH TRANSBRONCHIAL BIOPSY;  Surgeon: Lee Suarez Jr., MD, [facility];  Location: [facility] BRONCHOSCOPY LAB;  Service: Pulmonary;  Laterality: Right;  RUL    CATARACT EXTRACTION      ENDOBRONCHIAL ULTRASOUND N/A 10/02/2024    Procedure: ENDOBRONCHIAL ULTRASOUND (EBUS)  with FLURO;  Surgeon: Lee Suarez Jr., MD, [facility];  Location: [facility] BRONCHOSCOPY LAB;  Service: Pulmonary;  Laterality: N/A;    INSERTION OF TUNNELED CENTRAL VENOUS CATHETER (CVC)  WITH SUBCUTANEOUS PORT Right 2/11/2025    Procedure: [id number];  Surgeon: Artur Gomez MD;  Location: [facility];  Service: Peripheral Vascular;  Laterality: Right;    INSERTION, STENT, BARE METAL, ARTERY, PERIPHERAL  2019    PERIPHERALLY INSERTED CENTRAL CATHETER INSERTION N/A 10/14/2024    Procedure: Insertion-picc;  Surgeon: Lejeune, Elizabeth Kennedy, MD;  Location: [facility];  Service: Oncology;  Laterality: N/A;    THORACENTESIS  09/21/2024    x3     Social History     Socioeconomic History    Marital status:    Tobacco Use    Smoking status: Former     Current packs/day: 0.00     Types: Cigarettes     Quit date: 2/8/2022     Years since quitting: 3.4    Smokeless tobacco: Never   Substance and Sexual Activity    Alcohol use: Not Currently     Alcohol/week: 2.0 standard drinks of alcohol     Types: 2 Cans of beer per week    Drug use: Never    Sexual activity: Not Currently     Partners: Female     Birth control/protection: None     Social Drivers of Health     Financial Resource Strain: Low Risk  (11/10/2024)    Overall Financial Resource Strain (CARDIA)     Difficulty of Paying Living Expenses: Not hard at all   Food Insecurity: No Food Insecurity (11/10/2024)    Hunger Vital Sign     Worried About Running Out of Food in the Last Year: Never true     Ran Out of Food in the Last Year: Never true   Transportation Needs: No Transportation Needs (10/1/2024)    TRANSPORTATION NEEDS     Transportation : No   Physical Activity: Inactive (11/10/2024)    Exercise Vital Sign     Days of Exercise per Week: 0 days     Minutes of Exercise per Session: 10 min   Stress: No Stress Concern Present (11/10/2024)    [employer] - Occupational Stress Questionnaire     Feeling of Stress : Only a little   Housing Stability: Unknown (11/10/2024)    Housing Stability Vital Sign     Unable to Pay for Housing in the Last Year: No     Homeless in the Last Year: No      Family History  "  Problem Relation Name Age of Onset    Anesthesia problems Cousin          "trouble waking up after"      Review of patient's allergies indicates:  No Known Allergies   Review of Systems   Constitutional:  Negative for activity change, appetite change, chills, fatigue, fever and unexpected weight change.   HENT:  Negative for mouth sores and sore throat.    Eyes:  Negative for visual disturbance.   Respiratory:  Negative for cough and shortness of breath.    Cardiovascular:  Negative for chest pain.   Gastrointestinal:  Negative for abdominal pain, constipation, diarrhea, nausea and vomiting.   Endocrine: Negative for polyuria.   Genitourinary:  Negative for dysuria and frequency.   Musculoskeletal:  Negative for back pain.   Integumentary:  Negative for rash.   Allergic/Immunologic: Negative for frequent infections.   Neurological:  Negative for weakness and headaches.   Hematological:  Negative for adenopathy. Does not bruise/bleed easily.   Psychiatric/Behavioral:  Positive for depressed mood. The patient is nervous/anxious.          Objective:      Vitals:    08/05/25 1015   BP: 99/63   Pulse: 86   Resp: 18   Temp: 97.5 °F (36.4 °C)       Physical Exam  Constitutional:       General: He is not in acute distress.     Appearance: Normal appearance. He is not ill-appearing.   HENT:      Head: Normocephalic and atraumatic.      Nose: Nose normal.      Mouth/Throat:      Mouth: Mucous membranes are moist.      Pharynx: Oropharynx is clear.   Eyes:      Extraocular Movements: Extraocular movements intact.      Conjunctiva/sclera: Conjunctivae normal.      Pupils: Pupils are equal, round, and reactive to light.   Cardiovascular:      Rate and Rhythm: Normal rate and regular rhythm.      Pulses: Normal pulses.      Heart sounds: Normal heart sounds. No murmur heard.  Pulmonary:      Effort: Pulmonary effort is normal. No respiratory distress.   Abdominal:      General: There is no distension.      Palpations: Abdomen " is soft.      Tenderness: There is no abdominal tenderness.   Musculoskeletal:         General: Deformity (R BKA) present. Normal range of motion.      Cervical back: Normal range of motion and neck supple.      Right lower leg: No edema.      Left lower leg: No edema.   Lymphadenopathy:      Cervical: No cervical adenopathy.   Skin:     General: Skin is warm and dry.      Findings: Rash (Erythematous fungal appears rash to skin fold underneath panus) present.   Neurological:      General: No focal deficit present.      Mental Status: He is alert and oriented to person, place, and time.         LABS AND IMAGING REVIEWED IN EPIC    IMAGIN25 MRI Brain:  Impression:  1. Generalized cerebral and cerebellar atrophy  2. Scattered foci of abnormal signal intensity again noted to the periventricular and subcortical white matter suspicious for foci of ischemia versus gliosis  3. Findings and other details as above    25 PET/CT:  Impression:  1. Interval decreased size and FDG avidity of right paratracheal lymph node anterior to the mainstem bronchus  2. Improved dependent consolidative opacity in the right upper lobe with resolved hypermetabolism.  3. Improved right pleural effusion.  4. No new foci of disease seen.    PATHOLOGY:  10/2/24 EBUS:  FINAL DIAGNOSIS   1. LYMPH NODE 4R, EBUS-GUIDED BIOPSY:   METASTATIC SMALL CELL CARCINOMA (HIGH GRADE NEUROENDOCRINE CARCINOMA).   IMMUNOHISTOCHEMICAL STAINS PERFORMED WITH ADEQUATE CONTROLS:   KI-67, LOW MOLECULAR WEIGHT CYTOKERATIN, CD56, SYNAPTOPHYSIN, TTF-1 AND   CHROMOGRANIN:  INCONCLUSIVE DUE TO EXTENSIVE NECROSIS.      2. RIGHT LUNG, UPPER LOBE, BIOPSY:   HIGH GRADE NEUROENDOCRINE CARCINOMA (SMALL CELL CARCINOMA).   IMMUNOHISTOCHEMICAL STAINS PERFORMED WITH ADEQUATE CONTROLS:   CD56, LOW MOLECULAR WEIGHT CYTOKERATIN, SYNAPTOPHYSIN, CHROMOGRANIN AND TTF-1:   STRONGLY POSITIVE IN MALIGNANT CELLS.   KI-67:  SHOWS NEAR 100% NUCLEAR POSITIVITY IN MALIGNANT CELLS.      Assessment:   Extensive Stage R Lung SCLC - with recurrent pleural effusions and near obstructing R mainstem mass. Treatment history as above. Good response to Carbo/Etop/Atezo, has now proceeded with Atezolizumab alone.     Immunodeficiency due to Drug Therapy - Precautions discussed with patient. Continue to monitor for any signs of symptoms of infection. No prophylaxis needed at this time. No role for growth factor.     Constipation- Discussed importance of magnesium citrate. If no improvement will consider lactulose  Plan:   - Toxicity reviewed; will hold C12 of Atezolizumab on 8/7 due to still recovering from recent COPD exacerbation  - Continue to follow Palliative as needed  - Recommend Mag Citrate for constipation, call if no BM within 24hrs after taking  - Repeat PET and MRI Brain prior to C13; ordered today  - Referral to Home Physical Therapy  - RTC 2 weeks for MD visit, labs same day prior to C12 to discuss scan results    Call Daughter with all appts/information    I spent a total of 40 minutes on the day of the visit.This includes face to face time and non-face to face time preparing to see the patient (eg, review of tests), obtaining and/or reviewing separately obtained history, documenting clinical information in the electronic or other health record, independently interpreting results and communicating results to the patient/family/caregiver, or care coordinator.     code applied: Patient requires or will require continuous, longitudinal, and active collaborative plan of care related to this patient's health condition, Extensive Stage R Lung SCLC - the management of which requires the direction of a practitioner with specialized clinical knowledge, skill, and expertise.     Elizabeth Lejeune, MD  Hematology/Oncology   [facility]    Professional Services   I, Yanelis Galdamez LPN, acted solely as a scribe for and in the presence of Dr. Elizabeth Kennedy LeJeune, who performed  these services.

## 2025-08-05 ENCOUNTER — LAB VISIT (OUTPATIENT)
Dept: LAB | Facility: HOSPITAL | Age: 80
End: 2025-08-05
Attending: STUDENT IN AN ORGANIZED HEALTH CARE EDUCATION/TRAINING PROGRAM
Payer: MEDICARE

## 2025-08-05 ENCOUNTER — OFFICE VISIT (OUTPATIENT)
Dept: HEMATOLOGY/ONCOLOGY | Facility: CLINIC | Age: 80
End: 2025-08-05
Payer: MEDICARE

## 2025-08-05 VITALS
DIASTOLIC BLOOD PRESSURE: 63 MMHG | WEIGHT: 184.19 LBS | SYSTOLIC BLOOD PRESSURE: 99 MMHG | HEART RATE: 86 BPM | RESPIRATION RATE: 18 BRPM | OXYGEN SATURATION: 92 % | BODY MASS INDEX: 32.64 KG/M2 | HEIGHT: 63 IN | TEMPERATURE: 98 F

## 2025-08-05 DIAGNOSIS — C7A.1 SMALL CELL NEUROENDOCRINE CARCINOMA OF LUNG: ICD-10-CM

## 2025-08-05 DIAGNOSIS — D84.821 IMMUNODEFICIENCY DUE TO DRUG THERAPY: Primary | ICD-10-CM

## 2025-08-05 DIAGNOSIS — Z79.899 IMMUNODEFICIENCY DUE TO DRUG THERAPY: Primary | ICD-10-CM

## 2025-08-05 DIAGNOSIS — K59.00 CONSTIPATION, UNSPECIFIED CONSTIPATION TYPE: ICD-10-CM

## 2025-08-05 DIAGNOSIS — Z79.899 LONG-TERM CURRENT USE OF HIGH RISK MEDICATION OTHER THAN ANTICOAGULANT: ICD-10-CM

## 2025-08-05 LAB
ALBUMIN SERPL-MCNC: 2.9 G/DL (ref 3.4–4.8)
ALBUMIN/GLOB SERPL: 0.9 RATIO (ref 1.1–2)
ALP SERPL-CCNC: 75 UNIT/L (ref 40–150)
ALT SERPL-CCNC: 15 UNIT/L (ref 0–55)
ANION GAP SERPL CALC-SCNC: 7 MEQ/L
AST SERPL-CCNC: 21 UNIT/L (ref 11–45)
BASOPHILS # BLD AUTO: 0.08 X10(3)/MCL
BASOPHILS NFR BLD AUTO: 0.7 %
BILIRUB SERPL-MCNC: 0.6 MG/DL
BUN SERPL-MCNC: 12.4 MG/DL (ref 8.4–25.7)
CALCIUM SERPL-MCNC: 8.5 MG/DL (ref 8.8–10)
CHLORIDE SERPL-SCNC: 101 MMOL/L (ref 98–107)
CO2 SERPL-SCNC: 28 MMOL/L (ref 23–31)
CREAT SERPL-MCNC: 0.74 MG/DL (ref 0.72–1.25)
CREAT/UREA NIT SERPL: 17
EOSINOPHIL # BLD AUTO: 3.36 X10(3)/MCL (ref 0–0.9)
EOSINOPHIL NFR BLD AUTO: 28.7 %
ERYTHROCYTE [DISTWIDTH] IN BLOOD BY AUTOMATED COUNT: 14.7 % (ref 11.5–17)
GFR SERPLBLD CREATININE-BSD FMLA CKD-EPI: >60 ML/MIN/1.73/M2
GLOBULIN SER-MCNC: 3.4 GM/DL (ref 2.4–3.5)
GLUCOSE SERPL-MCNC: 91 MG/DL (ref 82–115)
HCT VFR BLD AUTO: 39.4 % (ref 42–52)
HGB BLD-MCNC: 13.2 G/DL (ref 14–18)
IMM GRANULOCYTES # BLD AUTO: 0.04 X10(3)/MCL (ref 0–0.04)
IMM GRANULOCYTES NFR BLD AUTO: 0.3 %
LYMPHOCYTES # BLD AUTO: 1.47 X10(3)/MCL (ref 0.6–4.6)
LYMPHOCYTES NFR BLD AUTO: 12.6 %
MCH RBC QN AUTO: 31 PG (ref 27–31)
MCHC RBC AUTO-ENTMCNC: 33.5 G/DL (ref 33–36)
MCV RBC AUTO: 92.5 FL (ref 80–94)
MONOCYTES # BLD AUTO: 0.95 X10(3)/MCL (ref 0.1–1.3)
MONOCYTES NFR BLD AUTO: 8.1 %
NEUTROPHILS # BLD AUTO: 5.8 X10(3)/MCL (ref 2.1–9.2)
NEUTROPHILS NFR BLD AUTO: 49.6 %
PLATELET # BLD AUTO: 168 X10(3)/MCL (ref 130–400)
PMV BLD AUTO: 8.8 FL (ref 7.4–10.4)
POTASSIUM SERPL-SCNC: 4.1 MMOL/L (ref 3.5–5.1)
PROT SERPL-MCNC: 6.3 GM/DL (ref 5.8–7.6)
RBC # BLD AUTO: 4.26 X10(6)/MCL (ref 4.7–6.1)
SODIUM SERPL-SCNC: 136 MMOL/L (ref 136–145)
TSH SERPL-ACNC: 2.5 UIU/ML (ref 0.35–4.94)
WBC # BLD AUTO: 11.7 X10(3)/MCL (ref 4.5–11.5)

## 2025-08-05 PROCEDURE — 99215 OFFICE O/P EST HI 40 MIN: CPT | Mod: S$PBB,,, | Performed by: STUDENT IN AN ORGANIZED HEALTH CARE EDUCATION/TRAINING PROGRAM

## 2025-08-05 PROCEDURE — 80053 COMPREHEN METABOLIC PANEL: CPT

## 2025-08-05 PROCEDURE — 99999 PR PBB SHADOW E&M-EST. PATIENT-LVL V: CPT | Mod: PBBFAC,,, | Performed by: STUDENT IN AN ORGANIZED HEALTH CARE EDUCATION/TRAINING PROGRAM

## 2025-08-05 PROCEDURE — 36415 COLL VENOUS BLD VENIPUNCTURE: CPT

## 2025-08-05 PROCEDURE — 99215 OFFICE O/P EST HI 40 MIN: CPT | Mod: PBBFAC | Performed by: STUDENT IN AN ORGANIZED HEALTH CARE EDUCATION/TRAINING PROGRAM

## 2025-08-05 PROCEDURE — 85025 COMPLETE CBC W/AUTO DIFF WBC: CPT

## 2025-08-05 PROCEDURE — 84443 ASSAY THYROID STIM HORMONE: CPT

## 2025-08-05 PROCEDURE — G2211 COMPLEX E/M VISIT ADD ON: HCPCS | Mod: ,,, | Performed by: STUDENT IN AN ORGANIZED HEALTH CARE EDUCATION/TRAINING PROGRAM

## 2025-08-05 RX ORDER — LEVOFLOXACIN 750 MG/1
750 TABLET, FILM COATED ORAL
COMMUNITY
Start: 2025-07-24

## 2025-08-05 RX ORDER — LINACLOTIDE 72 UG/1
72 CAPSULE, GELATIN COATED ORAL EVERY MORNING
COMMUNITY
Start: 2025-07-15

## 2025-08-05 RX ORDER — VALACYCLOVIR HYDROCHLORIDE 1 G/1
1000 TABLET, FILM COATED ORAL 3 TIMES DAILY
COMMUNITY
Start: 2025-07-15

## 2025-08-11 ENCOUNTER — TELEPHONE (OUTPATIENT)
Dept: HEMATOLOGY/ONCOLOGY | Facility: CLINIC | Age: 80
End: 2025-08-11
Payer: MEDICARE

## (undated) DEVICE — COVER C-ARM STRAP BAND 44X80IN

## (undated) DEVICE — GLOVE PROTEXIS LTX MICRO 6.5

## (undated) DEVICE — SUT VICRYL PLUS 3-0 SH 18IN

## (undated) DEVICE — NDL BIOPSY ASPIRATION 21G

## (undated) DEVICE — COVER PROBE US 5.5X58L NON LTX

## (undated) DEVICE — CATH BRONCHOSCOPE F/BF

## (undated) DEVICE — BOWL STERILE LARGE 32OZ

## (undated) DEVICE — SOL NORMAL USPCA 0.9%

## (undated) DEVICE — SUT MCRYL PLUS 4-0 PS2 27IN

## (undated) DEVICE — SYR 10CC LUER LOCK

## (undated) DEVICE — ADHESIVE DERMABOND ADVANCED

## (undated) DEVICE — ELECTRODE BLADE INSULATED 1 IN

## (undated) DEVICE — BAG MEDI-PLAST DECANTER C-FLOW

## (undated) DEVICE — FORCEP BIOPSY FEN SWING JAW

## (undated) DEVICE — NDL HYPO REG 25G X 1 1/2

## (undated) DEVICE — BLADE SURG STAINLESS STEEL #11

## (undated) DEVICE — KIT SURGICAL TURNOVER

## (undated) DEVICE — ELECTRODE PATIENT RETURN DISP

## (undated) DEVICE — Device

## (undated) DEVICE — GLOVE PROTEXIS PI SYN SURG 7.5